# Patient Record
Sex: FEMALE | Race: WHITE | Employment: PART TIME | URBAN - METROPOLITAN AREA
[De-identification: names, ages, dates, MRNs, and addresses within clinical notes are randomized per-mention and may not be internally consistent; named-entity substitution may affect disease eponyms.]

---

## 2021-10-25 NOTE — PROGRESS NOTES
Spoke with Marybel Del Toro from Dr. Bob Valenzuela office and patient needs pre-admission testing done day of surgery

## 2021-10-26 RX ORDER — BUDESONIDE AND FORMOTEROL FUMARATE DIHYDRATE 160; 4.5 UG/1; UG/1
AEROSOL RESPIRATORY (INHALATION)
COMMUNITY
Start: 2020-12-22

## 2021-10-26 RX ORDER — ALBUTEROL SULFATE 90 UG/1
AEROSOL, METERED RESPIRATORY (INHALATION)
COMMUNITY
Start: 2021-02-01

## 2021-10-26 RX ORDER — TRAZODONE HYDROCHLORIDE 50 MG/1
50 TABLET ORAL NIGHTLY
COMMUNITY

## 2021-10-26 RX ORDER — LORATADINE 10 MG/1
10 TABLET ORAL DAILY
COMMUNITY

## 2021-10-26 NOTE — PROGRESS NOTES
C-Difficile admission screening and protocol:     * Admitted with diarrhea? n     *Prior history of C-Diff. In last 3 months?n     *Antibiotic use in the past 6-8 weeks?n     *Prior hospitalization or nursing home in the last month? n     [facility]. .call before you fall  [address] time____7       Surgery time___830__    Take the following medications with a sip of water:    Do not eat or drink anything after 12:00 midnight prior to your surgery. This includes water chewing gum, mints and ice chips. You may brush your teeth and gargle the morning of your surgery, but do not swallow the water     Please see your family doctor/pediatrician for a history and physical and/or concerning medications. Bring any test results/reports from your physicians office. If you are under the care of a heart doctor or specialist doctor, please be aware that you may be asked to them for clearance    You may be asked to stop blood thinners such as Coumadin, Plavix, Fragmin, Lovenox, etc., or any anti-inflammatories such as:  Aspirin, Ibuprofen, Advil, Naproxen prior to your surgery. We also ask that you stop any OTC medications such as fish oil, vitamin E, glucosamine, garlic, Multivitamins, COQ 10, etc.    We ask that you do not smoke 24 hours prior to surgery  We ask that you do not  drink any alcoholic beverages 24 hours prior to surgery     You must make arrangements for a responsible adult to take you home after your surgery. For your safety you will not be allowed to leave alone or drive yourself home. Your surgery will be cancelled if you do not have a ride home. Also for your safety, it is strongly suggested that someone stay with you the first 24 hours after your surgery. A parent or legal guardian must accompany a child scheduled for surgery and plan to stay at the hospital until the child is discharged.     Please do not bring other children with [phone]  Please note these are generalized instructions for all surgical cases, you may be provided with more specific instructions according to your surgery. Preoperative Screening for Elective Surgery/Invasive Procedures While COVID-19 present in the community     Have you tested positive or have been told to self-isolate for COVID-19 like symptoms within the past 28 days? n   Do you currently have any of the following symptoms? n  o Fever >100.0 F or 99.9 F in immunocompromised patients?n  o New onset cough, shortness of breath or difficulty breathing?n  o New onset sore throat, myalgia (muscle aches and pains), headache, loss of taste/smell or diarrhea?n   Have you had a potential exposure to COVID-19 within the past 14 days by:  o Close contact with a confirmed case?n  o Close contact with a healthcare worker,  or essential infrastructure worker (grocery store, [employer], gas station, public utilities or transportation)? y  o Do you reside in a congregate setting such as; skilled nursing facility, adult home, correctional facility, homeless shelter or other institutional setting?n  o Have you had recent travel to a known COVID-19 hotspot?n    Indicate if the patient has a positive screen by answering yes to one or more of the above questions. Patients who test positive or screen positive prior to surgery or on the day of surgery should be evaluated in conjunction with the surgeon/proceduralist/anesthesiologist to determine the urgency of the procedure. Pt was requested to have Rapid Covid test to be done prior to surgery/procedure.- PT WILL HAVE DOS UNDERSTANDS ALL THE BELOW NOTED  Understands that surgery/procedure maybe subjected to cancel if not completed prior to DOS and to bring copy of rapid testing in DOS. If positive, pt must contact surgeon immediately or with any other questions.

## 2021-10-27 ENCOUNTER — ANESTHESIA EVENT (OUTPATIENT)
Dept: OPERATING ROOM | Age: 31
End: 2021-10-27
Payer: MEDICAID

## 2021-10-28 ENCOUNTER — ANESTHESIA (OUTPATIENT)
Dept: OPERATING ROOM | Age: 31
End: 2021-10-28
Payer: MEDICAID

## 2021-10-28 ENCOUNTER — HOSPITAL ENCOUNTER (OUTPATIENT)
Age: 31
Setting detail: OUTPATIENT SURGERY
Discharge: HOME | End: 2021-10-28
Attending: OBSTETRICS & GYNECOLOGY | Admitting: OBSTETRICS & GYNECOLOGY
Payer: MEDICAID

## 2021-10-28 VITALS
RESPIRATION RATE: 10 BRPM | TEMPERATURE: 98.6 F | OXYGEN SATURATION: 99 % | DIASTOLIC BLOOD PRESSURE: 49 MMHG | SYSTOLIC BLOOD PRESSURE: 89 MMHG

## 2021-10-28 VITALS
HEART RATE: 66 BPM | WEIGHT: 227.74 LBS | OXYGEN SATURATION: 97 % | HEIGHT: 68 IN | RESPIRATION RATE: 15 BRPM | BODY MASS INDEX: 34.52 KG/M2 | SYSTOLIC BLOOD PRESSURE: 105 MMHG | TEMPERATURE: 97 F | DIASTOLIC BLOOD PRESSURE: 66 MMHG

## 2021-10-28 DIAGNOSIS — G89.18 POST-OP PAIN: Primary | ICD-10-CM

## 2021-10-28 PROBLEM — C53.1 MALIGNANT NEOPLASM OF EXOCERVIX (HCC): Status: ACTIVE | Noted: 2021-10-28

## 2021-10-28 LAB
ABO/RH: NORMAL
ANION GAP SERPL CALCULATED.3IONS-SCNC: 12 MMOL/L (ref 3–16)
ANTIBODY SCREEN: NORMAL
BACTERIA: ABNORMAL /HPF
BASOPHILS ABSOLUTE: 0 K/UL (ref 0–0.2)
BASOPHILS RELATIVE PERCENT: 0.6 %
BILIRUBIN URINE: NEGATIVE
BLOOD, URINE: NEGATIVE
BUN BLDV-MCNC: 9 MG/DL (ref 7–20)
CALCIUM SERPL-MCNC: 9.6 MG/DL (ref 8.3–10.6)
CHLORIDE BLD-SCNC: 102 MMOL/L (ref 99–110)
CLARITY: ABNORMAL
CO2: 24 MMOL/L (ref 21–32)
COLOR: ABNORMAL
COMMENT UA: ABNORMAL
CREAT SERPL-MCNC: 0.7 MG/DL (ref 0.6–1.1)
EOSINOPHILS ABSOLUTE: 0.3 K/UL (ref 0–0.6)
EOSINOPHILS RELATIVE PERCENT: 5.3 %
EPITHELIAL CELLS, UA: 14 /HPF (ref 0–5)
GFR AFRICAN AMERICAN: >60
GFR NON-AFRICAN AMERICAN: >60
GLUCOSE BLD-MCNC: 94 MG/DL (ref 70–99)
GLUCOSE URINE: NEGATIVE MG/DL
HCG(URINE) PREGNANCY TEST: NEGATIVE
HCT VFR BLD CALC: 40.8 % (ref 36–48)
HEMOGLOBIN: 14 G/DL (ref 12–16)
HYALINE CASTS: 6 /LPF (ref 0–8)
KETONES, URINE: ABNORMAL MG/DL
LEUKOCYTE ESTERASE, URINE: ABNORMAL
LYMPHOCYTES ABSOLUTE: 2.4 K/UL (ref 1–5.1)
LYMPHOCYTES RELATIVE PERCENT: 40.3 %
MCH RBC QN AUTO: 31.6 PG (ref 26–34)
MCHC RBC AUTO-ENTMCNC: 34.3 G/DL (ref 31–36)
MCV RBC AUTO: 92.1 FL (ref 80–100)
MICROSCOPIC EXAMINATION: YES
MONOCYTES ABSOLUTE: 0.4 K/UL (ref 0–1.3)
MONOCYTES RELATIVE PERCENT: 5.9 %
NEUTROPHILS ABSOLUTE: 2.8 K/UL (ref 1.7–7.7)
NEUTROPHILS RELATIVE PERCENT: 47.9 %
NITRITE, URINE: NEGATIVE
PDW BLD-RTO: 13.3 % (ref 12.4–15.4)
PH UA: 6 (ref 5–8)
PLATELET # BLD: 187 K/UL (ref 135–450)
PMV BLD AUTO: 10 FL (ref 5–10.5)
POTASSIUM SERPL-SCNC: 3.6 MMOL/L (ref 3.5–5.1)
PREGNANCY, URINE: NEGATIVE
PROTEIN UA: 30 MG/DL
RBC # BLD: 4.44 M/UL (ref 4–5.2)
RBC UA: 3 /HPF (ref 0–4)
SODIUM BLD-SCNC: 138 MMOL/L (ref 136–145)
SPECIFIC GRAVITY UA: 1.02 (ref 1–1.03)
URINE REFLEX TO CULTURE: YES
URINE TYPE: ABNORMAL
UROBILINOGEN, URINE: 0.2 E.U./DL
WBC # BLD: 5.9 K/UL (ref 4–11)
WBC UA: 259 /HPF (ref 0–5)

## 2021-10-28 PROCEDURE — 6370000000 HC RX 637 (ALT 250 FOR IP): Performed by: ANESTHESIOLOGY

## 2021-10-28 PROCEDURE — 7100000000 HC PACU RECOVERY - FIRST 15 MIN: Performed by: OBSTETRICS & GYNECOLOGY

## 2021-10-28 PROCEDURE — 3600000014 HC SURGERY LEVEL 4 ADDTL 15MIN: Performed by: OBSTETRICS & GYNECOLOGY

## 2021-10-28 PROCEDURE — 6360000002 HC RX W HCPCS: Performed by: OBSTETRICS & GYNECOLOGY

## 2021-10-28 PROCEDURE — 87086 URINE CULTURE/COLONY COUNT: CPT

## 2021-10-28 PROCEDURE — 86901 BLOOD TYPING SEROLOGIC RH(D): CPT

## 2021-10-28 PROCEDURE — 6360000002 HC RX W HCPCS: Performed by: NURSE ANESTHETIST, CERTIFIED REGISTERED

## 2021-10-28 PROCEDURE — 6360000002 HC RX W HCPCS: Performed by: ANESTHESIOLOGY

## 2021-10-28 PROCEDURE — 3600000004 HC SURGERY LEVEL 4 BASE: Performed by: OBSTETRICS & GYNECOLOGY

## 2021-10-28 PROCEDURE — 84703 CHORIONIC GONADOTROPIN ASSAY: CPT

## 2021-10-28 PROCEDURE — 88305 TISSUE EXAM BY PATHOLOGIST: CPT

## 2021-10-28 PROCEDURE — 87077 CULTURE AEROBIC IDENTIFY: CPT

## 2021-10-28 PROCEDURE — 6370000000 HC RX 637 (ALT 250 FOR IP): Performed by: OBSTETRICS & GYNECOLOGY

## 2021-10-28 PROCEDURE — 2580000003 HC RX 258: Performed by: OBSTETRICS & GYNECOLOGY

## 2021-10-28 PROCEDURE — 7100000011 HC PHASE II RECOVERY - ADDTL 15 MIN: Performed by: OBSTETRICS & GYNECOLOGY

## 2021-10-28 PROCEDURE — 7100000001 HC PACU RECOVERY - ADDTL 15 MIN: Performed by: OBSTETRICS & GYNECOLOGY

## 2021-10-28 PROCEDURE — 3700000001 HC ADD 15 MINUTES (ANESTHESIA): Performed by: OBSTETRICS & GYNECOLOGY

## 2021-10-28 PROCEDURE — 86850 RBC ANTIBODY SCREEN: CPT

## 2021-10-28 PROCEDURE — 3700000000 HC ANESTHESIA ATTENDED CARE: Performed by: OBSTETRICS & GYNECOLOGY

## 2021-10-28 PROCEDURE — 85025 COMPLETE CBC W/AUTO DIFF WBC: CPT

## 2021-10-28 PROCEDURE — 2709999900 HC NON-CHARGEABLE SUPPLY: Performed by: OBSTETRICS & GYNECOLOGY

## 2021-10-28 PROCEDURE — 2500000003 HC RX 250 WO HCPCS: Performed by: OBSTETRICS & GYNECOLOGY

## 2021-10-28 PROCEDURE — 86900 BLOOD TYPING SEROLOGIC ABO: CPT

## 2021-10-28 PROCEDURE — 81001 URINALYSIS AUTO W/SCOPE: CPT

## 2021-10-28 PROCEDURE — 2500000003 HC RX 250 WO HCPCS: Performed by: NURSE ANESTHETIST, CERTIFIED REGISTERED

## 2021-10-28 PROCEDURE — 7100000010 HC PHASE II RECOVERY - FIRST 15 MIN: Performed by: OBSTETRICS & GYNECOLOGY

## 2021-10-28 PROCEDURE — 88331 PATH CONSLTJ SURG 1 BLK 1SPC: CPT

## 2021-10-28 PROCEDURE — 2580000003 HC RX 258: Performed by: ANESTHESIOLOGY

## 2021-10-28 PROCEDURE — 80048 BASIC METABOLIC PNL TOTAL CA: CPT

## 2021-10-28 RX ORDER — FERRIC SUBSULFATE 0.21 G/G
LIQUID TOPICAL
Status: COMPLETED | OUTPATIENT
Start: 2021-10-28 | End: 2021-10-28

## 2021-10-28 RX ORDER — DOXYCYCLINE HYCLATE 100 MG
100 TABLET ORAL 2 TIMES DAILY
Qty: 14 TABLET | Refills: 0 | Status: SHIPPED | OUTPATIENT
Start: 2021-10-28 | End: 2021-11-04

## 2021-10-28 RX ORDER — ONDANSETRON 2 MG/ML
4 INJECTION INTRAMUSCULAR; INTRAVENOUS
Status: DISCONTINUED | OUTPATIENT
Start: 2021-10-28 | End: 2021-10-28 | Stop reason: HOSPADM

## 2021-10-28 RX ORDER — ACETAMINOPHEN 500 MG
500 TABLET ORAL EVERY 6 HOURS PRN
Qty: 30 TABLET | Refills: 0 | Status: ON HOLD | OUTPATIENT
Start: 2021-10-28 | End: 2022-03-18 | Stop reason: SDUPTHER

## 2021-10-28 RX ORDER — OXYCODONE HYDROCHLORIDE 10 MG/1
10 TABLET ORAL PRN
Status: COMPLETED | OUTPATIENT
Start: 2021-10-28 | End: 2021-10-28

## 2021-10-28 RX ORDER — SODIUM CHLORIDE 0.9 % (FLUSH) 0.9 %
10 SYRINGE (ML) INJECTION EVERY 12 HOURS SCHEDULED
Status: DISCONTINUED | OUTPATIENT
Start: 2021-10-28 | End: 2021-10-28 | Stop reason: HOSPADM

## 2021-10-28 RX ORDER — SODIUM CHLORIDE 9 MG/ML
25 INJECTION, SOLUTION INTRAVENOUS PRN
Status: DISCONTINUED | OUTPATIENT
Start: 2021-10-28 | End: 2021-10-28 | Stop reason: HOSPADM

## 2021-10-28 RX ORDER — GLYCOPYRROLATE 0.2 MG/ML
INJECTION INTRAMUSCULAR; INTRAVENOUS PRN
Status: DISCONTINUED | OUTPATIENT
Start: 2021-10-28 | End: 2021-10-28 | Stop reason: SDUPTHER

## 2021-10-28 RX ORDER — FENTANYL CITRATE 50 UG/ML
25 INJECTION, SOLUTION INTRAMUSCULAR; INTRAVENOUS EVERY 5 MIN PRN
Status: DISCONTINUED | OUTPATIENT
Start: 2021-10-28 | End: 2021-10-28 | Stop reason: HOSPADM

## 2021-10-28 RX ORDER — EPHEDRINE SULFATE/0.9% NACL/PF 50 MG/5 ML
SYRINGE (ML) INTRAVENOUS PRN
Status: DISCONTINUED | OUTPATIENT
Start: 2021-10-28 | End: 2021-10-28 | Stop reason: SDUPTHER

## 2021-10-28 RX ORDER — IBUPROFEN 600 MG/1
600 TABLET ORAL EVERY 6 HOURS
Qty: 30 TABLET | Refills: 0 | Status: SHIPPED | OUTPATIENT
Start: 2021-10-28 | End: 2022-03-08

## 2021-10-28 RX ORDER — MORPHINE SULFATE 2 MG/ML
2 INJECTION, SOLUTION INTRAMUSCULAR; INTRAVENOUS EVERY 5 MIN PRN
Status: DISCONTINUED | OUTPATIENT
Start: 2021-10-28 | End: 2021-10-28 | Stop reason: HOSPADM

## 2021-10-28 RX ORDER — SODIUM CHLORIDE 9 MG/ML
INJECTION, SOLUTION INTRAVENOUS CONTINUOUS
Status: DISCONTINUED | OUTPATIENT
Start: 2021-10-28 | End: 2021-10-28 | Stop reason: HOSPADM

## 2021-10-28 RX ORDER — OXYCODONE HYDROCHLORIDE 5 MG/1
5 TABLET ORAL PRN
Status: COMPLETED | OUTPATIENT
Start: 2021-10-28 | End: 2021-10-28

## 2021-10-28 RX ORDER — LIDOCAINE HYDROCHLORIDE 20 MG/ML
INJECTION, SOLUTION EPIDURAL; INFILTRATION; INTRACAUDAL; PERINEURAL PRN
Status: DISCONTINUED | OUTPATIENT
Start: 2021-10-28 | End: 2021-10-28 | Stop reason: SDUPTHER

## 2021-10-28 RX ORDER — BUPIVACAINE HYDROCHLORIDE 2.5 MG/ML
INJECTION, SOLUTION EPIDURAL; INFILTRATION; INTRACAUDAL
Status: COMPLETED | OUTPATIENT
Start: 2021-10-28 | End: 2021-10-28

## 2021-10-28 RX ORDER — DEXAMETHASONE SODIUM PHOSPHATE 4 MG/ML
INJECTION, SOLUTION INTRA-ARTICULAR; INTRALESIONAL; INTRAMUSCULAR; INTRAVENOUS; SOFT TISSUE PRN
Status: DISCONTINUED | OUTPATIENT
Start: 2021-10-28 | End: 2021-10-28 | Stop reason: SDUPTHER

## 2021-10-28 RX ORDER — PROPOFOL 10 MG/ML
INJECTION, EMULSION INTRAVENOUS PRN
Status: DISCONTINUED | OUTPATIENT
Start: 2021-10-28 | End: 2021-10-28 | Stop reason: SDUPTHER

## 2021-10-28 RX ORDER — MORPHINE SULFATE 2 MG/ML
1 INJECTION, SOLUTION INTRAMUSCULAR; INTRAVENOUS EVERY 5 MIN PRN
Status: DISCONTINUED | OUTPATIENT
Start: 2021-10-28 | End: 2021-10-28 | Stop reason: HOSPADM

## 2021-10-28 RX ORDER — FENTANYL CITRATE 50 UG/ML
50 INJECTION, SOLUTION INTRAMUSCULAR; INTRAVENOUS EVERY 5 MIN PRN
Status: DISCONTINUED | OUTPATIENT
Start: 2021-10-28 | End: 2021-10-28 | Stop reason: HOSPADM

## 2021-10-28 RX ORDER — SODIUM CHLORIDE 0.9 % (FLUSH) 0.9 %
10 SYRINGE (ML) INJECTION PRN
Status: DISCONTINUED | OUTPATIENT
Start: 2021-10-28 | End: 2021-10-28 | Stop reason: HOSPADM

## 2021-10-28 RX ORDER — FENTANYL CITRATE 50 UG/ML
INJECTION, SOLUTION INTRAMUSCULAR; INTRAVENOUS PRN
Status: DISCONTINUED | OUTPATIENT
Start: 2021-10-28 | End: 2021-10-28 | Stop reason: SDUPTHER

## 2021-10-28 RX ORDER — MEPERIDINE HYDROCHLORIDE 25 MG/ML
12.5 INJECTION INTRAMUSCULAR; INTRAVENOUS; SUBCUTANEOUS EVERY 5 MIN PRN
Status: DISCONTINUED | OUTPATIENT
Start: 2021-10-28 | End: 2021-10-28 | Stop reason: HOSPADM

## 2021-10-28 RX ORDER — ONDANSETRON 2 MG/ML
INJECTION INTRAMUSCULAR; INTRAVENOUS PRN
Status: DISCONTINUED | OUTPATIENT
Start: 2021-10-28 | End: 2021-10-28 | Stop reason: SDUPTHER

## 2021-10-28 RX ORDER — TRAMADOL HYDROCHLORIDE 50 MG/1
50 TABLET ORAL EVERY 6 HOURS PRN
Qty: 12 TABLET | Refills: 0 | Status: SHIPPED | OUTPATIENT
Start: 2021-10-28 | End: 2021-10-31

## 2021-10-28 RX ORDER — MIDAZOLAM HYDROCHLORIDE 1 MG/ML
INJECTION INTRAMUSCULAR; INTRAVENOUS PRN
Status: DISCONTINUED | OUTPATIENT
Start: 2021-10-28 | End: 2021-10-28 | Stop reason: SDUPTHER

## 2021-10-28 RX ADMIN — FENTANYL CITRATE 50 MCG: 50 INJECTION, SOLUTION INTRAMUSCULAR; INTRAVENOUS at 09:46

## 2021-10-28 RX ADMIN — OXYCODONE HYDROCHLORIDE 10 MG: 10 TABLET ORAL at 10:23

## 2021-10-28 RX ADMIN — CEFOXITIN SODIUM 2000 MG: 2 POWDER, FOR SOLUTION INTRAVENOUS at 08:29

## 2021-10-28 RX ADMIN — GLYCOPYRROLATE 0.2 MG: 0.2 INJECTION, SOLUTION INTRAMUSCULAR; INTRAVENOUS at 08:40

## 2021-10-28 RX ADMIN — Medication 10 MG: at 08:54

## 2021-10-28 RX ADMIN — LIDOCAINE HYDROCHLORIDE 80 MG: 20 INJECTION, SOLUTION EPIDURAL; INFILTRATION; INTRACAUDAL; PERINEURAL at 08:32

## 2021-10-28 RX ADMIN — ONDANSETRON 4 MG: 2 INJECTION INTRAMUSCULAR; INTRAVENOUS at 08:36

## 2021-10-28 RX ADMIN — FENTANYL CITRATE 50 MCG: 50 INJECTION, SOLUTION INTRAMUSCULAR; INTRAVENOUS at 09:38

## 2021-10-28 RX ADMIN — DEXAMETHASONE SODIUM PHOSPHATE 8 MG: 4 INJECTION, SOLUTION INTRAMUSCULAR; INTRAVENOUS at 08:36

## 2021-10-28 RX ADMIN — PROPOFOL 250 MG: 10 INJECTION, EMULSION INTRAVENOUS at 08:32

## 2021-10-28 RX ADMIN — FENTANYL CITRATE 25 MCG: 50 INJECTION INTRAMUSCULAR; INTRAVENOUS at 08:42

## 2021-10-28 RX ADMIN — FENTANYL CITRATE 25 MCG: 50 INJECTION INTRAMUSCULAR; INTRAVENOUS at 08:50

## 2021-10-28 RX ADMIN — SODIUM CHLORIDE: 9 INJECTION, SOLUTION INTRAVENOUS at 08:27

## 2021-10-28 RX ADMIN — MIDAZOLAM 2 MG: 1 INJECTION INTRAMUSCULAR; INTRAVENOUS at 08:29

## 2021-10-28 RX ADMIN — Medication 10 MG: at 08:51

## 2021-10-28 RX ADMIN — Medication 10 MG: at 08:43

## 2021-10-28 RX ADMIN — FENTANYL CITRATE 50 MCG: 50 INJECTION INTRAMUSCULAR; INTRAVENOUS at 08:32

## 2021-10-28 ASSESSMENT — PULMONARY FUNCTION TESTS
PIF_VALUE: 12
PIF_VALUE: 12
PIF_VALUE: 13
PIF_VALUE: 12
PIF_VALUE: 13
PIF_VALUE: 1
PIF_VALUE: 12
PIF_VALUE: 13
PIF_VALUE: 0
PIF_VALUE: 12
PIF_VALUE: 13
PIF_VALUE: 1
PIF_VALUE: 10
PIF_VALUE: 13
PIF_VALUE: 1
PIF_VALUE: 13
PIF_VALUE: 12
PIF_VALUE: 13
PIF_VALUE: 12
PIF_VALUE: 13
PIF_VALUE: 12
PIF_VALUE: 13
PIF_VALUE: 12
PIF_VALUE: 13
PIF_VALUE: 12
PIF_VALUE: 13
PIF_VALUE: 0
PIF_VALUE: 13
PIF_VALUE: 13
PIF_VALUE: 12
PIF_VALUE: 13
PIF_VALUE: 12

## 2021-10-28 ASSESSMENT — PAIN - FUNCTIONAL ASSESSMENT
PAIN_FUNCTIONAL_ASSESSMENT: ACTIVITIES ARE NOT PREVENTED
PAIN_FUNCTIONAL_ASSESSMENT: 0-10

## 2021-10-28 ASSESSMENT — ENCOUNTER SYMPTOMS: SHORTNESS OF BREATH: 0

## 2021-10-28 ASSESSMENT — PAIN DESCRIPTION - PROGRESSION
CLINICAL_PROGRESSION: NOT CHANGED
CLINICAL_PROGRESSION: RAPIDLY IMPROVING
CLINICAL_PROGRESSION: GRADUALLY WORSENING

## 2021-10-28 ASSESSMENT — PAIN DESCRIPTION - LOCATION
LOCATION: ABDOMEN;VAGINA

## 2021-10-28 ASSESSMENT — PAIN DESCRIPTION - PAIN TYPE
TYPE: SURGICAL PAIN

## 2021-10-28 ASSESSMENT — PAIN DESCRIPTION - FREQUENCY
FREQUENCY: CONTINUOUS

## 2021-10-28 ASSESSMENT — PAIN SCALES - GENERAL
PAINLEVEL_OUTOF10: 7
PAINLEVEL_OUTOF10: 0
PAINLEVEL_OUTOF10: 7
PAINLEVEL_OUTOF10: 3
PAINLEVEL_OUTOF10: 7

## 2021-10-28 ASSESSMENT — PAIN DESCRIPTION - DESCRIPTORS
DESCRIPTORS: BURNING;CRAMPING
DESCRIPTORS: PRESSURE
DESCRIPTORS: DISCOMFORT;PRESSURE
DESCRIPTORS: BURNING;CRAMPING

## 2021-10-28 ASSESSMENT — PAIN DESCRIPTION - ONSET
ONSET: ON-GOING
ONSET: SUDDEN

## 2021-10-28 ASSESSMENT — LIFESTYLE VARIABLES: SMOKING_STATUS: 0

## 2021-10-28 NOTE — PROGRESS NOTES
Discharge instructions reviewed with pt and mom. Verbalized understanding. VSS. Pt states pain is improving. Up getting dressed and ready for discharge.

## 2021-10-28 NOTE — H&P
Date of Surgery Update:  Mike Ramos was seen, history and physical examination reviewed, and patient examined by me today. There have been no significant clinical changes since the completion of the previous history and physical.    The risk, benefits, and alternatives of the proposed procedure have been explained to the patient (or appropriate guardian) and understanding verbalized. All questions answered. Patient wishes to proceed.     Electronically signed by: Mague Skinner MD,10/28/2021,8:24 AM

## 2021-10-28 NOTE — PROGRESS NOTES
Vaginal Sweep Documentation     Vaginal prep sponge count performed by NICKY Daily RN and Randall Casey. Count correct. Vaginal sweep performed by Dr. Seven Leo  at 1045. No foreign objects or vaginal tears noted.

## 2021-10-28 NOTE — ANESTHESIA POSTPROCEDURE EVALUATION
Department of Anesthesiology  Postprocedure Note    Patient: Byron Mcintyre  MRN: [id number]  [place]: 1990  Date of evaluation: 10/28/2021  Time:  11:14 AM     Procedure Summary     Date: 10/28/21 Room / Location: [address]    Anesthesia Start: 3406 Anesthesia Stop: 0912    Procedures:       EXAM UNDER ANESTHESIA (N/A )      CERVICAL BIOPSIES,  LOOP EXCISIONAL ELECTROCAUTERY PROCEDURE (N/A ) Diagnosis: (CARCINOMA IN SITU, CERVICAL DYSPLASIA)    Surgeons: Jayant Castellano MD Responsible Provider: Lizbeth Escamilla MD    Anesthesia Type: general ASA Status: 2          Anesthesia Type: general    Keerthi Phase I: Keerthi Score: 10    Keerthi Phase II: Keerthi Score: 10    Last vitals: Reviewed and per EMR flowsheets.        Anesthesia Post Evaluation    Patient location during evaluation: PACU  Patient participation: complete - patient participated  Level of consciousness: awake and alert  Pain score: 0  Airway patency: patent  Nausea & Vomiting: no nausea and no vomiting  Complications: no  Cardiovascular status: blood pressure returned to baseline  Respiratory status: acceptable  Hydration status: euvolemic

## 2021-10-28 NOTE — ANESTHESIA PRE PROCEDURE
Department of Anesthesiology  Preprocedure Note       Name:  Carmen Garcia   Age:  36 y.o.  :  1990                                          MRN:  [id number]         Date:  10/28/2021      Surgeon: Pili Camp):  Eh Armenta MD    Procedure: Procedure(s):  EXAM UNDER ANESTHESIA  CERVICAL BIOPSIES, POSSIBLE LOOP EXCISIONAL ELECTROCAUTERY PROCEDURE    Medications prior to admission:   Prior to Admission medications    Medication Sig Start Date End Date Taking? Authorizing Provider   budesonide-formoterol (SYMBICORT) 160-4.5 MCG/ACT AERO INHALE 2 PUFFS BY MOUTH INTO THE LUNGS TWO TIMES A DAY 20  Yes Historical Provider, MD   albuterol sulfate  (90 Base) MCG/ACT inhaler INHALE 2 PUFFS INTO THE LUNGS EVERY 4 TO 6 HOURS AS NEEDED 21  Yes Historical Provider, MD   loratadine (CLARITIN) 10 MG tablet Take 10 mg by mouth daily   Yes Historical Provider, MD   traZODone (DESYREL) 50 MG tablet Take 50 mg by mouth nightly   Yes Historical Provider, MD   albuterol (PROVENTIL) (2.5 MG/3ML) 0.083% nebulizer solution Take 2.5 mg by nebulization every 6 hours as needed for Wheezing   Yes Historical Provider, MD   omeprazole (PRILOSEC) 10 MG capsule Take 10 mg by mouth daily   Yes Historical Provider, MD   mometasone (NASONEX) 50 MCG/ACT nasal spray 2 sprays by Nasal route daily   Yes Historical Provider, MD       Current medications:    No current facility-administered medications for this encounter. Allergies: Allergies   Allergen Reactions    Other      History of bariatric surgery       Problem List:  There is no problem list on file for this patient.       Past Medical History:        Diagnosis Date    ADHD (attention deficit hyperactivity disorder)     Asthma     Bipolar 1 disorder ([facility] [address]     Bipolar disorder ([facility] [address]     COVID-19     Depression     Irregular periods        Past Surgical History:        Procedure Laterality Date    STOMACH SURGERY      WISDOM TOOTH EXTRACTION         Social History:    Social History     Tobacco Use    Smoking status: Never Smoker    Smokeless tobacco: Never Used   Substance Use Topics    Alcohol use: Yes     Comment: social                                Counseling given: Not Answered      Vital Signs (Current):   Vitals:    10/26/21 1147   Weight: 228 lb (103.4 kg)   Height: 5' 8\" (1.727 m)                                              BP Readings from Last 3 Encounters:   01/15/16 118/77       NPO Status:                                                                                 BMI:   Wt Readings from Last 3 Encounters:   10/26/21 228 lb (103.4 kg)   01/15/16 200 lb (90.7 kg)     Body mass index is 34.67 kg/m². CBC:   Lab Results   Component Value Date    WBC 18.8 01/15/2016    RBC 4.96 01/15/2016    HGB 15.3 01/15/2016    HCT 45.5 01/15/2016    MCV 91.8 01/15/2016    RDW 12.8 01/15/2016     01/15/2016       CMP:   Lab Results   Component Value Date     01/15/2016    K 3.9 01/15/2016     01/15/2016    CO2 22 01/15/2016    BUN 9 01/15/2016    CREATININE 0.7 01/15/2016    GFRAA >60 01/15/2016    LABGLOM >60 01/15/2016    GLUCOSE 131 01/15/2016    CALCIUM 9.8 01/15/2016       POC Tests: No results for input(s): POCGLU, POCNA, POCK, POCCL, POCBUN, POCHEMO, POCHCT in the last 72 hours.     Coags: No results found for: PROTIME, INR, APTT    HCG (If Applicable): No results found for: PREGTESTUR, PREGSERUM, HCG, HCGQUANT     ABGs: No results found for: PHART, PO2ART, XBM9CYN, ENO8TNL, BEART, D2PJLIBN     Type & Screen (If Applicable):  No results found for: LABABO, LABRH    Drug/Infectious Status (If Applicable):  Lab Results   Component Value Date    HEPCAB Non-Reactive (Negative) 12/27/2011       COVID-19 Screening (If Applicable): No results found for: COVID19        Anesthesia Evaluation  Patient summary reviewed and Nursing notes reviewed no history of anesthetic complications:   Airway: Mallampati: II  TM distance: >3 FB   Neck ROM: full  Mouth opening: > = 3 FB Dental: normal exam         Pulmonary:   (+) asthma:     (-) pneumonia, COPD, shortness of breath, recent URI, sleep apnea and not a current smoker                           Cardiovascular:        (-) pacemaker, hypertension, valvular problems/murmurs, past MI, CAD, CABG/stent, dysrhythmias,  angina,  CHF, orthopnea, PND,  DUMONT, no pulmonary hypertension and no hyperlipidemia      Rhythm: regular                      Neuro/Psych:   (+) psychiatric history:   (-) seizures, neuromuscular disease, TIA, CVA, headaches and depression/anxiety            GI/Hepatic/Renal:   (+) GERD:,      (-) hiatal hernia, PUD, hepatitis, liver disease, no renal disease, bowel prep and no morbid obesity       Endo/Other:    (+) no malignancy/cancer. (-) diabetes mellitus, hypothyroidism, hyperthyroidism, blood dyscrasia, arthritis, no electrolyte abnormalities, no malignancy/cancer               Abdominal:             Vascular:     - PVD, DVT and PE. Other Findings:             Anesthesia Plan      general     ASA 2       Induction: intravenous. MIPS: Postoperative opioids intended, Prophylactic antiemetics administered and Postoperative trial extubation. Anesthetic plan and risks discussed with patient. Plan discussed with CRNA. Allyn Vernon MD   10/28/2021        This pre-anesthesia assessment may be used as a history and physical.    DOS STAFF ADDENDUM:    Pt seen and examined, chart reviewed (including anesthesia, drug and allergy history). No interval changes to history and physical examination. Anesthetic plan, risks, benefits, alternatives, and personnel involved discussed with patient. Patient verbalized an understanding and agrees to proceed.       Allyn Vernon MD  October 28, 2021  6:58 AM

## 2021-10-28 NOTE — OP NOTE
encompassing the entire ectocervix, no parametrial involvement, no vaginal involvement. 0.25% Marcaine was used to infiltrate the nicolette of the cervix. Using a loop electrocautery device an anterior and posterior portion of the cervix was excised for further evaluation and an additional biopsy was taken for frozen section. The bed was fulgurated. Irrigation was performed. Monsel's Solution was applied with two pieces of Surgicel. Hemostasis was noted. All instruments removed. Counts correct x 2.    Pt extubated and taken to the PACU in stable condition      Electronically signed by Sandra Auguste MD on 10/28/2021 at 10:28 AM

## 2021-10-28 NOTE — PROGRESS NOTES
Pt arrived to Phase 2 from the PACU alert and oriented. Pt states pain is 7/10. Pain meds given. VSS. Dior pad with scant drainage. Mom at bedside. Will continue to monitor.

## 2021-10-31 LAB
ORGANISM: ABNORMAL
URINE CULTURE, ROUTINE: ABNORMAL
URINE CULTURE, ROUTINE: ABNORMAL

## 2021-11-26 ENCOUNTER — HOSPITAL ENCOUNTER (OUTPATIENT)
Dept: MRI IMAGING | Age: 31
Discharge: HOME | End: 2021-11-26
Payer: MEDICAID

## 2021-11-26 DIAGNOSIS — C53.1 MALIGNANT NEOPLASM OF EXOCERVIX (HCC): ICD-10-CM

## 2021-11-26 PROCEDURE — A9579 GAD-BASE MR CONTRAST NOS,1ML: HCPCS | Performed by: NURSE PRACTITIONER

## 2021-11-26 PROCEDURE — 6360000004 HC RX CONTRAST MEDICATION: Performed by: NURSE PRACTITIONER

## 2021-11-26 PROCEDURE — 72197 MRI PELVIS W/O & W/DYE: CPT

## 2021-11-26 RX ADMIN — GADOTERIDOL 20 ML: 279.3 INJECTION, SOLUTION INTRAVENOUS at 20:24

## 2022-01-20 ENCOUNTER — OFFICE VISIT (OUTPATIENT)
Dept: SURGERY | Age: 32
End: 2022-01-20
Payer: COMMERCIAL

## 2022-01-20 VITALS
SYSTOLIC BLOOD PRESSURE: 126 MMHG | HEIGHT: 67 IN | HEART RATE: 72 BPM | WEIGHT: 207.8 LBS | OXYGEN SATURATION: 98 % | DIASTOLIC BLOOD PRESSURE: 82 MMHG | BODY MASS INDEX: 32.62 KG/M2

## 2022-01-20 DIAGNOSIS — N62 MACROMASTIA: ICD-10-CM

## 2022-01-20 DIAGNOSIS — C53.1 MALIGNANT NEOPLASM OF EXOCERVIX (HCC): ICD-10-CM

## 2022-01-20 DIAGNOSIS — M79.3 PANNICULITIS: Primary | ICD-10-CM

## 2022-01-20 PROCEDURE — G8427 DOCREV CUR MEDS BY ELIG CLIN: HCPCS | Performed by: SURGERY

## 2022-01-20 PROCEDURE — 99204 OFFICE O/P NEW MOD 45 MIN: CPT | Performed by: SURGERY

## 2022-01-20 PROCEDURE — 1036F TOBACCO NON-USER: CPT | Performed by: SURGERY

## 2022-01-20 PROCEDURE — G8484 FLU IMMUNIZE NO ADMIN: HCPCS | Performed by: SURGERY

## 2022-01-20 PROCEDURE — G8417 CALC BMI ABV UP PARAM F/U: HCPCS | Performed by: SURGERY

## 2022-01-20 NOTE — PROGRESS NOTES
Not on file    Years of education: Not on file    Highest education level: Not on file   Occupational History    Not on file   Tobacco Use    Smoking status: Never Smoker    Smokeless tobacco: Never Used   Vaping Use    Vaping Use: Never used   Substance and Sexual Activity    Alcohol use: Yes     Comment: social    Drug use: Yes     Types: Marijuana (Weed)     Comment: occasionally- last smoked 10-16- told to not smoke re: procedure    Sexual activity: Yes   Other Topics Concern    Not on file   Social History Narrative    Not on file     Social Determinants of Health     Financial Resource Strain:     Difficulty of Paying Living Expenses: Not on file   Food Insecurity:     Worried About Running Out of Food in the Last Year: Not on file    Mary of Food in the Last Year: Not on file   Transportation Needs:     Lack of Transportation (Medical): Not on file    Lack of Transportation (Non-Medical):  Not on file   Physical Activity:     Days of Exercise per Week: Not on file    Minutes of Exercise per Session: Not on file   Stress:     Feeling of Stress : Not on file   Social Connections:     Frequency of Communication with Friends and Family: Not on file    Frequency of Social Gatherings with Friends and Family: Not on file    Attends Mosque Services: Not on file    Active Member of [address] [place] [address] [employer] or Organizations: Not on file    Attends Club or Organization Meetings: Not on file    Marital Status: Not on file   Intimate Partner Violence:     Fear of Current or Ex-Partner: Not on file    Emotionally Abused: Not on file    Physically Abused: Not on file    Sexually Abused: Not on file   Housing Stability:     Unable to Pay for Housing in the Last Year: Not on file    Number of [place] in the Last Year: Not on file    Unstable Housing in the Last Year: Not on file     Family History   Problem Relation Age of Onset    Cancer Mother         skin    High Blood Pressure Father Allergy:   Allergies   Allergen Reactions    Other      History of bariatric surgery       ROS History obtained from the patient  General ROS: negative  Psychological ROS: negative  Ophthalmic ROS: negative  Neurological ROS: negative  ENT ROS: negative  Allergy and Immunology ROS: negative  Hematological and Lymphatic ROS: negative  Endocrine ROS: negative  Respiratory ROS: negative  Cardiovascular ROS: negative  Gastrointestinal ROS: See HPI  Genito-Urinary ROS: negative  Musculoskeletal ROS: negative   Skin ROS: See HPI. EXAM    /82   Pulse 72   Ht 5' 7\" (1.702 m)   Wt 207 lb 12.8 oz (94.3 kg)   SpO2 98%   BMI 32.55 kg/m²     GEN: NAD, pleasant, obese  CVS: RRR  PULM: No respiratory distress  HEENT: PERRLA/EOMI; hearing appears within normal limits  NECK: Supple with trachea in midline, no masses  BREAST: Right larger than Left   R  Ptosis thgthrthathdtheth:th th4th Palpable masses: No     Nipple retraction: No     Palpable axillary lymphadenopathy: No     SN-N: 41 cm     N-IMF: 16.8 cm     Breast width: 17.3 cm       L  Ptosis thgthrthathdtheth:th th4th Palpable masses: No     Nipple retraction: No     Palpable axillary lymphadenopathy: No     SN-N: 40.5 cm     N-IMF: 17 cm     Breast width: 18.1 cm  ABD: soft/NT/obese  No palpable hernia, but exam limited secondary to habitus  EXT: No lymphedema noted  NEURO: No focal deficits, no obvious CN deficits    Estimated Reduction Volume (Schnur scale): 620 grams (each)    IMP: 36 y.o. female with cervical cancer and macromastia  PLAN: Would benefit from panniculectomy for assistance with exposure. However, ideally would wait until the patient has reached weight stability prior to intervention. Will discuss timing with Dr. Fabi Pete and then determine best option(s) for breast reduction, panniculectomy, as well as potential abdominoplasty. Will see again after discussion with gyn/onc and submit to insurance and then schedule.     The complexity of body contouring procedures and wound healing physiology were discussed with the patient. She was counseled on ideal medical optimization (nutrition, weight stability, etc.). We also discussed the pros & cons of staging for multiple procedures including controlling tension from various sites and postoperative care managment. Clinical photos were obtained. The risks, benefits, alternatives, outcomes, personnel involved were discussed and all questions were answered in a satisfactory manner according to the patient. Specifically,the risks including, but not limited to: bleeding possibly requiring transfusion orreoperation, infection, seroma, nonhealing of wounds, poor cosmetic outcome, nipple loss, scarring, VTE (DVT/PE), and death were discussed. The patient was counseled at length about the risks of dana Covid-19 during their perioperative period and any recovery window from their procedure. The patient was made aware that dana Covid-19  may worsen their prognosis for recovering from their procedure  and lend to a higher morbidity and/or mortality risk. All material risks, benefits, and reasonable alternatives including postponing the procedure were discussed. The patient does wish to proceed with the procedure at this time.     Brittani Calvert MD  [address] [place] [address] [facility]  [phone]  01/20/22

## 2022-01-20 NOTE — Clinical Note
What is the latest time you can move this hysterectomy? Also, do you think she will need radiation? Thanks!  Loida Soto

## 2022-03-08 NOTE — PROGRESS NOTES
Name_______________________________________Printed:____________________  Date and time of surgery___3/14/2022____0830_________________Arrival Time:__0630  main______________   1. The instructions given regarding when and if a patient needs to stop oral intake prior to surgery varies. Follow the specific instructions you were given                  __x_Nothing to eat or to drink after Midnight the night before.                   ____Carbo loading or ERAS instructions will be given to select patients-if you have been given those instructions -please do the following                           The evening before your surgery after dinner before midnight drink 40 ounces of gatorade. If you are diabetic use sugar free. The morning of surgery drink 40 ounces of water. This needs to be finished 3 hours prior to your surgery start time. 2. Take the following pills with a small sip of water on the morning of surgery_omeprazole, symbicort, inhalers__________________________________________________                  Do not take blood pressure medications ending in pril or sartan the vero prior to surgery or the morning of surgery_   3. Aspirin, Ibuprofen, Advil, Naproxen, Vitamin E and other Anti-inflammatory products and supplements should be stopped for 5 -7days before surgery or as directed by your physician. 4. Check with your Doctor regarding stopping Plavix, Coumadin,Eliquis, Lovenox,Effient,Pradaxa,Xarelto, Fragmin or other blood thinners and follow their instructions. 5. Do not smoke, and do not drink any alcoholic beverages 24 hours prior to surgery. This includes NA Beer. Refrain from the usage of any recreational drugs. 6. You may brush your teeth and gargle the morning of surgery. DO NOT SWALLOW WATER   7. You MUST make arrangements for a responsible adult to stay on site while you are here and take you home after your surgery. You will not be allowed to leave alone or drive yourself home.   It is strongly suggested someone stay with you the first 24 hrs. Your surgery will be cancelled if you do not have a ride home. 8. A parent/legal guardian must accompany a child scheduled for surgery and plan to stay at the hospital until the child is discharged. Please do not bring other children with you. 9. Please wear simple, loose fitting clothing to the hospital.  Shanti Hortoning not bring valuables (money, credit cards, checkbooks, etc.) Do not wear any makeup (including no eye makeup) or nail polish on your fingers or toes. 10. DO NOT wear any jewelry or piercings on day of surgery. All body piercing jewelry must be removed. 11. If you have ___dentures, they will be removed before going to the OR; we will provide you a container. If you wear ___contact lenses or ___glasses, they will be removed; please bring a case for them. 12. Please see your family doctor/pediatrician for a history & physical and/or concerning medications. Bring any test results/reports from your physician's office. PCP__________________Phone___________H&P Appt. Date________             13 If you  have a Living Will and Durable Power of  for Healthcare, please bring in a copy. 15. Notify your Surgeon if you develop any illness between now and surgery  time, cough, cold, fever, sore throat, nausea, vomiting, etc.  Please notify your surgeon if you experience dizziness, shortness of breath or blurred vision between now & the time of your surgery             15. DO NOT shave your operative site 96 hours prior to surgery. For face & neck surgery, men may use an electric razor 48 hours prior to surgery. 16. Shower the night before or morning of surgery using an antibacterial soap or as you have been instructed. 17. To provide excellent care visitors will be limited to one in the room at any given time. 18.  Please bring picture ID and insurance card.              19. Visit our web site for additional information:  [email or web address]              20.During flu season no children under the age of 15 are permitted in the hospital for the safety of all patients. 21. If you take a long acting insulin in the evening only  take half of your usual  dose the night  before your procedure              22. If you use a c-pap please bring DOS if staying overnight,             23.For your convenience [facility] has a pharmacy on site to fill your prescriptions. 24. If you use oxygen and have a portable tank please bring it  with you the DOS             25. Bring a complete list of all your medications with name and dose include any supplements. 26. Other__________________________________________   *Please call pre admission testing if you any further questions   [facility]    [address]  [phone]   [facility] [place], operated by [facility] [address] BIMAL SAENZ   [phone]           COVID TESTING    _x__ Covid test to be done 3-5 days prior to scheduled surgery -patient aware they are REQUIRED to bring a copy of the negative result DOS-if they receive a positive result to notify their surgeon         If known - indicate where patient is getting covid test done __Mff 3/10/2022  _________________________________________________________    ___ Rapid - DOS    ___ Other___________________________________      Yuliana Henley POLICY(subject to change)    There is a one visitor policy at [facility] for all surgeries and endoscopies. Whether the visitor can stay or will be asked to wait in the car will depend on the current policy and if social distancing can be maintained. The policy is subject to change at any time. Please make sure the visitor has a cell phone that is on,charged and able to accept calls, as this may be the way that the staff communicates with them. Pain management is NO VISITOR policyThe patients ride is expected to remain in the car with a cell phone for communication. If the ride is leaving the hospital grounds please make sure they are back in time for pickup. Have the patient inform the staff on arrival what their rides plans are while the patient is in the facility. At the MAIN there is one visitor allowed. Please note that the visitor policy is subject to change. All above information reviewed with patient in person or by phone. Patient verbalizes understanding. All questions and concerns addressed.                                                                                                  Patient/Rep_patient___________________                                                                                                                                    PRE OP INSTRUCTIONS

## 2022-03-10 ENCOUNTER — HOSPITAL ENCOUNTER (OUTPATIENT)
Age: 32
Discharge: HOME | End: 2022-03-10
Payer: COMMERCIAL

## 2022-03-10 ENCOUNTER — HOSPITAL ENCOUNTER (OUTPATIENT)
Dept: GENERAL RADIOLOGY | Age: 32
Discharge: HOME | End: 2022-03-10
Payer: COMMERCIAL

## 2022-03-10 DIAGNOSIS — Z01.818 PREOP TESTING: ICD-10-CM

## 2022-03-10 DIAGNOSIS — Z01.811 PRE-OP CHEST EXAM: ICD-10-CM

## 2022-03-10 LAB
A/G RATIO: 2.2 (ref 1.1–2.2)
ABO/RH: NORMAL
ALBUMIN SERPL-MCNC: 4.4 G/DL (ref 3.4–5)
ALP BLD-CCNC: 63 U/L (ref 40–129)
ALT SERPL-CCNC: 27 U/L (ref 10–40)
ANION GAP SERPL CALCULATED.3IONS-SCNC: 13 MMOL/L (ref 3–16)
ANTIBODY SCREEN: NORMAL
AST SERPL-CCNC: 30 U/L (ref 15–37)
BASOPHILS ABSOLUTE: 0.1 K/UL (ref 0–0.2)
BASOPHILS RELATIVE PERCENT: 1.2 %
BILIRUB SERPL-MCNC: 0.5 MG/DL (ref 0–1)
BILIRUBIN URINE: NEGATIVE
BLOOD, URINE: NEGATIVE
BUN BLDV-MCNC: 11 MG/DL (ref 7–20)
CALCIUM SERPL-MCNC: 9.3 MG/DL (ref 8.3–10.6)
CHLORIDE BLD-SCNC: 102 MMOL/L (ref 99–110)
CLARITY: CLEAR
CO2: 23 MMOL/L (ref 21–32)
COLOR: YELLOW
CREAT SERPL-MCNC: 0.7 MG/DL (ref 0.6–1.1)
EOSINOPHILS ABSOLUTE: 0.3 K/UL (ref 0–0.6)
EOSINOPHILS RELATIVE PERCENT: 4.5 %
GFR AFRICAN AMERICAN: >60
GFR NON-AFRICAN AMERICAN: >60
GLUCOSE BLD-MCNC: 85 MG/DL (ref 70–99)
GLUCOSE URINE: NEGATIVE MG/DL
HCT VFR BLD CALC: 39.7 % (ref 36–48)
HEMOGLOBIN: 13.5 G/DL (ref 12–16)
KETONES, URINE: NEGATIVE MG/DL
LEUKOCYTE ESTERASE, URINE: NEGATIVE
LYMPHOCYTES ABSOLUTE: 1.5 K/UL (ref 1–5.1)
LYMPHOCYTES RELATIVE PERCENT: 23.7 %
MCH RBC QN AUTO: 32.2 PG (ref 26–34)
MCHC RBC AUTO-ENTMCNC: 34.1 G/DL (ref 31–36)
MCV RBC AUTO: 94.6 FL (ref 80–100)
MICROSCOPIC EXAMINATION: NORMAL
MONOCYTES ABSOLUTE: 0.4 K/UL (ref 0–1.3)
MONOCYTES RELATIVE PERCENT: 6.3 %
NEUTROPHILS ABSOLUTE: 4 K/UL (ref 1.7–7.7)
NEUTROPHILS RELATIVE PERCENT: 64.3 %
NITRITE, URINE: NEGATIVE
PDW BLD-RTO: 12.5 % (ref 12.4–15.4)
PH UA: 7 (ref 5–8)
PLATELET # BLD: 238 K/UL (ref 135–450)
PMV BLD AUTO: 9.3 FL (ref 5–10.5)
POTASSIUM SERPL-SCNC: 4.5 MMOL/L (ref 3.5–5.1)
PROTEIN UA: NEGATIVE MG/DL
RBC # BLD: 4.2 M/UL (ref 4–5.2)
SODIUM BLD-SCNC: 138 MMOL/L (ref 136–145)
SPECIFIC GRAVITY UA: 1.02 (ref 1–1.03)
TOTAL PROTEIN: 6.4 G/DL (ref 6.4–8.2)
URINE TYPE: NORMAL
UROBILINOGEN, URINE: 0.2 E.U./DL
WBC # BLD: 6.2 K/UL (ref 4–11)

## 2022-03-10 PROCEDURE — U0003 INFECTIOUS AGENT DETECTION BY NUCLEIC ACID (DNA OR RNA); SEVERE ACUTE RESPIRATORY SYNDROME CORONAVIRUS 2 (SARS-COV-2) (CORONAVIRUS DISEASE [COVID-19]), AMPLIFIED PROBE TECHNIQUE, MAKING USE OF HIGH THROUGHPUT TECHNOLOGIES AS DESCRIBED BY CMS-2020-01-R: HCPCS

## 2022-03-10 PROCEDURE — U0005 INFEC AGEN DETEC AMPLI PROBE: HCPCS

## 2022-03-10 PROCEDURE — 80053 COMPREHEN METABOLIC PANEL: CPT

## 2022-03-10 PROCEDURE — 86850 RBC ANTIBODY SCREEN: CPT

## 2022-03-10 PROCEDURE — 36415 COLL VENOUS BLD VENIPUNCTURE: CPT

## 2022-03-10 PROCEDURE — 85025 COMPLETE CBC W/AUTO DIFF WBC: CPT

## 2022-03-10 PROCEDURE — 86901 BLOOD TYPING SEROLOGIC RH(D): CPT

## 2022-03-10 PROCEDURE — 86900 BLOOD TYPING SEROLOGIC ABO: CPT

## 2022-03-10 PROCEDURE — 87086 URINE CULTURE/COLONY COUNT: CPT

## 2022-03-10 PROCEDURE — 71046 X-RAY EXAM CHEST 2 VIEWS: CPT

## 2022-03-10 PROCEDURE — 81003 URINALYSIS AUTO W/O SCOPE: CPT

## 2022-03-11 LAB
SARS-COV-2: NOT DETECTED
URINE CULTURE, ROUTINE: NORMAL

## 2022-03-14 ENCOUNTER — ANESTHESIA (OUTPATIENT)
Dept: OPERATING ROOM | Age: 32
DRG: 512 | End: 2022-03-14
Payer: COMMERCIAL

## 2022-03-14 ENCOUNTER — HOSPITAL ENCOUNTER (INPATIENT)
Age: 32
LOS: 4 days | Discharge: HOME | DRG: 512 | End: 2022-03-18
Attending: OBSTETRICS & GYNECOLOGY | Admitting: OBSTETRICS & GYNECOLOGY
Payer: COMMERCIAL

## 2022-03-14 ENCOUNTER — ANESTHESIA EVENT (OUTPATIENT)
Dept: OPERATING ROOM | Age: 32
DRG: 512 | End: 2022-03-14
Payer: COMMERCIAL

## 2022-03-14 VITALS
OXYGEN SATURATION: 100 % | RESPIRATION RATE: 11 BRPM | DIASTOLIC BLOOD PRESSURE: 75 MMHG | SYSTOLIC BLOOD PRESSURE: 118 MMHG | TEMPERATURE: 97.5 F

## 2022-03-14 DIAGNOSIS — G89.18 POST-OP PAIN: ICD-10-CM

## 2022-03-14 DIAGNOSIS — C53.1 MALIGNANT NEOPLASM OF EXOCERVIX (HCC): Primary | ICD-10-CM

## 2022-03-14 DIAGNOSIS — F41.9 ANXIETY: ICD-10-CM

## 2022-03-14 DIAGNOSIS — Z01.818 PREOP TESTING: ICD-10-CM

## 2022-03-14 DIAGNOSIS — Z01.811 PRE-OP CHEST EXAM: ICD-10-CM

## 2022-03-14 PROBLEM — C53.9 CERVICAL CANCER (HCC): Status: ACTIVE | Noted: 2022-03-14

## 2022-03-14 LAB
ABO/RH: NORMAL
ANTIBODY SCREEN: NORMAL
HCG(URINE) PREGNANCY TEST: NEGATIVE

## 2022-03-14 PROCEDURE — 7100000001 HC PACU RECOVERY - ADDTL 15 MIN: Performed by: OBSTETRICS & GYNECOLOGY

## 2022-03-14 PROCEDURE — 84703 CHORIONIC GONADOTROPIN ASSAY: CPT

## 2022-03-14 PROCEDURE — 6370000000 HC RX 637 (ALT 250 FOR IP): Performed by: OBSTETRICS & GYNECOLOGY

## 2022-03-14 PROCEDURE — 2580000003 HC RX 258: Performed by: NURSE ANESTHETIST, CERTIFIED REGISTERED

## 2022-03-14 PROCEDURE — 2500000003 HC RX 250 WO HCPCS: Performed by: NURSE ANESTHETIST, CERTIFIED REGISTERED

## 2022-03-14 PROCEDURE — 3600000004 HC SURGERY LEVEL 4 BASE: Performed by: OBSTETRICS & GYNECOLOGY

## 2022-03-14 PROCEDURE — 6360000002 HC RX W HCPCS: Performed by: NURSE ANESTHETIST, CERTIFIED REGISTERED

## 2022-03-14 PROCEDURE — 88305 TISSUE EXAM BY PATHOLOGIST: CPT

## 2022-03-14 PROCEDURE — 2709999900 HC NON-CHARGEABLE SUPPLY: Performed by: OBSTETRICS & GYNECOLOGY

## 2022-03-14 PROCEDURE — 88342 IMHCHEM/IMCYTCHM 1ST ANTB: CPT

## 2022-03-14 PROCEDURE — 2580000003 HC RX 258: Performed by: OBSTETRICS & GYNECOLOGY

## 2022-03-14 PROCEDURE — 86850 RBC ANTIBODY SCREEN: CPT

## 2022-03-14 PROCEDURE — 3600000014 HC SURGERY LEVEL 4 ADDTL 15MIN: Performed by: OBSTETRICS & GYNECOLOGY

## 2022-03-14 PROCEDURE — 3700000001 HC ADD 15 MINUTES (ANESTHESIA): Performed by: OBSTETRICS & GYNECOLOGY

## 2022-03-14 PROCEDURE — 07BC0ZX EXCISION OF PELVIS LYMPHATIC, OPEN APPROACH, DIAGNOSTIC: ICD-10-PCS | Performed by: OBSTETRICS & GYNECOLOGY

## 2022-03-14 PROCEDURE — 6360000002 HC RX W HCPCS: Performed by: ANESTHESIOLOGY

## 2022-03-14 PROCEDURE — 6370000000 HC RX 637 (ALT 250 FOR IP): Performed by: ANESTHESIOLOGY

## 2022-03-14 PROCEDURE — 88309 TISSUE EXAM BY PATHOLOGIST: CPT

## 2022-03-14 PROCEDURE — 86901 BLOOD TYPING SEROLOGIC RH(D): CPT

## 2022-03-14 PROCEDURE — 6360000002 HC RX W HCPCS: Performed by: OBSTETRICS & GYNECOLOGY

## 2022-03-14 PROCEDURE — 0UT70ZZ RESECTION OF BILATERAL FALLOPIAN TUBES, OPEN APPROACH: ICD-10-PCS | Performed by: OBSTETRICS & GYNECOLOGY

## 2022-03-14 PROCEDURE — 6360000002 HC RX W HCPCS

## 2022-03-14 PROCEDURE — 94760 N-INVAS EAR/PLS OXIMETRY 1: CPT

## 2022-03-14 PROCEDURE — 86900 BLOOD TYPING SEROLOGIC ABO: CPT

## 2022-03-14 PROCEDURE — 1220000000 HC SEMI PRIVATE OB R&B

## 2022-03-14 PROCEDURE — 94150 VITAL CAPACITY TEST: CPT

## 2022-03-14 PROCEDURE — 7100000000 HC PACU RECOVERY - FIRST 15 MIN: Performed by: OBSTETRICS & GYNECOLOGY

## 2022-03-14 PROCEDURE — C9290 INJ, BUPIVACAINE LIPOSOME: HCPCS | Performed by: OBSTETRICS & GYNECOLOGY

## 2022-03-14 PROCEDURE — 3700000000 HC ANESTHESIA ATTENDED CARE: Performed by: OBSTETRICS & GYNECOLOGY

## 2022-03-14 PROCEDURE — 2500000003 HC RX 250 WO HCPCS: Performed by: OBSTETRICS & GYNECOLOGY

## 2022-03-14 PROCEDURE — A4217 STERILE WATER/SALINE, 500 ML: HCPCS | Performed by: OBSTETRICS & GYNECOLOGY

## 2022-03-14 PROCEDURE — 0UT90ZZ RESECTION OF UTERUS, OPEN APPROACH: ICD-10-PCS | Performed by: OBSTETRICS & GYNECOLOGY

## 2022-03-14 PROCEDURE — 94640 AIRWAY INHALATION TREATMENT: CPT

## 2022-03-14 PROCEDURE — 36415 COLL VENOUS BLD VENIPUNCTURE: CPT

## 2022-03-14 PROCEDURE — 88307 TISSUE EXAM BY PATHOLOGIST: CPT

## 2022-03-14 RX ORDER — EPHEDRINE SULFATE/0.9% NACL/PF 50 MG/5 ML
SYRINGE (ML) INTRAVENOUS PRN
Status: DISCONTINUED | OUTPATIENT
Start: 2022-03-14 | End: 2022-03-14 | Stop reason: SDUPTHER

## 2022-03-14 RX ORDER — LIDOCAINE HYDROCHLORIDE 10 MG/ML
0.5 INJECTION, SOLUTION EPIDURAL; INFILTRATION; INTRACAUDAL; PERINEURAL ONCE
Status: DISCONTINUED | OUTPATIENT
Start: 2022-03-14 | End: 2022-03-14 | Stop reason: HOSPADM

## 2022-03-14 RX ORDER — SUCCINYLCHOLINE/SOD CL,ISO/PF 200MG/10ML
SYRINGE (ML) INTRAVENOUS PRN
Status: DISCONTINUED | OUTPATIENT
Start: 2022-03-14 | End: 2022-03-14 | Stop reason: SDUPTHER

## 2022-03-14 RX ORDER — ALBUTEROL SULFATE 90 UG/1
2 AEROSOL, METERED RESPIRATORY (INHALATION) EVERY 4 HOURS PRN
Status: DISCONTINUED | OUTPATIENT
Start: 2022-03-14 | End: 2022-03-18 | Stop reason: HOSPADM

## 2022-03-14 RX ORDER — SODIUM CHLORIDE 9 MG/ML
25 INJECTION, SOLUTION INTRAVENOUS PRN
Status: DISCONTINUED | OUTPATIENT
Start: 2022-03-14 | End: 2022-03-18 | Stop reason: HOSPADM

## 2022-03-14 RX ORDER — ACETAMINOPHEN 500 MG
500 TABLET ORAL EVERY 6 HOURS
Status: DISCONTINUED | OUTPATIENT
Start: 2022-03-14 | End: 2022-03-14 | Stop reason: SDUPTHER

## 2022-03-14 RX ORDER — KETOROLAC TROMETHAMINE 30 MG/ML
INJECTION, SOLUTION INTRAMUSCULAR; INTRAVENOUS PRN
Status: DISCONTINUED | OUTPATIENT
Start: 2022-03-14 | End: 2022-03-14 | Stop reason: SDUPTHER

## 2022-03-14 RX ORDER — SODIUM CHLORIDE 0.9 % (FLUSH) 0.9 %
10 SYRINGE (ML) INJECTION EVERY 12 HOURS SCHEDULED
Status: DISCONTINUED | OUTPATIENT
Start: 2022-03-14 | End: 2022-03-18 | Stop reason: HOSPADM

## 2022-03-14 RX ORDER — SCOLOPAMINE TRANSDERMAL SYSTEM 1 MG/1
1 PATCH, EXTENDED RELEASE TRANSDERMAL ONCE
Status: COMPLETED | OUTPATIENT
Start: 2022-03-14 | End: 2022-03-17

## 2022-03-14 RX ORDER — APREPITANT 40 MG/1
40 CAPSULE ORAL ONCE
Status: COMPLETED | OUTPATIENT
Start: 2022-03-14 | End: 2022-03-14

## 2022-03-14 RX ORDER — OXYCODONE HYDROCHLORIDE 5 MG/1
5 TABLET ORAL
Status: DISCONTINUED | OUTPATIENT
Start: 2022-03-14 | End: 2022-03-14 | Stop reason: HOSPADM

## 2022-03-14 RX ORDER — DEXAMETHASONE SODIUM PHOSPHATE 4 MG/ML
INJECTION, SOLUTION INTRA-ARTICULAR; INTRALESIONAL; INTRAMUSCULAR; INTRAVENOUS; SOFT TISSUE PRN
Status: DISCONTINUED | OUTPATIENT
Start: 2022-03-14 | End: 2022-03-14 | Stop reason: SDUPTHER

## 2022-03-14 RX ORDER — ALBUTEROL SULFATE 2.5 MG/3ML
2.5 SOLUTION RESPIRATORY (INHALATION) EVERY 6 HOURS PRN
Status: DISCONTINUED | OUTPATIENT
Start: 2022-03-14 | End: 2022-03-17

## 2022-03-14 RX ORDER — TRAZODONE HYDROCHLORIDE 50 MG/1
50 TABLET ORAL NIGHTLY
Status: DISCONTINUED | OUTPATIENT
Start: 2022-03-14 | End: 2022-03-18 | Stop reason: HOSPADM

## 2022-03-14 RX ORDER — HYDROMORPHONE HYDROCHLORIDE 1 MG/ML
0.25 INJECTION, SOLUTION INTRAMUSCULAR; INTRAVENOUS; SUBCUTANEOUS
Status: DISCONTINUED | OUTPATIENT
Start: 2022-03-14 | End: 2022-03-18 | Stop reason: HOSPADM

## 2022-03-14 RX ORDER — ISOSULFAN BLUE 50 MG/5ML
INJECTION, SOLUTION SUBCUTANEOUS
Status: COMPLETED | OUTPATIENT
Start: 2022-03-14 | End: 2022-03-14

## 2022-03-14 RX ORDER — SODIUM CHLORIDE, SODIUM LACTATE, POTASSIUM CHLORIDE, CALCIUM CHLORIDE 600; 310; 30; 20 MG/100ML; MG/100ML; MG/100ML; MG/100ML
INJECTION, SOLUTION INTRAVENOUS CONTINUOUS PRN
Status: DISCONTINUED | OUTPATIENT
Start: 2022-03-14 | End: 2022-03-14 | Stop reason: SDUPTHER

## 2022-03-14 RX ORDER — LIDOCAINE HYDROCHLORIDE 20 MG/ML
INJECTION, SOLUTION EPIDURAL; INFILTRATION; INTRACAUDAL; PERINEURAL PRN
Status: DISCONTINUED | OUTPATIENT
Start: 2022-03-14 | End: 2022-03-14 | Stop reason: SDUPTHER

## 2022-03-14 RX ORDER — ONDANSETRON 2 MG/ML
4 INJECTION INTRAMUSCULAR; INTRAVENOUS
Status: COMPLETED | OUTPATIENT
Start: 2022-03-14 | End: 2022-03-14

## 2022-03-14 RX ORDER — HYDROMORPHONE HCL 110MG/55ML
PATIENT CONTROLLED ANALGESIA SYRINGE INTRAVENOUS PRN
Status: DISCONTINUED | OUTPATIENT
Start: 2022-03-14 | End: 2022-03-14 | Stop reason: SDUPTHER

## 2022-03-14 RX ORDER — SODIUM CHLORIDE 0.9 % (FLUSH) 0.9 %
10 SYRINGE (ML) INJECTION PRN
Status: DISCONTINUED | OUTPATIENT
Start: 2022-03-14 | End: 2022-03-18 | Stop reason: HOSPADM

## 2022-03-14 RX ORDER — PROPOFOL 10 MG/ML
INJECTION, EMULSION INTRAVENOUS PRN
Status: DISCONTINUED | OUTPATIENT
Start: 2022-03-14 | End: 2022-03-14 | Stop reason: SDUPTHER

## 2022-03-14 RX ORDER — GLYCOPYRROLATE 1 MG/5 ML
SYRINGE (ML) INTRAVENOUS PRN
Status: DISCONTINUED | OUTPATIENT
Start: 2022-03-14 | End: 2022-03-14 | Stop reason: SDUPTHER

## 2022-03-14 RX ORDER — BUPIVACAINE HYDROCHLORIDE 5 MG/ML
INJECTION, SOLUTION EPIDURAL; INTRACAUDAL
Status: COMPLETED | OUTPATIENT
Start: 2022-03-14 | End: 2022-03-14

## 2022-03-14 RX ORDER — LORAZEPAM 2 MG/ML
0.5 INJECTION INTRAMUSCULAR EVERY 8 HOURS PRN
Status: DISCONTINUED | OUTPATIENT
Start: 2022-03-14 | End: 2022-03-18 | Stop reason: HOSPADM

## 2022-03-14 RX ORDER — FLUTICASONE PROPIONATE 50 MCG
2 SPRAY, SUSPENSION (ML) NASAL DAILY
Status: DISCONTINUED | OUTPATIENT
Start: 2022-03-15 | End: 2022-03-18 | Stop reason: HOSPADM

## 2022-03-14 RX ORDER — POLYETHYLENE GLYCOL 3350 17 G/17G
17 POWDER, FOR SOLUTION ORAL DAILY
Status: DISCONTINUED | OUTPATIENT
Start: 2022-03-15 | End: 2022-03-18 | Stop reason: HOSPADM

## 2022-03-14 RX ORDER — CELECOXIB 200 MG/1
200 CAPSULE ORAL ONCE
Status: COMPLETED | OUTPATIENT
Start: 2022-03-14 | End: 2022-03-14

## 2022-03-14 RX ORDER — MEPERIDINE HYDROCHLORIDE 25 MG/ML
12.5 INJECTION INTRAMUSCULAR; INTRAVENOUS; SUBCUTANEOUS EVERY 5 MIN PRN
Status: DISCONTINUED | OUTPATIENT
Start: 2022-03-14 | End: 2022-03-14 | Stop reason: HOSPADM

## 2022-03-14 RX ORDER — ACETAMINOPHEN 325 MG/1
650 TABLET ORAL ONCE
Status: COMPLETED | OUTPATIENT
Start: 2022-03-14 | End: 2022-03-14

## 2022-03-14 RX ORDER — OXYCODONE HYDROCHLORIDE 5 MG/1
10 TABLET ORAL EVERY 4 HOURS PRN
Status: DISCONTINUED | OUTPATIENT
Start: 2022-03-14 | End: 2022-03-15

## 2022-03-14 RX ORDER — HYDROMORPHONE HYDROCHLORIDE 1 MG/ML
0.5 INJECTION, SOLUTION INTRAMUSCULAR; INTRAVENOUS; SUBCUTANEOUS
Status: DISCONTINUED | OUTPATIENT
Start: 2022-03-14 | End: 2022-03-18 | Stop reason: HOSPADM

## 2022-03-14 RX ORDER — ONDANSETRON 4 MG/1
4 TABLET, ORALLY DISINTEGRATING ORAL EVERY 8 HOURS PRN
Status: DISCONTINUED | OUTPATIENT
Start: 2022-03-14 | End: 2022-03-18 | Stop reason: HOSPADM

## 2022-03-14 RX ORDER — LABETALOL HYDROCHLORIDE 5 MG/ML
10 INJECTION, SOLUTION INTRAVENOUS
Status: DISCONTINUED | OUTPATIENT
Start: 2022-03-14 | End: 2022-03-14 | Stop reason: HOSPADM

## 2022-03-14 RX ORDER — ACETAMINOPHEN 500 MG
500 TABLET ORAL EVERY 6 HOURS
Status: DISCONTINUED | OUTPATIENT
Start: 2022-03-14 | End: 2022-03-18 | Stop reason: HOSPADM

## 2022-03-14 RX ORDER — VECURONIUM BROMIDE 1 MG/ML
INJECTION, POWDER, LYOPHILIZED, FOR SOLUTION INTRAVENOUS PRN
Status: DISCONTINUED | OUTPATIENT
Start: 2022-03-14 | End: 2022-03-14 | Stop reason: SDUPTHER

## 2022-03-14 RX ORDER — ONDANSETRON 2 MG/ML
INJECTION INTRAMUSCULAR; INTRAVENOUS PRN
Status: DISCONTINUED | OUTPATIENT
Start: 2022-03-14 | End: 2022-03-14 | Stop reason: SDUPTHER

## 2022-03-14 RX ORDER — PANTOPRAZOLE SODIUM 40 MG/1
40 TABLET, DELAYED RELEASE ORAL
Status: DISCONTINUED | OUTPATIENT
Start: 2022-03-15 | End: 2022-03-18 | Stop reason: HOSPADM

## 2022-03-14 RX ORDER — SODIUM CHLORIDE, SODIUM LACTATE, POTASSIUM CHLORIDE, CALCIUM CHLORIDE 600; 310; 30; 20 MG/100ML; MG/100ML; MG/100ML; MG/100ML
INJECTION, SOLUTION INTRAVENOUS CONTINUOUS
Status: DISCONTINUED | OUTPATIENT
Start: 2022-03-14 | End: 2022-03-14

## 2022-03-14 RX ORDER — BUDESONIDE AND FORMOTEROL FUMARATE DIHYDRATE 160; 4.5 UG/1; UG/1
2 AEROSOL RESPIRATORY (INHALATION) 2 TIMES DAILY
Status: DISCONTINUED | OUTPATIENT
Start: 2022-03-14 | End: 2022-03-18 | Stop reason: HOSPADM

## 2022-03-14 RX ORDER — OXYCODONE HYDROCHLORIDE 5 MG/1
5 TABLET ORAL EVERY 4 HOURS PRN
Status: DISCONTINUED | OUTPATIENT
Start: 2022-03-14 | End: 2022-03-15

## 2022-03-14 RX ORDER — MAGNESIUM SULFATE HEPTAHYDRATE 500 MG/ML
INJECTION, SOLUTION INTRAMUSCULAR; INTRAVENOUS PRN
Status: DISCONTINUED | OUTPATIENT
Start: 2022-03-14 | End: 2022-03-14 | Stop reason: SDUPTHER

## 2022-03-14 RX ORDER — CETIRIZINE HYDROCHLORIDE 10 MG/1
10 TABLET ORAL DAILY
Status: DISCONTINUED | OUTPATIENT
Start: 2022-03-14 | End: 2022-03-18 | Stop reason: HOSPADM

## 2022-03-14 RX ORDER — INDOCYANINE GREEN AND WATER 25 MG
KIT INJECTION
Status: COMPLETED | OUTPATIENT
Start: 2022-03-14 | End: 2022-03-14

## 2022-03-14 RX ORDER — SODIUM CHLORIDE 9 MG/ML
INJECTION, SOLUTION INTRAVENOUS CONTINUOUS
Status: DISCONTINUED | OUTPATIENT
Start: 2022-03-14 | End: 2022-03-15

## 2022-03-14 RX ORDER — MIDAZOLAM HYDROCHLORIDE 1 MG/ML
INJECTION INTRAMUSCULAR; INTRAVENOUS
Status: COMPLETED
Start: 2022-03-14 | End: 2022-03-14

## 2022-03-14 RX ORDER — MAGNESIUM HYDROXIDE 1200 MG/15ML
LIQUID ORAL CONTINUOUS PRN
Status: COMPLETED | OUTPATIENT
Start: 2022-03-14 | End: 2022-03-14

## 2022-03-14 RX ORDER — KETAMINE HCL IN NACL, ISO-OSM 100MG/10ML
SYRINGE (ML) INJECTION PRN
Status: DISCONTINUED | OUTPATIENT
Start: 2022-03-14 | End: 2022-03-14 | Stop reason: SDUPTHER

## 2022-03-14 RX ORDER — SENNA AND DOCUSATE SODIUM 50; 8.6 MG/1; MG/1
1 TABLET, FILM COATED ORAL 2 TIMES DAILY
Status: DISCONTINUED | OUTPATIENT
Start: 2022-03-14 | End: 2022-03-18 | Stop reason: HOSPADM

## 2022-03-14 RX ORDER — HYDRALAZINE HYDROCHLORIDE 20 MG/ML
10 INJECTION INTRAMUSCULAR; INTRAVENOUS
Status: DISCONTINUED | OUTPATIENT
Start: 2022-03-14 | End: 2022-03-14 | Stop reason: HOSPADM

## 2022-03-14 RX ORDER — MIDAZOLAM HYDROCHLORIDE 1 MG/ML
INJECTION INTRAMUSCULAR; INTRAVENOUS PRN
Status: DISCONTINUED | OUTPATIENT
Start: 2022-03-14 | End: 2022-03-14 | Stop reason: SDUPTHER

## 2022-03-14 RX ORDER — KETOROLAC TROMETHAMINE 15 MG/ML
15 INJECTION, SOLUTION INTRAMUSCULAR; INTRAVENOUS EVERY 6 HOURS
Status: DISPENSED | OUTPATIENT
Start: 2022-03-14 | End: 2022-03-16

## 2022-03-14 RX ORDER — ONDANSETRON 2 MG/ML
4 INJECTION INTRAMUSCULAR; INTRAVENOUS EVERY 6 HOURS PRN
Status: DISCONTINUED | OUTPATIENT
Start: 2022-03-14 | End: 2022-03-18 | Stop reason: HOSPADM

## 2022-03-14 RX ORDER — HYDROMORPHONE HCL 110MG/55ML
0.5 PATIENT CONTROLLED ANALGESIA SYRINGE INTRAVENOUS EVERY 5 MIN PRN
Status: COMPLETED | OUTPATIENT
Start: 2022-03-14 | End: 2022-03-14

## 2022-03-14 RX ORDER — FENTANYL CITRATE 50 UG/ML
INJECTION, SOLUTION INTRAMUSCULAR; INTRAVENOUS PRN
Status: DISCONTINUED | OUTPATIENT
Start: 2022-03-14 | End: 2022-03-14 | Stop reason: SDUPTHER

## 2022-03-14 RX ADMIN — ONDANSETRON 4 MG: 2 INJECTION INTRAMUSCULAR; INTRAVENOUS at 08:45

## 2022-03-14 RX ADMIN — APREPITANT 40 MG: 40 CAPSULE ORAL at 08:02

## 2022-03-14 RX ADMIN — KETOROLAC TROMETHAMINE 30 MG: 60 INJECTION, SOLUTION INTRAMUSCULAR at 12:01

## 2022-03-14 RX ADMIN — Medication 2 PUFF: at 19:08

## 2022-03-14 RX ADMIN — MAGNESIUM SULFATE HEPTAHYDRATE 1 G: 500 INJECTION, SOLUTION INTRAMUSCULAR; INTRAVENOUS at 08:55

## 2022-03-14 RX ADMIN — MEPERIDINE HYDROCHLORIDE 12.5 MG: 25 INJECTION INTRAMUSCULAR; INTRAVENOUS; SUBCUTANEOUS at 13:20

## 2022-03-14 RX ADMIN — Medication 0.2 MG: at 08:25

## 2022-03-14 RX ADMIN — PROPOFOL 200 MG: 10 INJECTION, EMULSION INTRAVENOUS at 08:35

## 2022-03-14 RX ADMIN — Medication 10 MG: at 08:22

## 2022-03-14 RX ADMIN — MEPERIDINE HYDROCHLORIDE 12.5 MG: 25 INJECTION INTRAMUSCULAR; INTRAVENOUS; SUBCUTANEOUS at 12:31

## 2022-03-14 RX ADMIN — OXYCODONE 10 MG: 5 TABLET ORAL at 21:14

## 2022-03-14 RX ADMIN — ONDANSETRON 4 MG: 2 INJECTION INTRAMUSCULAR; INTRAVENOUS at 12:30

## 2022-03-14 RX ADMIN — HYDROMORPHONE HYDROCHLORIDE 0.5 MG: 2 INJECTION, SOLUTION INTRAMUSCULAR; INTRAVENOUS; SUBCUTANEOUS at 11:20

## 2022-03-14 RX ADMIN — ACETAMINOPHEN 500 MG: 500 TABLET ORAL at 21:14

## 2022-03-14 RX ADMIN — SODIUM CHLORIDE, POTASSIUM CHLORIDE, SODIUM LACTATE AND CALCIUM CHLORIDE: 600; 310; 30; 20 INJECTION, SOLUTION INTRAVENOUS at 14:29

## 2022-03-14 RX ADMIN — HYDROMORPHONE HYDROCHLORIDE 0.5 MG: 2 INJECTION, SOLUTION INTRAMUSCULAR; INTRAVENOUS; SUBCUTANEOUS at 13:20

## 2022-03-14 RX ADMIN — KETOROLAC TROMETHAMINE 15 MG: 15 INJECTION, SOLUTION INTRAMUSCULAR; INTRAVENOUS at 18:00

## 2022-03-14 RX ADMIN — HYDROMORPHONE HYDROCHLORIDE 0.5 MG: 2 INJECTION, SOLUTION INTRAMUSCULAR; INTRAVENOUS; SUBCUTANEOUS at 12:30

## 2022-03-14 RX ADMIN — VECURONIUM BROMIDE 5 MG: 1 INJECTION, POWDER, LYOPHILIZED, FOR SOLUTION INTRAVENOUS at 09:50

## 2022-03-14 RX ADMIN — Medication 10 MG: at 10:09

## 2022-03-14 RX ADMIN — Medication 10 MG: at 10:31

## 2022-03-14 RX ADMIN — VECURONIUM BROMIDE 5 MG: 1 INJECTION, POWDER, LYOPHILIZED, FOR SOLUTION INTRAVENOUS at 10:49

## 2022-03-14 RX ADMIN — OXYCODONE 10 MG: 5 TABLET ORAL at 15:18

## 2022-03-14 RX ADMIN — SODIUM CHLORIDE: 9 INJECTION, SOLUTION INTRAVENOUS at 16:47

## 2022-03-14 RX ADMIN — SODIUM CHLORIDE, POTASSIUM CHLORIDE, SODIUM LACTATE AND CALCIUM CHLORIDE: 600; 310; 30; 20 INJECTION, SOLUTION INTRAVENOUS at 08:05

## 2022-03-14 RX ADMIN — CETIRIZINE HYDROCHLORIDE 10 MG: 10 TABLET, FILM COATED ORAL at 16:35

## 2022-03-14 RX ADMIN — LIDOCAINE HYDROCHLORIDE 100 MG: 20 INJECTION, SOLUTION EPIDURAL; INFILTRATION; INTRACAUDAL; PERINEURAL at 08:35

## 2022-03-14 RX ADMIN — VECURONIUM BROMIDE 3 MG: 1 INJECTION, POWDER, LYOPHILIZED, FOR SOLUTION INTRAVENOUS at 11:34

## 2022-03-14 RX ADMIN — HYDROMORPHONE HYDROCHLORIDE 0.5 MG: 2 INJECTION, SOLUTION INTRAMUSCULAR; INTRAVENOUS; SUBCUTANEOUS at 13:10

## 2022-03-14 RX ADMIN — DEXAMETHASONE SODIUM PHOSPHATE 8 MG: 4 INJECTION, SOLUTION INTRAMUSCULAR; INTRAVENOUS at 08:45

## 2022-03-14 RX ADMIN — CEFAZOLIN 2000 MG: 10 INJECTION, POWDER, FOR SOLUTION INTRAVENOUS at 08:28

## 2022-03-14 RX ADMIN — Medication 10 MG: at 10:25

## 2022-03-14 RX ADMIN — MIDAZOLAM 2 MG: 1 INJECTION INTRAMUSCULAR; INTRAVENOUS at 08:25

## 2022-03-14 RX ADMIN — VECURONIUM BROMIDE 10 MG: 1 INJECTION, POWDER, LYOPHILIZED, FOR SOLUTION INTRAVENOUS at 08:45

## 2022-03-14 RX ADMIN — HYDROMORPHONE HYDROCHLORIDE 0.5 MG: 2 INJECTION, SOLUTION INTRAMUSCULAR; INTRAVENOUS; SUBCUTANEOUS at 13:47

## 2022-03-14 RX ADMIN — TRAZODONE HYDROCHLORIDE 50 MG: 50 TABLET ORAL at 21:14

## 2022-03-14 RX ADMIN — ACETAMINOPHEN 500 MG: 500 TABLET ORAL at 15:18

## 2022-03-14 RX ADMIN — Medication 10 MG: at 11:47

## 2022-03-14 RX ADMIN — MIDAZOLAM 2 MG: 1 INJECTION INTRAMUSCULAR; INTRAVENOUS at 08:03

## 2022-03-14 RX ADMIN — Medication 170 MG: at 08:35

## 2022-03-14 RX ADMIN — SUGAMMADEX 200 MG: 100 INJECTION, SOLUTION INTRAVENOUS at 12:15

## 2022-03-14 RX ADMIN — FENTANYL CITRATE 50 MCG: 50 INJECTION, SOLUTION INTRAMUSCULAR; INTRAVENOUS at 08:35

## 2022-03-14 RX ADMIN — SENNOSIDES AND DOCUSATE SODIUM 1 TABLET: 50; 8.6 TABLET ORAL at 21:14

## 2022-03-14 RX ADMIN — SODIUM CHLORIDE, POTASSIUM CHLORIDE, SODIUM LACTATE AND CALCIUM CHLORIDE: 600; 310; 30; 20 INJECTION, SOLUTION INTRAVENOUS at 08:30

## 2022-03-14 RX ADMIN — CELECOXIB 200 MG: 200 CAPSULE ORAL at 08:03

## 2022-03-14 RX ADMIN — ACETAMINOPHEN 650 MG: 325 TABLET ORAL at 08:02

## 2022-03-14 RX ADMIN — SODIUM CHLORIDE, POTASSIUM CHLORIDE, SODIUM LACTATE AND CALCIUM CHLORIDE: 600; 310; 30; 20 INJECTION, SOLUTION INTRAVENOUS at 10:24

## 2022-03-14 ASSESSMENT — PULMONARY FUNCTION TESTS
PIF_VALUE: 23
PIF_VALUE: 25
PIF_VALUE: 24
PIF_VALUE: 20
PIF_VALUE: 18
PIF_VALUE: 23
PIF_VALUE: 24
PIF_VALUE: 19
PIF_VALUE: 2
PIF_VALUE: 22
PIF_VALUE: 27
PIF_VALUE: 26
PIF_VALUE: 23
PIF_VALUE: 23
PIF_VALUE: 24
PIF_VALUE: 23
PIF_VALUE: 23
PIF_VALUE: 24
PIF_VALUE: 23
PIF_VALUE: 24
PIF_VALUE: 22
PIF_VALUE: 25
PIF_VALUE: 23
PIF_VALUE: 0
PIF_VALUE: 24
PIF_VALUE: 18
PIF_VALUE: 24
PIF_VALUE: 26
PIF_VALUE: 23
PIF_VALUE: 23
PIF_VALUE: 24
PIF_VALUE: 23
PIF_VALUE: 25
PIF_VALUE: 1
PIF_VALUE: 26
PIF_VALUE: 20
PIF_VALUE: 24
PIF_VALUE: 18
PIF_VALUE: 25
PIF_VALUE: 23
PIF_VALUE: 24
PIF_VALUE: 23
PIF_VALUE: 20
PIF_VALUE: 22
PIF_VALUE: 25
PIF_VALUE: 27
PIF_VALUE: 25
PIF_VALUE: 17
PIF_VALUE: 26
PIF_VALUE: 9
PIF_VALUE: 19
PIF_VALUE: 24
PIF_VALUE: 25
PIF_VALUE: 24
PIF_VALUE: 18
PIF_VALUE: 23
PIF_VALUE: 23
PIF_VALUE: 21
PIF_VALUE: 27
PIF_VALUE: 23
PIF_VALUE: 22
PIF_VALUE: 8
PIF_VALUE: 22
PIF_VALUE: 24
PIF_VALUE: 0
PIF_VALUE: 23
PIF_VALUE: 23
PIF_VALUE: 27
PIF_VALUE: 19
PIF_VALUE: 25
PIF_VALUE: 23
PIF_VALUE: 27
PIF_VALUE: 26
PIF_VALUE: 23
PIF_VALUE: 25
PIF_VALUE: 21
PIF_VALUE: 24
PIF_VALUE: 25
PIF_VALUE: 23
PIF_VALUE: 24
PIF_VALUE: 25
PIF_VALUE: 26
PIF_VALUE: 24
PIF_VALUE: 23
PIF_VALUE: 1
PIF_VALUE: 26
PIF_VALUE: 24
PIF_VALUE: 19
PIF_VALUE: 20
PIF_VALUE: 25
PIF_VALUE: 19
PIF_VALUE: 20
PIF_VALUE: 23
PIF_VALUE: 22
PIF_VALUE: 24
PIF_VALUE: 24
PIF_VALUE: 26
PIF_VALUE: 20
PIF_VALUE: 23
PIF_VALUE: 24
PIF_VALUE: 25
PIF_VALUE: 25
PIF_VALUE: 19
PIF_VALUE: 24
PIF_VALUE: 25
PIF_VALUE: 19
PIF_VALUE: 20
PIF_VALUE: 27
PIF_VALUE: 20
PIF_VALUE: 25
PIF_VALUE: 19
PIF_VALUE: 26
PIF_VALUE: 25
PIF_VALUE: 23
PIF_VALUE: 24
PIF_VALUE: 23
PIF_VALUE: 24
PIF_VALUE: 23
PIF_VALUE: 21
PIF_VALUE: 22
PIF_VALUE: 20
PIF_VALUE: 24
PIF_VALUE: 23
PIF_VALUE: 24
PIF_VALUE: 24
PIF_VALUE: 23
PIF_VALUE: 20
PIF_VALUE: 23
PIF_VALUE: 26
PIF_VALUE: 25
PIF_VALUE: 23
PIF_VALUE: 18
PIF_VALUE: 19
PIF_VALUE: 21
PIF_VALUE: 24
PIF_VALUE: 26
PIF_VALUE: 23
PIF_VALUE: 21
PIF_VALUE: 19
PIF_VALUE: 25
PIF_VALUE: 18
PIF_VALUE: 23
PIF_VALUE: 22
PIF_VALUE: 2
PIF_VALUE: 26
PIF_VALUE: 25
PIF_VALUE: 24
PIF_VALUE: 19
PIF_VALUE: 26
PIF_VALUE: 23
PIF_VALUE: 27
PIF_VALUE: 23
PIF_VALUE: 2
PIF_VALUE: 23
PIF_VALUE: 24
PIF_VALUE: 24
PIF_VALUE: 25
PIF_VALUE: 22
PIF_VALUE: 25
PIF_VALUE: 19
PIF_VALUE: 23
PIF_VALUE: 24
PIF_VALUE: 24
PIF_VALUE: 23
PIF_VALUE: 25
PIF_VALUE: 25
PIF_VALUE: 20
PIF_VALUE: 20
PIF_VALUE: 23
PIF_VALUE: 19
PIF_VALUE: 25
PIF_VALUE: 1
PIF_VALUE: 25
PIF_VALUE: 24
PIF_VALUE: 23
PIF_VALUE: 24
PIF_VALUE: 26
PIF_VALUE: 26
PIF_VALUE: 25
PIF_VALUE: 19
PIF_VALUE: 7
PIF_VALUE: 24
PIF_VALUE: 22
PIF_VALUE: 26
PIF_VALUE: 23
PIF_VALUE: 24
PIF_VALUE: 26
PIF_VALUE: 21
PIF_VALUE: 24
PIF_VALUE: 21
PIF_VALUE: 25
PIF_VALUE: 22
PIF_VALUE: 22
PIF_VALUE: 20
PIF_VALUE: 22
PIF_VALUE: 24
PIF_VALUE: 23
PIF_VALUE: 25
PIF_VALUE: 20
PIF_VALUE: 18
PIF_VALUE: 26
PIF_VALUE: 19
PIF_VALUE: 24
PIF_VALUE: 23
PIF_VALUE: 19
PIF_VALUE: 19
PIF_VALUE: 26
PIF_VALUE: 22
PIF_VALUE: 19
PIF_VALUE: 21
PIF_VALUE: 26
PIF_VALUE: 26
PIF_VALUE: 20
PIF_VALUE: 24
PIF_VALUE: 23
PIF_VALUE: 24
PIF_VALUE: 22
PIF_VALUE: 24
PIF_VALUE: 24
PIF_VALUE: 19
PIF_VALUE: 19
PIF_VALUE: 27
PIF_VALUE: 24
PIF_VALUE: 28

## 2022-03-14 ASSESSMENT — PAIN SCALES - GENERAL
PAINLEVEL_OUTOF10: 7
PAINLEVEL_OUTOF10: 6
PAINLEVEL_OUTOF10: 0
PAINLEVEL_OUTOF10: 8
PAINLEVEL_OUTOF10: 8
PAINLEVEL_OUTOF10: 7
PAINLEVEL_OUTOF10: 0
PAINLEVEL_OUTOF10: 8
PAINLEVEL_OUTOF10: 0
PAINLEVEL_OUTOF10: 4

## 2022-03-14 ASSESSMENT — PAIN DESCRIPTION - DESCRIPTORS
DESCRIPTORS: CRAMPING
DESCRIPTORS: CRAMPING

## 2022-03-14 ASSESSMENT — LIFESTYLE VARIABLES: SMOKING_STATUS: 0

## 2022-03-14 ASSESSMENT — PAIN DESCRIPTION - LOCATION
LOCATION: ABDOMEN
LOCATION: ABDOMEN

## 2022-03-14 ASSESSMENT — PAIN - FUNCTIONAL ASSESSMENT: PAIN_FUNCTIONAL_ASSESSMENT: 0-10

## 2022-03-14 ASSESSMENT — PAIN DESCRIPTION - PROGRESSION
CLINICAL_PROGRESSION: GRADUALLY WORSENING
CLINICAL_PROGRESSION: GRADUALLY IMPROVING

## 2022-03-14 ASSESSMENT — PAIN DESCRIPTION - PAIN TYPE
TYPE: SURGICAL PAIN
TYPE: SURGICAL PAIN

## 2022-03-14 NOTE — ANESTHESIA PRE PROCEDURE
Department of Anesthesiology  Preprocedure Note       Name:  Aida Cevallos   Age:  29 y.o.  :  1990                                          MRN:  [id number]         Date:  3/14/2022      Surgeon: Nidhi Germain):  Elina Patton MD    Procedure: Procedure(s):  ABDOMINAL RADIAL HYSTERECTOMY, BILATERAL SALPINGECTOMY, SENTINEL LYMPH NODE BIOPSY, PELVIC LYMPH NODE DISSECTION, OVARIAN TRANSPOSITION ([phone], [id number], [id number])    Medications prior to admission:   Prior to Admission medications    Medication Sig Start Date End Date Taking?  Authorizing Provider   Calcium Citrate-Vitamin D (CITRACAL + D PO) Take by mouth   Yes Historical Provider, MD   Multiple Vitamin (MVI, BARIATRIC ADVANTAGE VITABAND, CHEW TAB) Take 1 tablet by mouth daily   Yes Historical Provider, MD   acetaminophen (TYLENOL) 500 MG tablet Take 1 tablet by mouth every 6 hours as needed for Pain 10/28/21  Yes Elina Patton MD   budesonide-formoterol (SYMBICORT) 160-4.5 MCG/ACT AERO INHALE 2 PUFFS BY MOUTH INTO THE LUNGS TWO TIMES A DAY 20  Yes Historical Provider, MD   albuterol sulfate  (90 Base) MCG/ACT inhaler INHALE 2 PUFFS INTO THE LUNGS EVERY 4 TO 6 HOURS AS NEEDED 21  Yes Historical Provider, MD   loratadine (CLARITIN) 10 MG tablet Take 10 mg by mouth daily   Yes Historical Provider, MD   traZODone (DESYREL) 50 MG tablet Take 50 mg by mouth nightly   Yes Historical Provider, MD   albuterol (PROVENTIL) (2.5 MG/3ML) 0.083% nebulizer solution Take 2.5 mg by nebulization every 6 hours as needed for Wheezing   Yes Historical Provider, MD   omeprazole (PRILOSEC) 10 MG capsule Take 10 mg by mouth in the morning and at bedtime    Yes Historical Provider, MD   mometasone (NASONEX) 50 MCG/ACT nasal spray 2 sprays by Nasal route daily   Yes Historical Provider, MD       Current medications:    Current Facility-Administered Medications   Medication Dose Route Frequency Provider Last Rate Last Admin    lactated ringers infusion   IntraVENous Continuous Aria Chacon MD        ceFAZolin (ANCEF) 2000 mg in dextrose 5 % 50 mL IVPB  2,000 mg IntraVENous On Call to [address] [place] [address] [place], MD        lidocaine PF 1 % injection 0.5 mL  0.5 mL IntraDERmal Once Aria Chacon MD        meperidine (DEMEROL) injection 12.5 mg  12.5 mg IntraVENous Q5 Min PRN Shannon Gardner MD        HYDROmorphone (DILAUDID) injection 0.5 mg  0.5 mg IntraVENous Q5 Min PRN Shannon Gardner MD        oxyCODONE (ROXICODONE) immediate release tablet 5 mg  5 mg Oral Once PRN Shannon Gardner MD        ondansetron [facility]) injection 4 mg  4 mg IntraVENous Once PRN Shannon Gardner MD        labetalol (NORMODYNE;TRANDATE) injection 10 mg  10 mg IntraVENous Q15 Min PRN Shannon Gardner MD        Or    hydrALAZINE (APRESOLINE) injection 10 mg  10 mg IntraVENous Q15 Min PRN Shannon Gardner MD        aprepitant (EMEND) capsule 40 mg  40 mg Oral Once Shannon Gardner MD        scopolamine (TRANSDERM-SCOP) transdermal patch 1 patch  1 patch TransDERmal Once Shannon Gardner MD        acetaminophen (TYLENOL) tablet 650 mg  650 mg Oral Once Shannon Gardner MD        celecoxib (CELEBREX) capsule 200 mg  200 mg Oral Once Shannon Gardner MD           Allergies:     Allergies   Allergen Reactions    Other      History of bariatric surgery       Problem List:    Patient Active Problem List   Diagnosis Code    Malignant neoplasm of exocervix ([facility] 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.1       Past Medical History:        Diagnosis Date    ADHD (attention deficit hyperactivity disorder)     Asthma     [facility]     B[facility]     COVID-19 2020    Depression     Irregular periods     PONV (postoperative nausea and vomiting)        Past Surgical History:        Procedure Laterality Date    CERVIX BIOPSY N/A 10/28/2021    CERVICAL BIOPSIES,  LOOP EXCISIONAL ELECTROCAUTERY PROCEDURE performed by Aria Chacon MD at [address] [place] [address]  11/2021    STOMACH SURGERY  06/2021    sleeve gastrectomy    WISDOM TOOTH EXTRACTION         Social History:    Social History     Tobacco Use    Smoking status: Never Smoker    Smokeless tobacco: Never Used   Substance Use Topics    Alcohol use: Yes     Alcohol/week: 3.0 standard drinks     Types: 3 Glasses of wine per week     Comment: social/ weekly                                Counseling given: Not Answered      Vital Signs (Current):   Vitals:    03/08/22 1318 03/14/22 0723   BP:  122/85   Pulse:  77   Resp:  18   Temp:  (!) 49.5 °F (9.7 °C)   TempSrc:  Temporal   SpO2:  99%   Weight: 198 lb (89.8 kg) 199 lb 14.4 oz (90.7 kg)   Height: 5' 7\" (1.702 m)                                               BP Readings from Last 3 Encounters:   03/14/22 122/85   01/20/22 126/82   10/28/21 (!) 89/49       NPO Status: Time of last liquid consumption: 0600                        Time of last solid consumption: 0000                        Date of last liquid consumption: 03/14/22                        Date of last solid food consumption: 03/13/22    BMI:   Wt Readings from Last 3 Encounters:   03/14/22 199 lb 14.4 oz (90.7 kg)   01/20/22 207 lb 12.8 oz (94.3 kg)   10/28/21 227 lb 11.8 oz (103.3 kg)     Body mass index is 31.31 kg/m².     CBC:   Lab Results   Component Value Date    WBC 6.2 03/10/2022    RBC 4.20 03/10/2022    HGB 13.5 03/10/2022    HCT 39.7 03/10/2022    MCV 94.6 03/10/2022    RDW 12.5 03/10/2022     03/10/2022       CMP:   Lab Results   Component Value Date     03/10/2022    K 4.5 03/10/2022     03/10/2022    CO2 23 03/10/2022    BUN 11 03/10/2022    CREATININE 0.7 03/10/2022    GFRAA >60 03/10/2022    AGRATIO 2.2 03/10/2022    LABGLOM >60 03/10/2022    GLUCOSE 85 03/10/2022    PROT 6.4 03/10/2022    CALCIUM 9.3 03/10/2022    BILITOT 0.5 03/10/2022    ALKPHOS 63 03/10/2022    AST 30 03/10/2022    ALT 27 03/10/2022       POC Tests: No results for input(s): POCGLU, POCNA, POCK, POCCL, POCBUN, Shereen Villalba in the last 72 hours. Coags: No results found for: PROTIME, INR, APTT    HCG (If Applicable):   Lab Results   Component Value Date    PREGTESTUR Negative 03/14/2022        ABGs: No results found for: PHART, PO2ART, NWX2VYS, NXA0DPU, BEART, Z2ORGTTD     Type & Screen (If Applicable):  No results found for: LABABO, LABRH    Drug/Infectious Status (If Applicable):  Lab Results   Component Value Date    HEPCAB Non-Reactive (Negative) 12/27/2011       COVID-19 Screening (If Applicable):   Lab Results   Component Value Date    COVID19 Not Detected 03/10/2022           Anesthesia Evaluation  Patient summary reviewed and Nursing notes reviewed   history of anesthetic complications: PONV. Airway: Mallampati: III  TM distance: >3 FB   Neck ROM: full  Mouth opening: > = 3 FB Dental: normal exam         Pulmonary:normal exam  breath sounds clear to auscultation  (+) asthma (mod persistent, reg inh use, no recent exacerbations):     (-) sleep apnea and not a current smoker                           Cardiovascular:Negative CV ROS        (-) hypertension, past MI, CAD, CABG/stent, dysrhythmias,  angina and  CHF      Rhythm: regular  Rate: normal                    Neuro/Psych:   (+) psychiatric history (ADHD, Bipolar):   (-) seizures, TIA and CVA           GI/Hepatic/Renal:   (+) GERD: well controlled,      (-) liver disease and no renal disease       Endo/Other: Negative Endo/Other ROS       (-) diabetes mellitus, hypothyroidism, hyperthyroidism               Abdominal:   (+) obese,           Vascular: Other Findings:           Anesthesia Plan      general     ASA 2       Induction: intravenous. MIPS: Postoperative opioids intended and Prophylactic antiemetics administered. Anesthetic plan and risks discussed with patient. Plan discussed with CRNA.                   Laurie Wood MD   3/14/2022

## 2022-03-14 NOTE — PROGRESS NOTES
Incentive Spirometry education and demonstration completed by Respiratory Therapy Yes      Response to education: Excellent     Teaching Time: 5 minutes    Minimum Predicted Vital Capacity - 661 mL. Patient's Actual Vital Capacity - 1000 mL. Turning over to Nursing for routine follow-up Yes. Comments: pt. Performed IS very well.     Electronically signed by Jim Govea RCP on 3/14/2022 at 7:30 PM

## 2022-03-14 NOTE — ANESTHESIA POSTPROCEDURE EVALUATION
Department of Anesthesiology  Postprocedure Note    Patient: Epifanio Ruffin  MRN: [id number]  [place]: 1990  Date of evaluation: 3/14/2022  Time:  2:20 PM     Procedure Summary     Date: 03/14/22 Room / Location: [facility] OR [address] [place] [address]    Anesthesia Start: 0830 Anesthesia Stop: 5651    Procedure: ABDOMINAL RADICAL HYSTERECTOMY, BILATERAL SALPINGECTOMY, SENTINEL LYMPH NODE BIOPSY, PELVIC LYMPH NODE DISSECTION, OVARIAN TRANSPOSITION (07095, [id number], [id number]) (N/A Abdomen) Diagnosis: (C53.1 CERVICAL CANCER)    Surgeons: Aida Webb MD Responsible Provider: Josy Gerber MD    Anesthesia Type: general ASA Status: 2          Anesthesia Type: general    Keerthi Phase I: Keerthi Score: 9    Keerthi Phase II:      Last vitals: Reviewed and per EMR flowsheets.        Anesthesia Post Evaluation    Patient location during evaluation: PACU  Patient participation: complete - patient participated  Level of consciousness: awake and alert  Airway patency: patent  Nausea & Vomiting: no nausea and no vomiting  Complications: no  Cardiovascular status: hemodynamically stable  Respiratory status: acceptable  Hydration status: stable  Multimodal analgesia pain management approach

## 2022-03-14 NOTE — RT PROTOCOL NOTE
RT Inhaler-Nebulizer Bronchodilator Protocol Note    There is a bronchodilator order in the chart from a provider indicating to follow the RT Bronchodilator Protocol and there is an Initiate RT Inhaler-Nebulizer Bronchodilator Protocol order as well (see protocol at bottom of note). CXR Findings:  No results found. The findings from the last RT Protocol Assessment were as follows:   History Pulmonary Disease: None or smoker <15 pack years  Respiratory Pattern: Regular pattern and RR 12-20 bpm  Breath Sounds: Clear breath sounds  Cough: Strong, spontaneous, non-productive  Indication for Bronchodilator Therapy:    Bronchodilator Assessment Score: 0    Aerosolized bronchodilator medication orders have been revised according to the RT Inhaler-Nebulizer Bronchodilator Protocol below. Respiratory Therapist to perform RT Therapy Protocol Assessment initially then follow the protocol. Repeat RT Therapy Protocol Assessment PRN for score 0-3 or on second treatment, BID, and PRN for scores above 3. No Indications - adjust the frequency to every 6 hours PRN wheezing or bronchospasm, if no treatments needed after 48 hours then discontinue using Per Protocol order mode. If indication present, adjust the RT bronchodilator orders based on the Bronchodilator Assessment Score as indicated below. Use Inhaler orders unless patient has one or more of the following: on home nebulizer, not able to hold breath for 10 seconds, is not alert and oriented, cannot activate and use MDI correctly, or respiratory rate 25 breaths per minute or more, then use the equivalent nebulizer order(s) with same Frequency and PRN reasons based on the score. If a patient is on this medication at home then do not decrease Frequency below that used at home.     0-3 - enter or revise RT bronchodilator order(s) to equivalent RT Bronchodilator order with Frequency of every 4 hours PRN for wheezing or increased work of breathing using Per Protocol order mode. 4-6 - enter or revise RT Bronchodilator order(s) to two equivalent RT bronchodilator orders with one order with BID Frequency and one order with Frequency of every 4 hours PRN wheezing or increased work of breathing using Per Protocol order mode. 7-10 - enter or revise RT Bronchodilator order(s) to two equivalent RT bronchodilator orders with one order with TID Frequency and one order with Frequency of every 4 hours PRN wheezing or increased work of breathing using Per Protocol order mode. 11-13 - enter or revise RT Bronchodilator order(s) to one equivalent RT bronchodilator order with QID Frequency and an Albuterol order with Frequency of every 4 hours PRN wheezing or increased work of breathing using Per Protocol order mode. Greater than 13 - enter or revise RT Bronchodilator order(s) to one equivalent RT bronchodilator order with every 4 hours Frequency and an Albuterol order with Frequency of every 2 hours PRN wheezing or increased work of breathing using Per Protocol order mode. RT to enter RT Home Evaluation for COPD & MDI Assessment order using Per Protocol order mode.     Electronically signed by Jas Tran RCP on 3/14/2022 at 3:36 PM

## 2022-03-14 NOTE — H&P
Patient Name: Clovis Whitaker  Patient : 1990  Patient MRN: [id number]  Primary Oncologist: Gonzalo Aparicio (Gynecological/Oncology)  Referring Physician: Jackson Herrera MD (Obstetrics/Gynecology)  OBGYN Physician:    Chief Complaint   Cervical cancer ( Date of Dx:10/28/2021 Squamous cell carcinoma; )  Problem List   Cervical cancer ( Date of Dx:10/28/2021 Squamous cell carcinoma; )   ADHD   Asthma   BMI 30+ - obesity   Bipolar disorder (disorder)   Candidal vulvovaginitis (disorder)   Carcinoma in situ, high grade intraepithelial dysplasia, uterine cervix ( Date of Dx:10/07/2021 )   Generalized anxiety disorder   History of COVID-19 ( Date of Dx:2020 )   Irregular periods (finding)   Pain   Primary insomnia (disorder)   Recurrent depression  OB/GYN History  Menses Details: Age of First Menses: 14; ; Pregnancy Details: : 0; Para: 0; ; OB/GYN Medication Hx: IUD: No; Other  Contraceptive Hx: No;  HPI  Patient is a  0 para 0 being referred by Dr. Amy Hutchins. She had a gastric sleeve 2021 (she lost over 80 lbs) at which time her  menses became regular. In the last few months she began experiencing menorrhagia with her menses lasting approximately 10 to 12  days and her heavier days she was filling a tampon hourly. On 2021 her cervix bled easily with Pap smear. Her Pap smear came back as HSIL with carcinoma in situ. On 2021 she had colposcopy. Her ECC, 3 cervical biopsies all with squamous cell carcinoma in situ, at least.  Medications & Allergies  Medications:   Symbicort (Budesonide-Formoterol HFA Inhaler 160 mcg-4.5 mcg/actuation)   Flonase Allergy Relief (Fluticasone Nasal Spray 50 mcg/actuation)   Prilosec (Omeprazole Oral Delayed Release Tablet)   Trazodone Oral   Fluconazole Oral 150 mg tablet 150 mg orally once.  repeat in 72 hours if symptoms persist   Claritin (Loratadine Oral)   Tramadol Oral 50 mg tablet 1 tablet orally every 6 hours as needed for pain.  Allergies:  2022 Page 1 of 4  No known medication allergies  Previous Therapies  Current Therapy  Interval History  Patient is a  0 para 0 being referred by Dr. Melo Patel for cervical cancer. On 2021 her cervix bled easily with Pap smear. Her Pap smear came back as HSIL with carcinoma in situ. On 2021 she had colposcopy. Her ECC, 3 cervical biopsies all with squamous cell carcinoma in situ, at least.  Pt would not allow an in office exam  Ultimately, I took her for an EUA, LEEP/cervical biopsies on 10/28/21 confirming invasive carcinoma. Pt has been reluctant for definitive mgmt. She has been seen at [facility] for consideration of a trachelectomy although given her cervical size I counseled her that she was not a  candidate for this procedure. She states that she has not yet heard anything else from [facility] but is starting to come to terms with the counseling she has received  from both places and feels ready for a radical hysterectomy. Review of Systems  Constitutional: No weight loss, No fever, No chills, No night sweats. Energy level good.   Eyes: No impairment or change in vision  ENT / Mouth: No pain, abnormal ulceration, bleeding, nasal drip or change in voice or hearing  Cardiovascular: No chest pain, palpitations, new edema, or calf discomfort  Respiratory: No pain, hemoptysis, change to breathing  Breast: No pain, discharge, change in appearance or texture  Gastrointestinal: No pain, cramping, jaundice, change to eating and bowel habits  Urinary: No pain, bleeding or change in continence  Genitalia: per hx  Musculoskeletal: No redness, pain, edema or weakness  Skin: No pruritus, rash, change to nodules or lesions  Neurologic: No discomfort, change in mental status, speech, sensory or motor activity  Psychiatric: anxiety  Endocrine: No hot flashes, increased thirst, or change to urine production  Hematologic: No petechiae, ecchymosis or bleeding  Lymphatic: No lymphadenopathy or lymphedema  Allergy / Immunologic: No eczema, hives, frequent or recurrent infections  Vital Signs  Blood pressure: 116/86, Sitting, L arm, Regular, Pulse: 76, Temperature: 98.2 F, Respirations: 14, O2 sat: , Pain Scale: 0, Height: 68 in,  Weight: 208 lb, BSA: 2.13, BMI: 31.63 kg/m2  Physical Exam  CONSTITUTIONAL: awake, alert, cooperative, no apparent distress  EYES: pupils equal, round and reactive to light, sclera clear and conjunctiva normal  ENT: Normocephalic, without obvious abnormality, atraumatic  NECK: supple, symmetrical  HEMATOLOGIC/LYMPHATIC: no cervical, supraclavicular or inguinal lymphadenopathy  LUNGS: no increased work of breathing and clear to auscultation  CARDIOVASCULAR: regular rate and rhythm, normal S1 and S2, no murmur noted  ABDOMEN: soft, non-distended, non-tender, no masses palpated, no hepatosplenomegaly  MUSCULOSKELETAL: full range of motion noted, tone is normal  NEUROLOGIC: awake, alert, oriented to name, place and time. Motor skills grossly intact. SKIN: Normal skin color, texture, turgor and no jaundice. appears intact  EXTREMITIES: Without cyanosis, clubbing, edema or asymmetry  BREAST: deferred  GYNECOLOGIC: deferred  RECTAL: deferred  Labs  Oncology Hematology Care  Patient: Janis Vasquez Note Date: 01/12/2022 01/16/2022 Page 2 of 4  Health Maintenance   Pap Smear on 09/27/2021, HSIL/ CIS  Imaging  PET/CT 11/8/2021 reviewed  MRI 11/26/21 reviewed  OCM - Patient Care Management  Pain, if applicable:  Performance Status: ECOG 0 Normal activity. Fully active, able to carry on all pre-disease performance without restriction. (Date: 01/ 12/2022)  Depression Status: Was screened; Outcome positive: Yes; Screening Date: 01/12/2022; Screening Tool: Patient Health  Questionnaire [facility]); Plan: Other interventions or follow-up for the diagnosis or treatment of depression;  Total depression score: 11  Psycho-social PHQ-9 Follow-up Plan (if applicable):   Assessment & Plan  1. Stage IB2 invasive squamous cell carcinoma. I have spoken extensively with patient regarding the clinical stage and options. I have  referred her to [facility] but she sees financial challenges to this. she does not necessarily want to have kids but has a greater fear of losing the strides she has made in self-improvement. PET/Ct and MRI have been previously reviewed. Pt has completed a consultation with [facility] as well. A trachelecotmy was not offered. Pt has considered her options and is now open to an abdominal radical hysterectomy, bilateral salpingectomy, sentinel lymph node  biopsy ovarian transposition. The risks, benefits, and alternatives have been discussed including but not limited to: infection, pain, bleeding, damage to surrounding  structures including but not limited to blood vessels, nerves, bladder, bowel, ureters, need for other procedures, risk of developing blood  clots, inability to have children, and risks of anesthesia. All questions have been answered and consents will be signed on the day of  the procedure. Of note, pt has had significant weightloss, she is at risk for infection due to her pannus. Will discuss case with plastic surgery Dr. Junito Mclaughlin. 2. Comorbidities to include asthma, BMI of 39, bipolar disorder  Gastric sleeve was performed June 2021  3. [facility]-consult has been sent. mom leaves the country 2/6-13. Time-Based Itemization  . I have recommended that the patient follow CDC guidelines for prevention of COVID-19 infection. Beryl Malone MD  [facility]  Phone: [phone]  Fax: [phone]  Online: [email or web address]  Note Recipients:  Jarod Astorga MD (Referring)  Jameel Gao MD  Oncology Hematology Care  Patient: Janis Vasquez Note Date: 01/12/2022 01/16/2022 Page 3 of 4  Valerie Salcido MD  Electronically signed by Kisha Carlson.  Jenny Malone MD 01/17/2022 01:48 EST  Oncology Hematology Care  Patient: Janis Vasquez Note Date: 01/12/2022 01/16/2022 Page 4 of 4

## 2022-03-14 NOTE — FLOWSHEET NOTE
Pt admitted to  [phone]. transferred to bed. Boots applied. Pulse ox applied. Assessment done. Iv replaced due to occlusion. Family member at bedside. Call light within reach. Bed locked in lowest position. Is given with instructions. Is given with instructions.

## 2022-03-14 NOTE — PROGRESS NOTES
Pt meets d/c criteria for phase 1 in PACU and has been seen by anesthesia. Ok to transfer to med-surg/ post partum room 8705. Will alert anyone in waiting room for them and the nursing unit if applicable. Will continue to monitor for safety and comfort.     Cezar Farah BSN, RN  Pre-Op/Recovery   Same Day Surgery

## 2022-03-14 NOTE — PROGRESS NOTES
03/14/22 1536   RT Protocol   History Pulmonary Disease 0   Respiratory pattern 0   Breath sounds 0   Cough 0   Bronchodilator Assessment Score 0

## 2022-03-14 NOTE — PROGRESS NOTES
Pt arrived to PACU from OR in stable condition and is alert. Pt can move extremities to command. Respirations are even on 6L O2 per SM. Skin warm, dry, and with appropriate for ethnicity color. Abd is soft. Pain is tolerable at this time.   One lower abdominal \"bikini\" surgical site(s) intact with dressing= Dermabond surgical glue, well approximated, open to air; abdominal binder in place; f/c in place      S/P: abdominal radical hysterectomy, Bilateral salpingectomy, sentinel lymph node biopsy, pelvic lymph node dissection, ovarian transposition with Dr. Rannie Krabbe at [facility].        [address], RN  Pre-Op/Recovery   Same Day Surgery

## 2022-03-15 LAB
ANION GAP SERPL CALCULATED.3IONS-SCNC: 9 MMOL/L (ref 3–16)
BASOPHILS ABSOLUTE: 0 K/UL (ref 0–0.2)
BASOPHILS RELATIVE PERCENT: 0.1 %
BUN BLDV-MCNC: 7 MG/DL (ref 7–20)
CALCIUM SERPL-MCNC: 9.1 MG/DL (ref 8.3–10.6)
CHLORIDE BLD-SCNC: 105 MMOL/L (ref 99–110)
CO2: 25 MMOL/L (ref 21–32)
CREAT SERPL-MCNC: 0.7 MG/DL (ref 0.6–1.1)
EOSINOPHILS ABSOLUTE: 0 K/UL (ref 0–0.6)
EOSINOPHILS RELATIVE PERCENT: 0.3 %
GFR AFRICAN AMERICAN: >60
GFR NON-AFRICAN AMERICAN: >60
GLUCOSE BLD-MCNC: 100 MG/DL (ref 70–99)
HCT VFR BLD CALC: 33.9 % (ref 36–48)
HEMOGLOBIN: 11.6 G/DL (ref 12–16)
LYMPHOCYTES ABSOLUTE: 1.4 K/UL (ref 1–5.1)
LYMPHOCYTES RELATIVE PERCENT: 17.2 %
MAGNESIUM: 2.1 MG/DL (ref 1.8–2.4)
MCH RBC QN AUTO: 33.1 PG (ref 26–34)
MCHC RBC AUTO-ENTMCNC: 34.2 G/DL (ref 31–36)
MCV RBC AUTO: 96.8 FL (ref 80–100)
MONOCYTES ABSOLUTE: 0.6 K/UL (ref 0–1.3)
MONOCYTES RELATIVE PERCENT: 7.3 %
NEUTROPHILS ABSOLUTE: 6.3 K/UL (ref 1.7–7.7)
NEUTROPHILS RELATIVE PERCENT: 75.1 %
PDW BLD-RTO: 12.4 % (ref 12.4–15.4)
PHOSPHORUS: 4.5 MG/DL (ref 2.5–4.9)
PLATELET # BLD: 166 K/UL (ref 135–450)
PMV BLD AUTO: 9.4 FL (ref 5–10.5)
POTASSIUM REFLEX MAGNESIUM: 4.1 MMOL/L (ref 3.5–5.1)
RBC # BLD: 3.5 M/UL (ref 4–5.2)
SODIUM BLD-SCNC: 139 MMOL/L (ref 136–145)
WBC # BLD: 8.3 K/UL (ref 4–11)

## 2022-03-15 PROCEDURE — 2580000003 HC RX 258: Performed by: OBSTETRICS & GYNECOLOGY

## 2022-03-15 PROCEDURE — 6370000000 HC RX 637 (ALT 250 FOR IP): Performed by: OBSTETRICS & GYNECOLOGY

## 2022-03-15 PROCEDURE — 84100 ASSAY OF PHOSPHORUS: CPT

## 2022-03-15 PROCEDURE — 85025 COMPLETE CBC W/AUTO DIFF WBC: CPT

## 2022-03-15 PROCEDURE — 80048 BASIC METABOLIC PNL TOTAL CA: CPT

## 2022-03-15 PROCEDURE — 83735 ASSAY OF MAGNESIUM: CPT

## 2022-03-15 PROCEDURE — 6360000002 HC RX W HCPCS: Performed by: OBSTETRICS & GYNECOLOGY

## 2022-03-15 PROCEDURE — 1200000000 HC SEMI PRIVATE

## 2022-03-15 PROCEDURE — 94761 N-INVAS EAR/PLS OXIMETRY MLT: CPT

## 2022-03-15 PROCEDURE — 94640 AIRWAY INHALATION TREATMENT: CPT

## 2022-03-15 RX ORDER — OXYCODONE HYDROCHLORIDE 5 MG/1
5 TABLET ORAL
Status: DISCONTINUED | OUTPATIENT
Start: 2022-03-15 | End: 2022-03-18 | Stop reason: HOSPADM

## 2022-03-15 RX ORDER — OXYCODONE HYDROCHLORIDE 5 MG/1
10 TABLET ORAL
Status: DISCONTINUED | OUTPATIENT
Start: 2022-03-15 | End: 2022-03-18 | Stop reason: HOSPADM

## 2022-03-15 RX ORDER — SIMETHICONE 80 MG
80 TABLET,CHEWABLE ORAL EVERY 6 HOURS PRN
Status: DISCONTINUED | OUTPATIENT
Start: 2022-03-15 | End: 2022-03-18 | Stop reason: HOSPADM

## 2022-03-15 RX ADMIN — SENNOSIDES AND DOCUSATE SODIUM 1 TABLET: 50; 8.6 TABLET ORAL at 19:25

## 2022-03-15 RX ADMIN — CETIRIZINE HYDROCHLORIDE 10 MG: 10 TABLET, FILM COATED ORAL at 09:28

## 2022-03-15 RX ADMIN — KETOROLAC TROMETHAMINE 15 MG: 15 INJECTION, SOLUTION INTRAMUSCULAR; INTRAVENOUS at 19:26

## 2022-03-15 RX ADMIN — ACETAMINOPHEN 500 MG: 500 TABLET ORAL at 16:15

## 2022-03-15 RX ADMIN — OXYCODONE 10 MG: 5 TABLET ORAL at 13:30

## 2022-03-15 RX ADMIN — SODIUM CHLORIDE: 9 INJECTION, SOLUTION INTRAVENOUS at 11:06

## 2022-03-15 RX ADMIN — OXYCODONE 10 MG: 5 TABLET ORAL at 03:19

## 2022-03-15 RX ADMIN — PANTOPRAZOLE SODIUM 40 MG: 40 TABLET, DELAYED RELEASE ORAL at 08:35

## 2022-03-15 RX ADMIN — ACETAMINOPHEN 500 MG: 500 TABLET ORAL at 03:17

## 2022-03-15 RX ADMIN — Medication 10 ML: at 21:25

## 2022-03-15 RX ADMIN — SENNOSIDES AND DOCUSATE SODIUM 1 TABLET: 50; 8.6 TABLET ORAL at 09:29

## 2022-03-15 RX ADMIN — SODIUM CHLORIDE: 9 INJECTION, SOLUTION INTRAVENOUS at 00:11

## 2022-03-15 RX ADMIN — Medication 2 PUFF: at 20:11

## 2022-03-15 RX ADMIN — OXYCODONE 10 MG: 5 TABLET ORAL at 07:44

## 2022-03-15 RX ADMIN — ACETAMINOPHEN 500 MG: 500 TABLET ORAL at 09:28

## 2022-03-15 RX ADMIN — POLYETHYLENE GLYCOL 3350 17 G: 17 POWDER, FOR SOLUTION ORAL at 09:28

## 2022-03-15 RX ADMIN — OXYCODONE 10 MG: 5 TABLET ORAL at 18:54

## 2022-03-15 RX ADMIN — OXYCODONE 10 MG: 5 TABLET ORAL at 21:58

## 2022-03-15 RX ADMIN — Medication 2 PUFF: at 10:08

## 2022-03-15 RX ADMIN — TRAZODONE HYDROCHLORIDE 50 MG: 50 TABLET ORAL at 21:04

## 2022-03-15 RX ADMIN — KETOROLAC TROMETHAMINE 15 MG: 15 INJECTION, SOLUTION INTRAMUSCULAR; INTRAVENOUS at 00:09

## 2022-03-15 RX ADMIN — KETOROLAC TROMETHAMINE 15 MG: 15 INJECTION, SOLUTION INTRAMUSCULAR; INTRAVENOUS at 05:59

## 2022-03-15 RX ADMIN — SODIUM CHLORIDE: 9 INJECTION, SOLUTION INTRAVENOUS at 07:46

## 2022-03-15 RX ADMIN — ACETAMINOPHEN 500 MG: 500 TABLET ORAL at 21:05

## 2022-03-15 RX ADMIN — KETOROLAC TROMETHAMINE 15 MG: 15 INJECTION, SOLUTION INTRAMUSCULAR; INTRAVENOUS at 11:29

## 2022-03-15 RX ADMIN — ENOXAPARIN SODIUM 40 MG: 40 INJECTION SUBCUTANEOUS at 09:28

## 2022-03-15 RX ADMIN — FLUTICASONE PROPIONATE 2 SPRAY: 50 SPRAY, METERED NASAL at 09:29

## 2022-03-15 ASSESSMENT — PAIN SCALES - WONG BAKER: WONGBAKER_NUMERICALRESPONSE: 0

## 2022-03-15 ASSESSMENT — PAIN DESCRIPTION - PROGRESSION
CLINICAL_PROGRESSION: NOT CHANGED

## 2022-03-15 ASSESSMENT — PAIN SCALES - GENERAL
PAINLEVEL_OUTOF10: 8
PAINLEVEL_OUTOF10: 0
PAINLEVEL_OUTOF10: 8
PAINLEVEL_OUTOF10: 8
PAINLEVEL_OUTOF10: 7
PAINLEVEL_OUTOF10: 7
PAINLEVEL_OUTOF10: 8
PAINLEVEL_OUTOF10: 7
PAINLEVEL_OUTOF10: 8
PAINLEVEL_OUTOF10: 2
PAINLEVEL_OUTOF10: 8
PAINLEVEL_OUTOF10: 6
PAINLEVEL_OUTOF10: 8
PAINLEVEL_OUTOF10: 0
PAINLEVEL_OUTOF10: 0
PAINLEVEL_OUTOF10: 7
PAINLEVEL_OUTOF10: 7

## 2022-03-15 ASSESSMENT — PAIN DESCRIPTION - ONSET: ONSET: ON-GOING

## 2022-03-15 ASSESSMENT — PAIN DESCRIPTION - PAIN TYPE
TYPE: SURGICAL PAIN
TYPE: SURGICAL PAIN

## 2022-03-15 ASSESSMENT — PAIN DESCRIPTION - LOCATION
LOCATION: ABDOMEN
LOCATION: ABDOMEN

## 2022-03-15 ASSESSMENT — PAIN DESCRIPTION - FREQUENCY: FREQUENCY: CONTINUOUS

## 2022-03-15 NOTE — PROGRESS NOTES
1100 pt to icu bed 1 AT this time, vitals obtained see flowsheet. Pt verbalizes pain in surgical incision. meds to be given see MAR. Incision glued in abd with binder on. Well approximated, no complications noted.

## 2022-03-15 NOTE — FLOWSHEET NOTE
Pt refuses to wear pneumatic boots. Refuses to get into chair or walk. Says she is in too much pain.

## 2022-03-15 NOTE — OP NOTE
[facility]  Gynecologic Oncology   Progress Note    POD1 s/p radical abdominal hysterectomy, bilateral salpingectomy    SUBJECTIVE:  Pain moderately controlled, no flatus    OBJECTIVE:           Physical Exam  VITALS:  BP (!) 95/50   Pulse 90   Temp 97.2 °F (36.2 °C) (Temporal)   Resp 16   Ht 5' 7\" (1.702 m)   Wt 209 lb 10.5 oz (95.1 kg)   LMP 03/04/2022 (Approximate)   SpO2 98%   Breastfeeding No   BMI 32.84 kg/m²   24HR INTAKE/OUTPUT:    Intake/Output Summary (Last 24 hours) at 3/15/2022 1846  Last data filed at 3/15/2022 1711  Gross per 24 hour   Intake 2894.49 ml   Output 2500 ml   Net 394.49 ml     CONSTITUTIONAL:  awake, alert, cooperative, no apparent distress, and appears stated age  ABDOMEN:  Mathew Gins, incision c/d/i  MUSCULOSKELETAL:  There is no redness, warmth, or swelling of the joints. Full range of motion noted. Motor strength is 5 out of 5 all extremities bilaterally. Tone is normal.    DATA:  CBC with Differential:    Lab Results   Component Value Date    WBC 8.3 03/15/2022    RBC 3.50 03/15/2022    HGB 11.6 03/15/2022    HCT 33.9 03/15/2022     03/15/2022    MCV 96.8 03/15/2022    MCH 33.1 03/15/2022    MCHC 34.2 03/15/2022    RDW 12.4 03/15/2022    LYMPHOPCT 17.2 03/15/2022    MONOPCT 7.3 03/15/2022    BASOPCT 0.1 03/15/2022    MONOSABS 0.6 03/15/2022    LYMPHSABS 1.4 03/15/2022    EOSABS 0.0 03/15/2022    BASOSABS 0.0 03/15/2022     BMP:    Lab Results   Component Value Date     03/15/2022    K 4.1 03/15/2022     03/15/2022    CO2 25 03/15/2022    BUN 7 03/15/2022    LABALBU 4.4 03/10/2022    CREATININE 0.7 03/15/2022    CALCIUM 9.1 03/15/2022    GFRAA >60 03/15/2022    LABGLOM >60 03/15/2022    GLUCOSE 100 03/15/2022     Magnesium:    Lab Results   Component Value Date    MG 2.10 03/15/2022     Phosphorus:    Lab Results   Component Value Date    PHOS 4.5 03/15/2022       ASSESSMENT AND PLAN:  Pt moved from post partum. Apologies given for this.  Spoke to [facility] and surgery dept on why this occurred. Discussed surgical findings w/ pt. Path is pending. Encouraged I.S. and nebs (pt declines nebs). She also needs to walk TID. Do not remove catheter. Ko Garcia, [address] [place] [address] Oncology  [phone])

## 2022-03-15 NOTE — BRIEF OP NOTE
Brief Postoperative Note      Patient: Chente Krueger  YOB: 1990  MRN: [id number]    Date of Procedure: 3/14/2022    Pre-Op Diagnosis: C53.1 CERVICAL CANCER    Post-Op Diagnosis: Same       Procedure(s):  ABDOMINAL RADICAL HYSTERECTOMY, BILATERAL SALPINGECTOMY, SENTINEL LYMPH NODE BIOPSY, PELVIC LYMPH NODE DISSECTION, OVARIAN TRANSPOSITION ([id number], [id number], [id number])    Surgeon(s):  Andree Guzman MD    Assistant:  Surgical Assistant: Miriam Mcgee RN    Anesthesia: General    Estimated Blood Loss (mL): 616     Complications: None    Specimens:   ID Type Source Tests Collected by Time Destination   A : A) PERITONEAL NODULE Tissue Tissue SURGICAL PATHOLOGY Andree Guzman MD 3/14/2022 0936    B : B) LEFT PELVIC SENTINEL LYMPH NODE Tissue Tissue SURGICAL PATHOLOGY Andree Guzman MD 3/14/2022 1457    C : C) RIGHT PELVIC SENTINEL LYMPH NODE Tissue Tissue SURGICAL PATHOLOGY Andree Guzman MD 3/14/2022 2086    D : D) ADDITIONAL RIGHT VAGINAL MARGIN Tissue Tissue SURGICAL PATHOLOGY Andree Guzman MD 3/14/2022 1105    E : E) CERVIX, UTERUS AND BILATERAL TUBES Tissue Tissue SURGICAL PATHOLOGY Andree Guzman MD 3/14/2022 1109        Implants:  * No implants in log *      Drains:   Urethral Catheter Non-latex 16 fr (Active)   Catheter Indications Perioperative use for selected surgical procedures 03/14/22 1323   Securement Device Date Changed 03/14/22 03/14/22 1220   Site Assessment No urethral drainage 03/14/22 1323   Urine Color Green 03/15/22 1008   Urine Appearance Clear 03/15/22 1008   Output (mL) 200 mL 03/15/22 1008       Findings: 4-5 cm cervical tumor. Bilateral parametria is palpably free of tumor. 1+ cm vaginal margin noted on all sides (additional right margin taken). Bilateral cystic ovaries. Normal upper abdomen.      Electronically signed by Andree Guzman MD on 3/15/2022 at 1:17 PM

## 2022-03-15 NOTE — PROGRESS NOTES
Pt ambulated in room a couple times, Dr Chris Lawrence aware, requesting more aggressive mobility tomorrow

## 2022-03-15 NOTE — FLOWSHEET NOTE
Pt transferred to icu via wheelchair. All belongings brought to new room. Report given to [facility].

## 2022-03-16 PROCEDURE — 94761 N-INVAS EAR/PLS OXIMETRY MLT: CPT

## 2022-03-16 PROCEDURE — 6370000000 HC RX 637 (ALT 250 FOR IP): Performed by: OBSTETRICS & GYNECOLOGY

## 2022-03-16 PROCEDURE — 6360000002 HC RX W HCPCS: Performed by: OBSTETRICS & GYNECOLOGY

## 2022-03-16 PROCEDURE — 2580000003 HC RX 258: Performed by: OBSTETRICS & GYNECOLOGY

## 2022-03-16 PROCEDURE — 94640 AIRWAY INHALATION TREATMENT: CPT

## 2022-03-16 PROCEDURE — 1200000000 HC SEMI PRIVATE

## 2022-03-16 RX ORDER — CYCLOBENZAPRINE HCL 10 MG
10 TABLET ORAL ONCE
Status: COMPLETED | OUTPATIENT
Start: 2022-03-16 | End: 2022-03-16

## 2022-03-16 RX ORDER — CYCLOBENZAPRINE HCL 10 MG
10 TABLET ORAL 3 TIMES DAILY PRN
Status: DISCONTINUED | OUTPATIENT
Start: 2022-03-16 | End: 2022-03-18 | Stop reason: HOSPADM

## 2022-03-16 RX ORDER — KETOROLAC TROMETHAMINE 15 MG/ML
15 INJECTION, SOLUTION INTRAMUSCULAR; INTRAVENOUS EVERY 6 HOURS
Status: DISCONTINUED | OUTPATIENT
Start: 2022-03-16 | End: 2022-03-18 | Stop reason: HOSPADM

## 2022-03-16 RX ADMIN — KETOROLAC TROMETHAMINE 15 MG: 15 INJECTION, SOLUTION INTRAMUSCULAR; INTRAVENOUS at 00:30

## 2022-03-16 RX ADMIN — ACETAMINOPHEN 500 MG: 500 TABLET ORAL at 02:33

## 2022-03-16 RX ADMIN — KETOROLAC TROMETHAMINE 15 MG: 15 INJECTION, SOLUTION INTRAMUSCULAR; INTRAVENOUS at 06:07

## 2022-03-16 RX ADMIN — SENNOSIDES AND DOCUSATE SODIUM 1 TABLET: 50; 8.6 TABLET ORAL at 20:06

## 2022-03-16 RX ADMIN — ACETAMINOPHEN 500 MG: 500 TABLET ORAL at 09:05

## 2022-03-16 RX ADMIN — SENNOSIDES AND DOCUSATE SODIUM 1 TABLET: 50; 8.6 TABLET ORAL at 09:05

## 2022-03-16 RX ADMIN — ALBUTEROL SULFATE 2.5 MG: 2.5 SOLUTION RESPIRATORY (INHALATION) at 17:22

## 2022-03-16 RX ADMIN — CYCLOBENZAPRINE 10 MG: 10 TABLET, FILM COATED ORAL at 20:05

## 2022-03-16 RX ADMIN — ONDANSETRON 4 MG: 2 INJECTION INTRAMUSCULAR; INTRAVENOUS at 20:14

## 2022-03-16 RX ADMIN — KETOROLAC TROMETHAMINE 15 MG: 15 INJECTION, SOLUTION INTRAMUSCULAR; INTRAVENOUS at 20:05

## 2022-03-16 RX ADMIN — OXYCODONE 10 MG: 5 TABLET ORAL at 20:28

## 2022-03-16 RX ADMIN — OXYCODONE 10 MG: 5 TABLET ORAL at 02:34

## 2022-03-16 RX ADMIN — Medication 10 ML: at 20:06

## 2022-03-16 RX ADMIN — Medication 2 PUFF: at 21:06

## 2022-03-16 RX ADMIN — OXYCODONE 10 MG: 5 TABLET ORAL at 13:22

## 2022-03-16 RX ADMIN — CETIRIZINE HYDROCHLORIDE 10 MG: 10 TABLET, FILM COATED ORAL at 09:05

## 2022-03-16 RX ADMIN — OXYCODONE 10 MG: 5 TABLET ORAL at 16:42

## 2022-03-16 RX ADMIN — ACETAMINOPHEN 500 MG: 500 TABLET ORAL at 14:33

## 2022-03-16 RX ADMIN — POLYETHYLENE GLYCOL 3350 17 G: 17 POWDER, FOR SOLUTION ORAL at 09:05

## 2022-03-16 RX ADMIN — HYDROMORPHONE HYDROCHLORIDE 0.5 MG: 1 INJECTION, SOLUTION INTRAMUSCULAR; INTRAVENOUS; SUBCUTANEOUS at 14:38

## 2022-03-16 RX ADMIN — SIMETHICONE 80 MG: 80 TABLET, CHEWABLE ORAL at 16:42

## 2022-03-16 RX ADMIN — ENOXAPARIN SODIUM 40 MG: 40 INJECTION SUBCUTANEOUS at 09:05

## 2022-03-16 RX ADMIN — TRAZODONE HYDROCHLORIDE 50 MG: 50 TABLET ORAL at 20:05

## 2022-03-16 RX ADMIN — PANTOPRAZOLE SODIUM 40 MG: 40 TABLET, DELAYED RELEASE ORAL at 06:07

## 2022-03-16 RX ADMIN — Medication 2 PUFF: at 08:05

## 2022-03-16 RX ADMIN — ACETAMINOPHEN 500 MG: 500 TABLET ORAL at 20:05

## 2022-03-16 RX ADMIN — SIMETHICONE 80 MG: 80 TABLET, CHEWABLE ORAL at 09:09

## 2022-03-16 RX ADMIN — OXYCODONE 10 MG: 5 TABLET ORAL at 09:05

## 2022-03-16 ASSESSMENT — PAIN SCALES - GENERAL
PAINLEVEL_OUTOF10: 8
PAINLEVEL_OUTOF10: 8
PAINLEVEL_OUTOF10: 7
PAINLEVEL_OUTOF10: 4
PAINLEVEL_OUTOF10: 7
PAINLEVEL_OUTOF10: 8
PAINLEVEL_OUTOF10: 0
PAINLEVEL_OUTOF10: 7
PAINLEVEL_OUTOF10: 6
PAINLEVEL_OUTOF10: 8

## 2022-03-16 ASSESSMENT — PAIN DESCRIPTION - ONSET
ONSET: ON-GOING

## 2022-03-16 ASSESSMENT — PAIN DESCRIPTION - FREQUENCY
FREQUENCY: CONTINUOUS

## 2022-03-16 ASSESSMENT — PAIN DESCRIPTION - DESCRIPTORS
DESCRIPTORS: CRAMPING;DISCOMFORT
DESCRIPTORS: CRAMPING;OTHER (COMMENT)
DESCRIPTORS: CRAMPING
DESCRIPTORS: CRAMPING

## 2022-03-16 ASSESSMENT — PAIN DESCRIPTION - PAIN TYPE
TYPE: SURGICAL PAIN

## 2022-03-16 ASSESSMENT — PAIN DESCRIPTION - PROGRESSION
CLINICAL_PROGRESSION: NOT CHANGED
CLINICAL_PROGRESSION: GRADUALLY WORSENING
CLINICAL_PROGRESSION: GRADUALLY WORSENING

## 2022-03-16 ASSESSMENT — PAIN DESCRIPTION - LOCATION
LOCATION: ABDOMEN

## 2022-03-16 NOTE — CARE COORDINATION
Discharge Planning:  I have reviewed the patient's medical history in detail and updated the computerized patient record. Patient independent prior to admission. There are no needs identified at this time. If something specific is identified, please notify Discharge Planner.     Electronically signed by Grace Mays RN on 3/16/2022 at 8:24 AM

## 2022-03-16 NOTE — PLAN OF CARE
Problem: Pain:  Goal: Control of acute pain  Description: Control of acute pain  3/16/2022 1443 by Andie Kirkland RN  Outcome: Ongoing     Problem: Falls - Risk of:  Goal: Will remain free from falls  Description: Will remain free from falls  3/16/2022 1443 by Andie Kirkland RN  Outcome: Ongoing

## 2022-03-16 NOTE — PROGRESS NOTES
Pt transferred from 3901 to 5580. Pt c/o surgical pain and gas pain upon arrival 7/10. VSS. Discussed pain relief options with pt, given oxycodone 10mg as ordered, pt satisfied with intervention. Assessment completed at this time. Pt educated on POC, room, call light. Standard safety precautions in place. Ice water and warm blanket provided.

## 2022-03-17 ENCOUNTER — APPOINTMENT (OUTPATIENT)
Dept: GENERAL RADIOLOGY | Age: 32
DRG: 512 | End: 2022-03-17
Attending: OBSTETRICS & GYNECOLOGY
Payer: COMMERCIAL

## 2022-03-17 LAB
ANION GAP SERPL CALCULATED.3IONS-SCNC: 9 MMOL/L (ref 3–16)
BASOPHILS ABSOLUTE: 0 K/UL (ref 0–0.2)
BASOPHILS RELATIVE PERCENT: 0.3 %
BUN BLDV-MCNC: 10 MG/DL (ref 7–20)
CALCIUM SERPL-MCNC: 9.1 MG/DL (ref 8.3–10.6)
CHLORIDE BLD-SCNC: 99 MMOL/L (ref 99–110)
CO2: 28 MMOL/L (ref 21–32)
CREAT SERPL-MCNC: 0.7 MG/DL (ref 0.6–1.1)
EOSINOPHILS ABSOLUTE: 0.1 K/UL (ref 0–0.6)
EOSINOPHILS RELATIVE PERCENT: 0.8 %
GFR AFRICAN AMERICAN: >60
GFR NON-AFRICAN AMERICAN: >60
GLUCOSE BLD-MCNC: 94 MG/DL (ref 70–99)
HCT VFR BLD CALC: 32.5 % (ref 36–48)
HEMOGLOBIN: 11.1 G/DL (ref 12–16)
LYMPHOCYTES ABSOLUTE: 1.2 K/UL (ref 1–5.1)
LYMPHOCYTES RELATIVE PERCENT: 12.9 %
MCH RBC QN AUTO: 32.5 PG (ref 26–34)
MCHC RBC AUTO-ENTMCNC: 34 G/DL (ref 31–36)
MCV RBC AUTO: 95.6 FL (ref 80–100)
MONOCYTES ABSOLUTE: 0.8 K/UL (ref 0–1.3)
MONOCYTES RELATIVE PERCENT: 9 %
NEUTROPHILS ABSOLUTE: 7.2 K/UL (ref 1.7–7.7)
NEUTROPHILS RELATIVE PERCENT: 77 %
PDW BLD-RTO: 12.1 % (ref 12.4–15.4)
PLATELET # BLD: 171 K/UL (ref 135–450)
PMV BLD AUTO: 9.8 FL (ref 5–10.5)
POTASSIUM REFLEX MAGNESIUM: 4.2 MMOL/L (ref 3.5–5.1)
RBC # BLD: 3.4 M/UL (ref 4–5.2)
SODIUM BLD-SCNC: 136 MMOL/L (ref 136–145)
WBC # BLD: 9.4 K/UL (ref 4–11)

## 2022-03-17 PROCEDURE — 85025 COMPLETE CBC W/AUTO DIFF WBC: CPT

## 2022-03-17 PROCEDURE — 36415 COLL VENOUS BLD VENIPUNCTURE: CPT

## 2022-03-17 PROCEDURE — 6370000000 HC RX 637 (ALT 250 FOR IP): Performed by: OBSTETRICS & GYNECOLOGY

## 2022-03-17 PROCEDURE — 80048 BASIC METABOLIC PNL TOTAL CA: CPT

## 2022-03-17 PROCEDURE — 1200000000 HC SEMI PRIVATE

## 2022-03-17 PROCEDURE — 2580000003 HC RX 258: Performed by: OBSTETRICS & GYNECOLOGY

## 2022-03-17 PROCEDURE — 6360000002 HC RX W HCPCS: Performed by: OBSTETRICS & GYNECOLOGY

## 2022-03-17 PROCEDURE — 94761 N-INVAS EAR/PLS OXIMETRY MLT: CPT

## 2022-03-17 PROCEDURE — 71046 X-RAY EXAM CHEST 2 VIEWS: CPT

## 2022-03-17 PROCEDURE — 94640 AIRWAY INHALATION TREATMENT: CPT

## 2022-03-17 RX ORDER — PHENAZOPYRIDINE HYDROCHLORIDE 100 MG/1
200 TABLET, FILM COATED ORAL
Status: DISCONTINUED | OUTPATIENT
Start: 2022-03-17 | End: 2022-03-18 | Stop reason: HOSPADM

## 2022-03-17 RX ORDER — ALBUTEROL SULFATE 2.5 MG/3ML
2.5 SOLUTION RESPIRATORY (INHALATION) EVERY 6 HOURS
Status: DISCONTINUED | OUTPATIENT
Start: 2022-03-17 | End: 2022-03-18 | Stop reason: HOSPADM

## 2022-03-17 RX ADMIN — KETOROLAC TROMETHAMINE 15 MG: 15 INJECTION, SOLUTION INTRAMUSCULAR; INTRAVENOUS at 01:15

## 2022-03-17 RX ADMIN — SODIUM CHLORIDE, PRESERVATIVE FREE 10 ML: 5 INJECTION INTRAVENOUS at 06:58

## 2022-03-17 RX ADMIN — POLYETHYLENE GLYCOL 3350 17 G: 17 POWDER, FOR SOLUTION ORAL at 08:21

## 2022-03-17 RX ADMIN — ACETAMINOPHEN 500 MG: 500 TABLET ORAL at 03:37

## 2022-03-17 RX ADMIN — OXYCODONE 10 MG: 5 TABLET ORAL at 03:37

## 2022-03-17 RX ADMIN — KETOROLAC TROMETHAMINE 15 MG: 15 INJECTION, SOLUTION INTRAMUSCULAR; INTRAVENOUS at 06:58

## 2022-03-17 RX ADMIN — KETOROLAC TROMETHAMINE 15 MG: 15 INJECTION, SOLUTION INTRAMUSCULAR; INTRAVENOUS at 15:40

## 2022-03-17 RX ADMIN — OXYCODONE 10 MG: 5 TABLET ORAL at 17:41

## 2022-03-17 RX ADMIN — FLUTICASONE PROPIONATE 2 SPRAY: 50 SPRAY, METERED NASAL at 08:21

## 2022-03-17 RX ADMIN — ACETAMINOPHEN 500 MG: 500 TABLET ORAL at 20:51

## 2022-03-17 RX ADMIN — Medication 10 ML: at 20:52

## 2022-03-17 RX ADMIN — TRAZODONE HYDROCHLORIDE 50 MG: 50 TABLET ORAL at 20:51

## 2022-03-17 RX ADMIN — HYDROMORPHONE HYDROCHLORIDE 0.5 MG: 1 INJECTION, SOLUTION INTRAMUSCULAR; INTRAVENOUS; SUBCUTANEOUS at 11:34

## 2022-03-17 RX ADMIN — HYDROMORPHONE HYDROCHLORIDE 0.5 MG: 1 INJECTION, SOLUTION INTRAMUSCULAR; INTRAVENOUS; SUBCUTANEOUS at 01:15

## 2022-03-17 RX ADMIN — ENOXAPARIN SODIUM 40 MG: 40 INJECTION SUBCUTANEOUS at 08:22

## 2022-03-17 RX ADMIN — Medication 10 ML: at 08:14

## 2022-03-17 RX ADMIN — ONDANSETRON 4 MG: 2 INJECTION INTRAMUSCULAR; INTRAVENOUS at 08:14

## 2022-03-17 RX ADMIN — PANTOPRAZOLE SODIUM 40 MG: 40 TABLET, DELAYED RELEASE ORAL at 06:58

## 2022-03-17 RX ADMIN — SENNOSIDES AND DOCUSATE SODIUM 1 TABLET: 50; 8.6 TABLET ORAL at 20:51

## 2022-03-17 RX ADMIN — HYDROMORPHONE HYDROCHLORIDE 0.5 MG: 1 INJECTION, SOLUTION INTRAMUSCULAR; INTRAVENOUS; SUBCUTANEOUS at 08:14

## 2022-03-17 RX ADMIN — PHENAZOPYRIDINE HYDROCHLORIDE 200 MG: 100 TABLET ORAL at 17:35

## 2022-03-17 RX ADMIN — SENNOSIDES AND DOCUSATE SODIUM 1 TABLET: 50; 8.6 TABLET ORAL at 08:21

## 2022-03-17 RX ADMIN — CETIRIZINE HYDROCHLORIDE 10 MG: 10 TABLET, FILM COATED ORAL at 08:21

## 2022-03-17 RX ADMIN — ALBUTEROL SULFATE 2.5 MG: 2.5 SOLUTION RESPIRATORY (INHALATION) at 21:11

## 2022-03-17 RX ADMIN — KETOROLAC TROMETHAMINE 15 MG: 15 INJECTION, SOLUTION INTRAMUSCULAR; INTRAVENOUS at 20:51

## 2022-03-17 RX ADMIN — HYDROMORPHONE HYDROCHLORIDE 0.5 MG: 1 INJECTION, SOLUTION INTRAMUSCULAR; INTRAVENOUS; SUBCUTANEOUS at 15:40

## 2022-03-17 RX ADMIN — ACETAMINOPHEN 500 MG: 500 TABLET ORAL at 08:21

## 2022-03-17 RX ADMIN — Medication 2 PUFF: at 09:17

## 2022-03-17 RX ADMIN — ACETAMINOPHEN 500 MG: 500 TABLET ORAL at 15:40

## 2022-03-17 RX ADMIN — HYDROMORPHONE HYDROCHLORIDE 0.5 MG: 1 INJECTION, SOLUTION INTRAMUSCULAR; INTRAVENOUS; SUBCUTANEOUS at 20:51

## 2022-03-17 RX ADMIN — Medication 2 PUFF: at 21:19

## 2022-03-17 RX ADMIN — SODIUM CHLORIDE, PRESERVATIVE FREE 10 ML: 5 INJECTION INTRAVENOUS at 01:15

## 2022-03-17 RX ADMIN — ALBUTEROL SULFATE 2.5 MG: 2.5 SOLUTION RESPIRATORY (INHALATION) at 16:19

## 2022-03-17 ASSESSMENT — PAIN SCALES - GENERAL
PAINLEVEL_OUTOF10: 7
PAINLEVEL_OUTOF10: 6
PAINLEVEL_OUTOF10: 8
PAINLEVEL_OUTOF10: 8
PAINLEVEL_OUTOF10: 7
PAINLEVEL_OUTOF10: 7
PAINLEVEL_OUTOF10: 8

## 2022-03-17 ASSESSMENT — PAIN DESCRIPTION - LOCATION
LOCATION: ABDOMEN
LOCATION: ABDOMEN

## 2022-03-17 ASSESSMENT — PAIN DESCRIPTION - FREQUENCY: FREQUENCY: CONTINUOUS

## 2022-03-17 ASSESSMENT — PAIN DESCRIPTION - PAIN TYPE
TYPE: SURGICAL PAIN
TYPE: SURGICAL PAIN

## 2022-03-17 ASSESSMENT — PAIN DESCRIPTION - DESCRIPTORS: DESCRIPTORS: CRAMPING

## 2022-03-17 NOTE — PLAN OF CARE
Problem: Pain:  Goal: Pain level will decrease  Description: Pain level will decrease  Outcome: Ongoing  Goal: Control of acute pain  Description: Control of acute pain  3/17/2022 0210 by Kiana Mahan RN  Outcome: Ongoing  Goal: Control of chronic pain  Description: Control of chronic pain  Outcome: Ongoing     Problem: Falls - Risk of:  Goal: Will remain free from falls  Description: Will remain free from falls  3/17/2022 0210 by Kiana Mahan RN  Outcome: Ongoing  Goal: Absence of physical injury  Description: Absence of physical injury  Outcome: Ongoing     Problem: Skin Integrity:  Goal: Will show no infection signs and symptoms  Description: Will show no infection signs and symptoms  Outcome: Ongoing  Goal: Absence of new skin breakdown  Description: Absence of new skin breakdown  Outcome: Ongoing

## 2022-03-17 NOTE — PROGRESS NOTES
Shift assessment completed. Routine vitals obtained. Scheduled medications given. Patient is awake, alert and oriented. Pt c/o pain, 8/10. Pt also c/o nausea. PRN dilaudid and zofran administered. Pt is currently lying in bed, semi-fowlers, lights dim. Call light within reach. No further needs expressed.

## 2022-03-17 NOTE — PROGRESS NOTES
Pt called out, stating they felt they pulled their crow out and that \"it hurts a lot. \" Assessed pt and found crow was still in place. Replaced stat lock. Retook pt's temp, 99.6 F. Scheduled pyridium administered. Pt c/o pain, 8/10, requesting more pain medicine. PRN oxycodone administered. Pt currently sitting up in bed. No further needs expressed.

## 2022-03-17 NOTE — PROGRESS NOTES
Patients head to toe assessment completed. Vital signs WNL, call light within reach. Scheduled medications given per MAR. Patient surgical abdominal pain. Rates pain 8/10. PRN Oxycodone given. Zofran given for nausea. Patient resting in bed. Will continue to monitor.

## 2022-03-18 VITALS
WEIGHT: 217.15 LBS | OXYGEN SATURATION: 98 % | DIASTOLIC BLOOD PRESSURE: 68 MMHG | SYSTOLIC BLOOD PRESSURE: 118 MMHG | HEIGHT: 67 IN | RESPIRATION RATE: 18 BRPM | TEMPERATURE: 98.4 F | HEART RATE: 86 BPM | BODY MASS INDEX: 34.08 KG/M2

## 2022-03-18 PROBLEM — F41.9 ANXIETY: Status: ACTIVE | Noted: 2022-03-18

## 2022-03-18 PROCEDURE — 6360000002 HC RX W HCPCS: Performed by: OBSTETRICS & GYNECOLOGY

## 2022-03-18 PROCEDURE — 94640 AIRWAY INHALATION TREATMENT: CPT

## 2022-03-18 PROCEDURE — 6370000000 HC RX 637 (ALT 250 FOR IP): Performed by: OBSTETRICS & GYNECOLOGY

## 2022-03-18 PROCEDURE — 2580000003 HC RX 258: Performed by: OBSTETRICS & GYNECOLOGY

## 2022-03-18 PROCEDURE — 94761 N-INVAS EAR/PLS OXIMETRY MLT: CPT

## 2022-03-18 PROCEDURE — 87086 URINE CULTURE/COLONY COUNT: CPT

## 2022-03-18 RX ORDER — IBUPROFEN 600 MG/1
600 TABLET ORAL EVERY 6 HOURS
Qty: 30 TABLET | Refills: 0 | Status: SHIPPED | OUTPATIENT
Start: 2022-03-18

## 2022-03-18 RX ORDER — ACETAMINOPHEN 500 MG
500 TABLET ORAL EVERY 6 HOURS PRN
Qty: 30 TABLET | Refills: 0 | Status: SHIPPED | OUTPATIENT
Start: 2022-03-18

## 2022-03-18 RX ORDER — SENNA PLUS 8.6 MG/1
2 TABLET ORAL NIGHTLY PRN
Qty: 10 TABLET | Refills: 0 | Status: SHIPPED | OUTPATIENT
Start: 2022-03-18 | End: 2023-03-18

## 2022-03-18 RX ORDER — LORAZEPAM 0.5 MG/1
0.5 TABLET ORAL EVERY 8 HOURS PRN
Qty: 10 TABLET | Refills: 0 | Status: SHIPPED | OUTPATIENT
Start: 2022-03-18 | End: 2022-04-17

## 2022-03-18 RX ORDER — OXYCODONE HYDROCHLORIDE 5 MG/1
5 TABLET ORAL
Qty: 12 TABLET | Refills: 0 | Status: SHIPPED | OUTPATIENT
Start: 2022-03-18 | End: 2022-03-21

## 2022-03-18 RX ORDER — LORAZEPAM 1 MG/1
1 TABLET ORAL ONCE
Status: COMPLETED | OUTPATIENT
Start: 2022-03-18 | End: 2022-03-18

## 2022-03-18 RX ADMIN — ACETAMINOPHEN 500 MG: 500 TABLET ORAL at 04:50

## 2022-03-18 RX ADMIN — SENNOSIDES AND DOCUSATE SODIUM 1 TABLET: 50; 8.6 TABLET ORAL at 08:11

## 2022-03-18 RX ADMIN — OXYCODONE 10 MG: 5 TABLET ORAL at 01:22

## 2022-03-18 RX ADMIN — KETOROLAC TROMETHAMINE 15 MG: 15 INJECTION, SOLUTION INTRAMUSCULAR; INTRAVENOUS at 14:59

## 2022-03-18 RX ADMIN — LORAZEPAM 1 MG: 1 TABLET ORAL at 14:59

## 2022-03-18 RX ADMIN — OXYCODONE 10 MG: 5 TABLET ORAL at 15:03

## 2022-03-18 RX ADMIN — POLYETHYLENE GLYCOL 3350 17 G: 17 POWDER, FOR SOLUTION ORAL at 08:11

## 2022-03-18 RX ADMIN — HYDROMORPHONE HYDROCHLORIDE 0.5 MG: 1 INJECTION, SOLUTION INTRAMUSCULAR; INTRAVENOUS; SUBCUTANEOUS at 04:50

## 2022-03-18 RX ADMIN — ACETAMINOPHEN 500 MG: 500 TABLET ORAL at 09:15

## 2022-03-18 RX ADMIN — ACETAMINOPHEN 500 MG: 500 TABLET ORAL at 14:59

## 2022-03-18 RX ADMIN — KETOROLAC TROMETHAMINE 15 MG: 15 INJECTION, SOLUTION INTRAMUSCULAR; INTRAVENOUS at 04:50

## 2022-03-18 RX ADMIN — FLUTICASONE PROPIONATE 2 SPRAY: 50 SPRAY, METERED NASAL at 08:12

## 2022-03-18 RX ADMIN — PHENAZOPYRIDINE HYDROCHLORIDE 200 MG: 100 TABLET ORAL at 08:11

## 2022-03-18 RX ADMIN — KETOROLAC TROMETHAMINE 15 MG: 15 INJECTION, SOLUTION INTRAMUSCULAR; INTRAVENOUS at 09:15

## 2022-03-18 RX ADMIN — OXYCODONE 10 MG: 5 TABLET ORAL at 11:37

## 2022-03-18 RX ADMIN — ALBUTEROL SULFATE 2.5 MG: 2.5 SOLUTION RESPIRATORY (INHALATION) at 14:27

## 2022-03-18 RX ADMIN — ALBUTEROL SULFATE 2.5 MG: 2.5 SOLUTION RESPIRATORY (INHALATION) at 08:52

## 2022-03-18 RX ADMIN — Medication 10 ML: at 09:15

## 2022-03-18 RX ADMIN — Medication 2 PUFF: at 08:52

## 2022-03-18 RX ADMIN — PANTOPRAZOLE SODIUM 40 MG: 40 TABLET, DELAYED RELEASE ORAL at 06:18

## 2022-03-18 RX ADMIN — CETIRIZINE HYDROCHLORIDE 10 MG: 10 TABLET, FILM COATED ORAL at 08:11

## 2022-03-18 RX ADMIN — PHENAZOPYRIDINE HYDROCHLORIDE 200 MG: 100 TABLET ORAL at 11:37

## 2022-03-18 RX ADMIN — OXYCODONE 10 MG: 5 TABLET ORAL at 08:12

## 2022-03-18 ASSESSMENT — PAIN DESCRIPTION - LOCATION
LOCATION: ABDOMEN

## 2022-03-18 ASSESSMENT — PAIN DESCRIPTION - ONSET
ONSET: ON-GOING
ONSET: ON-GOING

## 2022-03-18 ASSESSMENT — PAIN SCALES - GENERAL
PAINLEVEL_OUTOF10: 7
PAINLEVEL_OUTOF10: 5
PAINLEVEL_OUTOF10: 7
PAINLEVEL_OUTOF10: 0
PAINLEVEL_OUTOF10: 7
PAINLEVEL_OUTOF10: 5

## 2022-03-18 ASSESSMENT — PAIN DESCRIPTION - PAIN TYPE
TYPE: SURGICAL PAIN

## 2022-03-18 ASSESSMENT — PAIN DESCRIPTION - FREQUENCY
FREQUENCY: CONTINUOUS
FREQUENCY: CONTINUOUS

## 2022-03-18 ASSESSMENT — PAIN DESCRIPTION - DESCRIPTORS
DESCRIPTORS: CRAMPING
DESCRIPTORS: CRAMPING

## 2022-03-18 NOTE — PROGRESS NOTES
[facility]  Gynecologic Oncology   Progress Note    STage IIIC1 [facility]  S/p radical hysterectomy, bilateral salpingectomy, pelvic LND POD4    SUBJECTIVE:  Pain controlled, +flatus    OBJECTIVE:           Physical Exam  VITALS:  /64   Pulse 90   Temp 98.3 °F (36.8 °C) (Tympanic)   Resp 16   Ht 5' 7\" (1.702 m)   Wt 217 lb 2.5 oz (98.5 kg)   LMP 03/04/2022 (Approximate)   SpO2 97%   Breastfeeding No   BMI 34.01 kg/m²   CONSTITUTIONAL:  awake, alert, cooperative, no apparent distress, and appears stated age  ABDOMEN:  Incision c/d/i  MUSCULOSKELETAL:  There is no redness, warmth, or swelling of the joints. Full range of motion noted. Motor strength is 5 out of 5 all extremities bilaterally. Tone is normal.    DATA:  CBC with Differential:    Lab Results   Component Value Date    WBC 9.4 03/17/2022    RBC 3.40 03/17/2022    HGB 11.1 03/17/2022    HCT 32.5 03/17/2022     03/17/2022    MCV 95.6 03/17/2022    MCH 32.5 03/17/2022    MCHC 34.0 03/17/2022    RDW 12.1 03/17/2022    LYMPHOPCT 12.9 03/17/2022    MONOPCT 9.0 03/17/2022    BASOPCT 0.3 03/17/2022    MONOSABS 0.8 03/17/2022    LYMPHSABS 1.2 03/17/2022    EOSABS 0.1 03/17/2022    BASOSABS 0.0 03/17/2022     BMP:    Lab Results   Component Value Date     03/17/2022    K 4.2 03/17/2022    CL 99 03/17/2022    CO2 28 03/17/2022    BUN 10 03/17/2022    LABALBU 4.4 03/10/2022    CREATININE 0.7 03/17/2022    CALCIUM 9.1 03/17/2022    GFRAA >60 03/17/2022    LABGLOM >60 03/17/2022    GLUCOSE 94 03/17/2022     Magnesium:    Lab Results   Component Value Date    MG 2.10 03/15/2022     Phosphorus:    Lab Results   Component Value Date    PHOS 4.5 03/15/2022       FINAL DIAGNOSIS:        A. Peritoneal nodule, excision:        - Reactive fibrotic nodule with hemosiderin deposition and          multinucleated giant cells.        - Negative for malignancy/dysplasia.      B.  Left pelvic sentinel lymph node, excision:        - 1 lymph node positive for metastatic squamous cell carcinoma          (1/1).    C. Right pelvic sentinel lymph node, excision:        - 1 lymph node positive for metastatic squamous cell carcinoma          (1/1).    D. Additional right vaginal margin, excision:        - Reactive squamous mucosa, negative for malignancy/dysplasia.      E. Uterus, cervix, and bilateral fallopian tubes, hysterectomy and        bilateral salpingectomy:      -  Cervix: HPV associated squamous cell carcinoma, moderately        differentiated (pT1b2, pN1a).  See comment.      -  Endometrium: Proliferative type endometrium.      -  Myometrium: No specific pathologic features.      -  Serosa: No specific pathologic features.      -  Fallopian tubes: No specific pathologic features. COMMENT: CASE SUMMARY: (UTERINE CERVIX: Resection)   Standard(s): FIGO Cancer Report 2018 , AJCC-UICC 9   SPECIMEN   Procedure Radical hysterectomy and Bilateral salpingectomy   TUMOR   +Tumor Site: Left superior (anterior) quadrant (12 to 3 o'clock), Left   inferior (posterior) quadrant (3 to 6 o'clock) and Right superior   (anterior) quadrant (9 to 12 o'clock)   Tumor Size:   - Greatest dimension in Centimeters (cm): 4.0 cm   Histologic Type: Squamous cell carcinoma, HPV-associated (p16 is positive   within the malignant cells)   Histologic Grade: G2, moderately differentiated   Stromal Invasion:    Depth of Stromal Invasion    - Specify in Millimeters (mm): 29 mm   Other Tissue / Organ Involvement: Not identified   Lymphovascular Invasion: Present   MARGINS   Margin Status for Invasive Carcinoma:  All margins negative for invasive   carcinoma    +Closest Margin(s) to Invasive Carcinoma: Radial / circumferential    +Distance from Invasive Carcinoma to Closest Margin:    - Exact distance: 1 mm   Margin Status for HSIL or AIS: All margins negative for high-grade   squamous intraepithelial lesion (HSIL) and / or adenocarcinoma in situ   (AIS)   REGIONAL LYMPH NODES   Regional Lymph Node Status:   - Regional lymph nodes present    - Tumor present in pelvic lymph node(s)        Pelvic Lymph Nodes:           Total Number of Pelvic Nodes with Macrometastasis: 1           Total Number of Pelvic Nodes with Micrometastasis: 1           Laterality of Pelvic Node(s) with Tumor:           - Right sentinel and Left sentinel           +Size of Largest Pelvic Tanisha Metastatic Deposit: 12 mm    Lymph Nodes Examined:      Total Number of Pelvic Nodes Examined: 2      Number of Pelvic [place] Nodes Examined: 2   DISTANT METASTASIS   Distant Site(s) Involved, if applicable: Not applicable   PATHOLOGIC STAGE CLASSIFICATION (pTNM, AJCC 9th Version)   pT Category   - pT1b2: Invasive carcinoma greater than 2 cm and less than or equal to 4   cm in greatest dimension   Regional Lymph Node Modifier   - (sn)   pN Category   - pN1a: Regional lymph node metastasis (greater than 2.0 mm diameter) to   pelvic lymph nodes     BEATRIS/BEATRIS     ASSESSMENT AND PLAN:    STage IIIC1 [facility] discussed path w/ parents but pt is unwilling to listen to path report at this time. Became hysterical with hearing lymph nodes are involved. She began screaming and crying. Unfortunately, cannot fully discuss further details of dx or treatment plan. Pt desires d/c. She stated this prior to the attempt to discuss the path report. After this, she also stated that she is going home and not staying. Lengthy conversation with parents regarding pt's mood and emotional state. Pt denies she wants to hurt herself or others. In the past patient has stated she would rather die than have treatment for her disease. Someone will be home w/ patient at all times. Medication needs to be under the control of one of her parents. I have referred pt to psychiatry previously for anxiety/depression/adjustment d/o. Pt's mom states that there have been insurance issues with this. I still recommend moving forward.      Of note, pt's baseline is crying at times and emotional outbursts. Pt's diagnosis is challenging at any age. Further support from family and medical professionals and recommended. This has been discussed previously and today. Additional information regarding cervical cancer, anxiety, depression have been provided. Ko Zee, [address] [place] [address] Oncology  [phone])

## 2022-03-18 NOTE — PROGRESS NOTES
Pt refused Lovenox injection this AM. Pt up in room ambulating without difficulty. Abdominal binder and rcow catheter in place.

## 2022-03-18 NOTE — PROGRESS NOTES
Discharge instructions reviewed with pt and family. Discharge medications given to pt from OP pharmacy. Pt verbalized need to schedule follow up appt with Dr Yamilet Ledesma. Pt escorted by this nurse via wheelchair to private vehicle. Pt with all personal belongings and DC instructions.

## 2022-03-18 NOTE — PROGRESS NOTES
Patients head to toe assessment completed. Vital signs WNL, call light within reach. Scheduled medications given per MAR. Patient complain of surgical abdominal pain. Rates pain 7/10. PRN Morphine given. Patient resting in bed. Will continue to monitor.

## 2022-03-18 NOTE — PLAN OF CARE
Problem: Pain:  Goal: Pain level will decrease  Description: Pain level will decrease  3/18/2022 1146 by Elizabeth Najera RN  Outcome: Met This Shift  3/18/2022 0425 by Elina Alba RN  Outcome: Ongoing  Goal: Control of acute pain  Description: Control of acute pain  3/18/2022 1146 by Elizabeth Najera RN  Outcome: Met This Shift  3/18/2022 0425 by Elina Alba RN  Outcome: Ongoing  Goal: Control of chronic pain  Description: Control of chronic pain  3/18/2022 1146 by Elizabeth Najera RN  Outcome: Met This Shift  3/18/2022 0425 by Elina Alba RN  Outcome: Ongoing

## 2022-03-18 NOTE — PROGRESS NOTES
CLINICAL PHARMACY NOTE: MEDS TO BEDS    Total # of Prescriptions Filled: 6   The following medications were delivered to the patient:  · Oxycodone 5mg  · Ibuprofen 600mg  · Lorazepam 0.5mg  · Alba-angie 8.6mg  · Eliquis 2.5mg  · Acetaminophen  500mg    Additional Documentation:    Medications were given to and signed for by RAE Diop

## 2022-03-19 LAB — URINE CULTURE, ROUTINE: NORMAL

## 2022-04-04 NOTE — OP NOTE
Operative Note      Patient: Eduard Raphael  YOB: 1990  MRN: [id number]    Date of Procedure: 3/14/2022    Pre-Op Diagnosis: C53.1 CERVICAL CANCER    Post-Op Diagnosis: Same       Procedure(s):  ABDOMINAL RADICAL HYSTERECTOMY, BILATERAL SALPINGECTOMY, SENTINEL LYMPH NODE BIOPSY, PELVIC LYMPH NODE DISSECTION, OVARIAN TRANSPOSITION ([id number], [id number], [id number])    Surgeon(s):  Mariah Alonso MD    Assistant:   Surgical Assistant: Paige Carlin RN    Anesthesia: General    Estimated Blood Loss (mL): 806     Complications: None    Specimens:   ID Type Source Tests Collected by Time Destination   A : A) PERITONEAL NODULE Tissue Tissue SURGICAL PATHOLOGY Mariah Alonso MD 3/14/2022 0936    B : B) LEFT PELVIC SENTINEL LYMPH NODE Tissue Tissue SURGICAL PATHOLOGY Mariah Alonso MD 3/14/2022 9125    C : C) RIGHT PELVIC SENTINEL LYMPH NODE Tissue Tissue SURGICAL PATHOLOGY Mariah Alonso MD 3/14/2022 7945    D : D) ADDITIONAL RIGHT VAGINAL MARGIN Tissue Tissue SURGICAL PATHOLOGY Mariah Alonso MD 3/14/2022 1105    E : E) CERVIX, UTERUS AND BILATERAL TUBES Tissue Tissue SURGICAL PATHOLOGY Mariah Alonso MD 3/14/2022 1109        Implants:  * No implants in log *      Drains:   [REMOVED] Urethral Catheter Non-latex 16 fr (Removed)   Catheter Indications Perioperative use for selected surgical procedures 03/18/22 0808   Securement Device Date Changed 03/18/22 03/18/22 0808   Site Assessment No urethral drainage 03/18/22 0808   Urine Color Orange 03/18/22 1511   Urine Appearance Clear 03/18/22 1511   Urine Odor Malodorous 03/18/22 1511   Output (mL) 200 mL 03/18/22 1511       Findings: 4-5 cm cervical tumor. Bilateral parametria is palpably free of tumor. 1+ cm vaginal margin noted on all sides (additional right margin taken). Bilateral cystic ovaries. Normal upper abdomen    Indication for procedure:  29 y.o. 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  Seen at [facility] with same recommendations for management and due to tumor size is not a candidate for trachelectomy. After much consideration pt has agreed to surgical definitive mgmt. PET/CT negative for distant disease. Patient is initially upset and crying because the consent states sentinel lymph node biopsy. Patient states she wants minimal done and does not want \"a lot of lymph nodes\" removed. I once again explained the role of the sentinel lymph node evaluation and she once again consents to this. Patient is also upset that this surgery is a radical hysterectomy. The patient has had counseling from me and [facility] regarding the hysterectomy type (modified radical abdominal hysterectomy). Patient would prefer a simple hysterectomy. I once again explained in the presence of her mother the role of surgery to treat cervical cancer. Patient has been reluctant for surgery, chemotherapy, and radiation. She has had challenges with proceeding with surgery and continues to decline chemotherapy and radiation. I once again told patient that additional treatment may be recommended pending pathology results. I will not remove her ovaries unless abnormal in appearance. Patient has been referred to [facility] previously and has met challenges from a financial standpoint. Additional resources have been provided. The risks, benefits, and alternatives have been discussed including but not limited to: infection, pain, bleeding, damage to surrounding structures including but not limited to blood vessels, nerves, bladder, bowel, ureters, need for other procedures, risk of developing blood clots and risks of anesthesia. All questions have been answered and consents will be signed on the day of the procedure. Detailed Description of Procedure: The patient was taken to the operating room. She was placed in a dorsal supine position with sequential compression devices on prior to the administration of anesthesia.  Preoperative antibiotics were infused, and general endotracheal anesthesia was induced without difficulty. Legs were then elevated into Dexter stirrups. Arms were tucked, and we tested the patient; she was able to tolerate steep Trendelenburg. We prepped and draped the patient in the usual sterile fashion and placed a catheter. ICG/lymphozurin were injected at the 3 and 9 o'clock positions of the cervix in a superficial and deep fashion. A pfannenstiel skin incision was made and carried down to underlying layer of fascia. The fascia was excised and extended laterally. The peritoneal cavity was entered with no ascites noted. The upper abdomen was palpated and was palpably and visibly normal.   The bowel was packed away, and Bo ring retractor was placed, the Bookwalter retractor was placed. The side wall peritoneum was entered and the retroperitoneal space developed, I identified the ureter. The pararectal and paravesical spaces were opened. [place] lymph nodes were identified bilaterally and removed. Fallopian tubes were elevated and dissected away from its ovarian attachment. Uterovarian ligaments were clamped, fulgurated and suture ligated. The round ligaments were also fulgurated. Ureterolysis was performed bilaterally dissecting the ureters away from it's peritoneal attachments until the uterine arteries were identified. The uterine arteries were skeletonized at their origin clamped, cut, suture ligated and hemostatic clip placed on the non-specimen side with good hemostasis noted. The ureters were further dissected away from the medial broad ligament and mobilized to a lateral position. The parametrial tissue was elevated allowing for lateral mobilization of the ureter. The ureter was tunneled through the broad ligament and each soft tissue pedicle was fulgurated and divided. The bladder flap was further developed, dissecting the bladder peritoneum away from the uterus, cervix, and vagina.  The uterus was anteverted and the rectovaginal septum was created with endoshears. The uterosacral ligaments were divided via the ligasure device just above the splanchnic nerves. The paravaginal tissue was met fulgurated and cut. Suture ligature used at times for better hemostasis. The vagina was excised and the specimen removed. Gross visualization of the specimen revealed a 1-2 cm vaginal margin. I took an additional margin on the patient's right. The vagina was closed with two 0-vicryl suture and V-lock suture in a running fashion. Good hemostasis was noted. The ovaries were then transposed over the pelvic brim with suture and ligaclips placed marking their position. Copious irrigation was performed. Retractor and all packing were removed from the abdomen. The fascia was approximated with running 0 vicryl suture. Lavage was again carried out. Hemostasis was observed. The subdermal layer was closed with 3-0 Vicryl suture. The skin was closed with 4-0 monocryl suture and dermabond glue. Abdominal binder placed. Instrument, sponge, and needle counts were correct prior to abdominal closure and at the conclusion of the case.        Electronically signed by Lazara Krishnan MD on 4/4/2022 at 2:55 PM

## 2022-04-04 NOTE — PROGRESS NOTES
[facility]  Gynecologic Oncology   Progress Note    POD1 s/p radical abdominal hysterectomy, bilateral salpingectomy    SUBJECTIVE:  Pain moderately controlled, no flatus    OBJECTIVE:           Physical Exam  VITALS:  BP (!) 95/50   Pulse 90   Temp 97.2 °F (36.2 °C) (Temporal)   Resp 16   Ht 5' 7\" (1.702 m)   Wt 209 lb 10.5 oz (95.1 kg)   LMP 03/04/2022 (Approximate)   SpO2 98%   Breastfeeding No   BMI 32.84 kg/m²   24HR INTAKE/OUTPUT:    Intake/Output Summary (Last 24 hours) at 3/15/2022 1846  Last data filed at 3/15/2022 1711  Gross per 24 hour   Intake 2894.49 ml   Output 2500 ml   Net 394.49 ml     CONSTITUTIONAL:  awake, alert, cooperative, no apparent distress, and appears stated age  ABDOMEN:  Ray Part, incision c/d/i  MUSCULOSKELETAL:  There is no redness, warmth, or swelling of the joints. Full range of motion noted. Motor strength is 5 out of 5 all extremities bilaterally. Tone is normal.    DATA:  CBC with Differential:    Lab Results   Component Value Date    WBC 8.3 03/15/2022    RBC 3.50 03/15/2022    HGB 11.6 03/15/2022    HCT 33.9 03/15/2022     03/15/2022    MCV 96.8 03/15/2022    MCH 33.1 03/15/2022    MCHC 34.2 03/15/2022    RDW 12.4 03/15/2022    LYMPHOPCT 17.2 03/15/2022    MONOPCT 7.3 03/15/2022    BASOPCT 0.1 03/15/2022    MONOSABS 0.6 03/15/2022    LYMPHSABS 1.4 03/15/2022    EOSABS 0.0 03/15/2022    BASOSABS 0.0 03/15/2022     BMP:    Lab Results   Component Value Date     03/15/2022    K 4.1 03/15/2022     03/15/2022    CO2 25 03/15/2022    BUN 7 03/15/2022    LABALBU 4.4 03/10/2022    CREATININE 0.7 03/15/2022    CALCIUM 9.1 03/15/2022    GFRAA >60 03/15/2022    LABGLOM >60 03/15/2022    GLUCOSE 100 03/15/2022     Magnesium:    Lab Results   Component Value Date    MG 2.10 03/15/2022     Phosphorus:    Lab Results   Component Value Date    PHOS 4.5 03/15/2022       ASSESSMENT AND PLAN:  Pt moved from post partum. Apologies given for this.  Spoke to [facility] and surgery dept on why this occurred. Discussed surgical findings w/ pt. Path is pending. Encouraged I.S. and nebs (pt declines nebs). She also needs to walk TID. Do not remove catheter. Ko Upton, [address] [place] [address] Oncology  [phone])

## 2022-04-04 NOTE — DISCHARGE SUMMARY
Physician Discharge Summary     Patient ID:  Kim Fraire  [id number]  2 y.o.  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 Seen at [facility] with same recommendations for management and due to tumor size is not a candidate for trachelectomy. After much consideration pt has agreed to surgical definitive mgmt. PET/CT negative for distant disease. Post surgery tx'd to postpartum by the hospital utilization team. I felt this pt's situation was better suited for a med/surg unit where my patients would normally be sent. Pt tx'd. Pt was slow to move post operatively. Transitioned from PCA to oral/iv breakthrough medications. Chaves remained in place until POD4 to allow for adequate post op bladder decompression. Pathology returned on POD4. Spoke to mother and asked her to be present for discussion of the results. Father was also present. Attempted to discuss results but pt began yelling/crying and declined to listen to the report. She continued yelling and stating she wanted to leave immediately. Prior to this episode I did feel patient could go home. Would have liked voiding trial but precautions given as pt is demanding departure. Spoke again with pt's parents. Path was reviewed thoroughly with them. Reviewed also with pathology. All vaginal margins are adequate (the close margin that is described is not clinically relevant).   However given positive lymph nodes stage IIIC1 disease I am recommending adjuvant tx. Hopefully, can discuss in the office when the patient is ready. Will once again work on psych referrals. Pt does not appear to be a direct harm to self or others. Consults: none    Significant Diagnostic Studies: labs:   Lab Results   Component Value Date    WBC 9.4 03/17/2022    HGB 11.1 (L) 03/17/2022    HCT 32.5 (L) 03/17/2022    MCV 95.6 03/17/2022     03/17/2022     Lab Results   Component Value Date     03/17/2022    K 4.2 03/17/2022    CL 99 03/17/2022    CO2 28 03/17/2022    BUN 10 03/17/2022    CREATININE 0.7 03/17/2022    GLUCOSE 94 03/17/2022    CALCIUM 9.1 03/17/2022        Treatments: IV hydration, antibiotics: Ancef, anticoagulation: LMW heparin and surgery: ABDOMINAL RADICAL HYSTERECTOMY, BILATERAL SALPINGECTOMY, SENTINEL LYMPH NODE BIOPSY, PELVIC LYMPH NODE DISSECTION, OVARIAN TRANSPOSITION    Discharge Exam:  NAD  Incision c/d/i  No c/c/e    Disposition: home    Patient Instructions:   [unfilled]  Activity: per discharge instructions  Diet: regular diet  Wound Care: keep wound clean and dry    Follow-up with Dr. Kingsley Mcgrath in 2 weeks.     Signed:  Alize Johnson MD  4/4/2022  2:02 PM

## 2022-11-09 ENCOUNTER — HOSPITAL ENCOUNTER (OUTPATIENT)
Dept: CT IMAGING | Age: 32
Discharge: HOME | End: 2022-11-09
Payer: COMMERCIAL

## 2022-11-09 DIAGNOSIS — Z90.710 ACQUIRED ABSENCE OF BOTH CERVIX AND UTERUS: ICD-10-CM

## 2022-11-09 DIAGNOSIS — R10.10 PAIN OF UPPER ABDOMEN: ICD-10-CM

## 2022-11-09 DIAGNOSIS — C53.9 MALIGNANT NEOPLASM OF CERVIX, UNSPECIFIED SITE (HCC): ICD-10-CM

## 2022-11-09 PROCEDURE — 74177 CT ABD & PELVIS W/CONTRAST: CPT

## 2022-11-09 PROCEDURE — 6360000004 HC RX CONTRAST MEDICATION: Performed by: OBSTETRICS & GYNECOLOGY

## 2022-11-09 RX ADMIN — IOPAMIDOL 75 ML: 755 INJECTION, SOLUTION INTRAVENOUS at 16:33

## 2022-11-09 RX ADMIN — DIATRIZOATE MEGLUMINE AND DIATRIZOATE SODIUM 20 ML: 660; 100 LIQUID ORAL; RECTAL at 16:32

## 2022-11-29 ENCOUNTER — TELEPHONE (OUTPATIENT)
Dept: INTERVENTIONAL RADIOLOGY/VASCULAR | Age: 32
End: 2022-11-29

## 2022-12-07 ENCOUNTER — HOSPITAL ENCOUNTER (OUTPATIENT)
Dept: CT IMAGING | Age: 32
Discharge: HOME | End: 2022-12-07
Payer: COMMERCIAL

## 2022-12-07 VITALS
TEMPERATURE: 98 F | WEIGHT: 180 LBS | HEIGHT: 67 IN | HEART RATE: 89 BPM | RESPIRATION RATE: 18 BRPM | DIASTOLIC BLOOD PRESSURE: 62 MMHG | SYSTOLIC BLOOD PRESSURE: 117 MMHG | OXYGEN SATURATION: 100 % | BODY MASS INDEX: 28.25 KG/M2

## 2022-12-07 DIAGNOSIS — R19.00 ABDOMINAL MASS, UNSPECIFIED ABDOMINAL LOCATION: ICD-10-CM

## 2022-12-07 DIAGNOSIS — Z90.710 VAGINAL PAP SMEAR FOLLOWING HYSTERECTOMY FOR MALIGNANCY: ICD-10-CM

## 2022-12-07 DIAGNOSIS — Z08 VAGINAL PAP SMEAR FOLLOWING HYSTERECTOMY FOR MALIGNANCY: ICD-10-CM

## 2022-12-07 DIAGNOSIS — N13.30 HYDRONEPHROSIS, UNSPECIFIED HYDRONEPHROSIS TYPE: ICD-10-CM

## 2022-12-07 DIAGNOSIS — R10.9 STOMACH ACHE: ICD-10-CM

## 2022-12-07 LAB
ANION GAP SERPL CALCULATED.3IONS-SCNC: 8 MMOL/L (ref 3–16)
BUN BLDV-MCNC: 14 MG/DL (ref 7–20)
CALCIUM SERPL-MCNC: 9.5 MG/DL (ref 8.3–10.6)
CHLORIDE BLD-SCNC: 101 MMOL/L (ref 99–110)
CO2: 25 MMOL/L (ref 21–32)
CREAT SERPL-MCNC: 0.8 MG/DL (ref 0.6–1.1)
GFR SERPL CREATININE-BSD FRML MDRD: >60 ML/MIN/{1.73_M2}
GLUCOSE BLD-MCNC: 102 MG/DL (ref 70–99)
HCT VFR BLD CALC: 39.1 % (ref 36–48)
HEMOGLOBIN: 13.2 G/DL (ref 12–16)
INR BLD: 0.98 (ref 0.87–1.14)
MCH RBC QN AUTO: 31.1 PG (ref 26–34)
MCHC RBC AUTO-ENTMCNC: 33.8 G/DL (ref 31–36)
MCV RBC AUTO: 91.8 FL (ref 80–100)
PDW BLD-RTO: 12.7 % (ref 12.4–15.4)
PLATELET # BLD: 275 K/UL (ref 135–450)
PMV BLD AUTO: 8.1 FL (ref 5–10.5)
POTASSIUM SERPL-SCNC: 4 MMOL/L (ref 3.5–5.1)
PROTHROMBIN TIME: 12.9 SEC (ref 11.7–14.5)
RBC # BLD: 4.26 M/UL (ref 4–5.2)
SODIUM BLD-SCNC: 134 MMOL/L (ref 136–145)
WBC # BLD: 7.4 K/UL (ref 4–11)

## 2022-12-07 PROCEDURE — 36415 COLL VENOUS BLD VENIPUNCTURE: CPT

## 2022-12-07 PROCEDURE — 6360000002 HC RX W HCPCS: Performed by: RADIOLOGY

## 2022-12-07 PROCEDURE — 85610 PROTHROMBIN TIME: CPT

## 2022-12-07 PROCEDURE — 6370000000 HC RX 637 (ALT 250 FOR IP)

## 2022-12-07 PROCEDURE — 88305 TISSUE EXAM BY PATHOLOGIST: CPT

## 2022-12-07 PROCEDURE — 7100000011 HC PHASE II RECOVERY - ADDTL 15 MIN

## 2022-12-07 PROCEDURE — 88342 IMHCHEM/IMCYTCHM 1ST ANTB: CPT

## 2022-12-07 PROCEDURE — 99152 MOD SED SAME PHYS/QHP 5/>YRS: CPT

## 2022-12-07 PROCEDURE — 7100000010 HC PHASE II RECOVERY - FIRST 15 MIN

## 2022-12-07 PROCEDURE — 77012 CT SCAN FOR NEEDLE BIOPSY: CPT

## 2022-12-07 PROCEDURE — 85027 COMPLETE CBC AUTOMATED: CPT

## 2022-12-07 PROCEDURE — 80048 BASIC METABOLIC PNL TOTAL CA: CPT

## 2022-12-07 RX ORDER — FENTANYL CITRATE 50 UG/ML
INJECTION, SOLUTION INTRAMUSCULAR; INTRAVENOUS
Status: COMPLETED | OUTPATIENT
Start: 2022-12-07 | End: 2022-12-07

## 2022-12-07 RX ORDER — ACETAMINOPHEN 325 MG/1
TABLET ORAL
Status: COMPLETED
Start: 2022-12-07 | End: 2022-12-07

## 2022-12-07 RX ORDER — MIDAZOLAM HYDROCHLORIDE 2 MG/2ML
INJECTION, SOLUTION INTRAMUSCULAR; INTRAVENOUS
Status: COMPLETED | OUTPATIENT
Start: 2022-12-07 | End: 2022-12-07

## 2022-12-07 RX ORDER — ACETAMINOPHEN 325 MG/1
650 TABLET ORAL EVERY 4 HOURS PRN
Status: DISCONTINUED | OUTPATIENT
Start: 2022-12-07 | End: 2022-12-08 | Stop reason: HOSPADM

## 2022-12-07 RX ADMIN — FENTANYL CITRATE 50 MCG: 50 INJECTION, SOLUTION INTRAMUSCULAR; INTRAVENOUS at 11:41

## 2022-12-07 RX ADMIN — MIDAZOLAM HYDROCHLORIDE 1 MG: 1 INJECTION, SOLUTION INTRAMUSCULAR; INTRAVENOUS at 11:41

## 2022-12-07 RX ADMIN — ACETAMINOPHEN 650 MG: 325 TABLET ORAL at 12:30

## 2022-12-07 RX ADMIN — MIDAZOLAM HYDROCHLORIDE 1 MG: 1 INJECTION, SOLUTION INTRAMUSCULAR; INTRAVENOUS at 11:46

## 2022-12-07 RX ADMIN — FENTANYL CITRATE 50 MCG: 50 INJECTION, SOLUTION INTRAMUSCULAR; INTRAVENOUS at 11:38

## 2022-12-07 RX ADMIN — FENTANYL CITRATE 50 MCG: 50 INJECTION, SOLUTION INTRAMUSCULAR; INTRAVENOUS at 11:49

## 2022-12-07 RX ADMIN — MIDAZOLAM HYDROCHLORIDE 1 MG: 1 INJECTION, SOLUTION INTRAMUSCULAR; INTRAVENOUS at 11:38

## 2022-12-07 ASSESSMENT — PAIN SCALES - GENERAL
PAINLEVEL_OUTOF10: 7
PAINLEVEL_OUTOF10: 7

## 2022-12-07 ASSESSMENT — PAIN DESCRIPTION - DESCRIPTORS: DESCRIPTORS: ACHING

## 2022-12-07 ASSESSMENT — PAIN DESCRIPTION - ORIENTATION: ORIENTATION: LEFT

## 2022-12-07 ASSESSMENT — PAIN DESCRIPTION - LOCATION: LOCATION: BUTTOCKS

## 2022-12-07 NOTE — BRIEF OP NOTE
Brief Postoperative Note    Jair Mcleod  YOB: 1990  [id number]    Pre-operative Diagnosis: pelvic mass    Post-operative Diagnosis: Same    Procedure: CT-guided pelvic mass biopsy    Anesthesia: Moderate Sedation    Surgeons: Linnette Staley MD    Estimated Blood Loss: Less than 5 mL    Complications: None    Specimens: Was Obtained: 3 18g cores    Findings: Successful CT-guided pelvic mass biopsy.     Electronically signed by Linnette Staley MD on 12/7/2022 at 11:57 AM

## 2022-12-07 NOTE — DISCHARGE INSTRUCTIONS
GENERAL SURGERY DISCHARGE INSTRUCTIONS    Follow your surgeons instructions. Observe the operative area for signs of excessive bleeding. If needed, apply pressure, elevate if able, and contact your surgeon. Observe the operative site for any signs of infection- such as increased pain, redness, fever greater than 101 degrees, swelling, foul odor or drainage. Contact your surgeon if any of these symptoms are present. You may remove your dressing tin 48 hours. (Do not leave it on much longer than that to prevent infection)            SEDATION DISCHARGE INSTRUCTIONS  12/7/2022    Wear your seatbelt home. You are under the influence of drugs. Do not drink alcohol, drive, operate machinery, or make any important decisions or sign any legal documents for 24 hours  A responsible adult needs to be with you for 24 hours. You may experience lightheadedness, dizziness, nausea, heightened emotions and/or sleepiness following surgery. Rest at home today- increase activity as tolerated. Progress slowly to a regular diet and drink plenty of fluids unless your physician has instructed you otherwise. If nausea becomes a problem, call your physician. Coughing, sore throat, and muscle aches are other side effects of anesthesia and should improve with time. Do not drive or operate machinery while taking narcotics. ATTENTION FEMALE PATIENTS: if you use an oral contraceptive or birth control pill, you need to use an additional or alternative method for pregnancy prevention for the next thirty days. Medications given today may render your contraceptive ineffective for this cycle.

## 2022-12-07 NOTE — PROGRESS NOTES
Pt to bay 10 vss dsg to left buttock cdi call light in reach family at bedside  rate pain same as home a 7/10 will continue to monitor

## 2022-12-07 NOTE — PRE SEDATION
Sedation Pre-Procedure Note    Patient Name: Coretta Sanchez   YOB: 1990  Room/Bed: Room/bed info not found  Medical Record Number: [id number]  Date: 12/7/2022   Time: 11:57 AM       Indication:  Pelvic mass biopsy. Consent: I have discussed with the patient and/or the patient representative the indication, alternatives, and the possible risks and/or complications of the planned procedure and the anesthesia methods. The patient and/or patient representative appear to understand and agree to proceed. Vital Signs:   Vitals:    12/07/22 1152   BP: 122/78   Pulse: 78   Resp: (!) 9   Temp:    SpO2: 97%       Past Medical History:   has a past medical history of ADHD (attention deficit hyperactivity disorder), Asthma, Bipolar 1 disorder ([facility] [address] Bipolar disorder ([facility] [address] COVID-19, Depression, Irregular periods, and PONV (postoperative nausea and vomiting). Past Surgical History:   has a past surgical history that includes Stomach surgery (06/2021); [place] tooth extraction; Cervix biopsy (N/A, 10/28/2021); LEEP (11/2021); and Total abdominal hysterectomy w/ bilateral salpingoophorectomy (N/A, 3/14/2022). Medications:   Scheduled Meds:   Continuous Infusions:   PRN Meds:   Home Meds:   Prior to Admission medications    Medication Sig Start Date End Date Taking?  Authorizing Provider   acetaminophen (TYLENOL) 500 MG tablet Take 1 tablet by mouth every 6 hours as needed for Pain 3/18/22   Wili Alvarez MD   apixaban (ELIQUIS) 2.5 MG TABS tablet Take 1 tablet by mouth 2 times daily for 22 days 3/18/22 4/9/22  Wili Alvarez MD   ibuprofen (ADVIL;MOTRIN) 600 MG tablet Take 1 tablet by mouth every 6 hours 3/18/22   Wili Alvarez MD   senna (SENOKOT) 8.6 MG tablet Take 2 tablets by mouth nightly as needed for Constipation 3/18/22 3/18/23  Wili Alvarez MD   Calcium Citrate-Vitamin D (CITRACAL + D PO) Take by mouth    Historical Provider, MD   Multiple Vitamin (MVI, BARIATRIC ADVANTAGE VITABAND, CHEW TAB) Take 1 tablet by mouth daily    Historical Provider, MD   budesonide-formoterol (SYMBICORT) 160-4.5 MCG/ACT AERO INHALE 2 PUFFS BY MOUTH INTO THE LUNGS TWO TIMES A DAY 12/22/20   Historical Provider, MD   albuterol sulfate  (90 Base) MCG/ACT inhaler INHALE 2 PUFFS INTO THE LUNGS EVERY 4 TO 6 HOURS AS NEEDED 2/1/21   Historical Provider, MD   loratadine (CLARITIN) 10 MG tablet Take 10 mg by mouth daily    Historical Provider, MD   traZODone (DESYREL) 50 MG tablet Take 50 mg by mouth nightly    Historical Provider, MD   albuterol (PROVENTIL) (2.5 MG/3ML) 0.083% nebulizer solution Take 2.5 mg by nebulization every 6 hours as needed for Wheezing    Historical Provider, MD   omeprazole (PRILOSEC) 10 MG capsule Take 10 mg by mouth in the morning and at bedtime     Historical Provider, MD   mometasone (NASONEX) 50 MCG/ACT nasal spray 2 sprays by Nasal route daily    Historical Provider, MD     Coumadin Use Last 7 Days:  no  Antiplatelet drug therapy use last 7 days: no  Other anticoagulant use last 7 days: no  Additional Medication Information:  n/a      Pre-Sedation Documentation and Exam:   I have reviewed the patient's history and review of systems.     Mallampati Airway Assessment:  Mallampati Class II - (soft palate, fauces & uvula are visible)    Prior History of Anesthesia Complications:   none    ASA Classification:  Class 2 - A normal healthy patient with mild systemic disease    Sedation/ Anesthesia Plan:   intravenous sedation    Medications Planned:   midazolam (Versed) intravenously and fentanyl intravenously    Patient is an appropriate candidate for plan of sedation: yes    Electronically signed by Dania Leon MD on 12/7/2022 at 11:57 AM

## 2022-12-27 ENCOUNTER — TELEPHONE (OUTPATIENT)
Dept: INTERVENTIONAL RADIOLOGY/VASCULAR | Age: 32
End: 2022-12-27

## 2023-01-03 ENCOUNTER — HOSPITAL ENCOUNTER (OUTPATIENT)
Dept: INTERVENTIONAL RADIOLOGY/VASCULAR | Age: 33
Discharge: HOME | End: 2023-01-03
Payer: COMMERCIAL

## 2023-01-03 VITALS
DIASTOLIC BLOOD PRESSURE: 65 MMHG | HEIGHT: 67 IN | WEIGHT: 178 LBS | BODY MASS INDEX: 27.94 KG/M2 | TEMPERATURE: 97.4 F | HEART RATE: 80 BPM | SYSTOLIC BLOOD PRESSURE: 106 MMHG | OXYGEN SATURATION: 100 % | RESPIRATION RATE: 16 BRPM

## 2023-01-03 DIAGNOSIS — N13.30 HYDRONEPHROSIS, UNSPECIFIED HYDRONEPHROSIS TYPE: ICD-10-CM

## 2023-01-03 LAB
ANION GAP SERPL CALCULATED.3IONS-SCNC: 9 MMOL/L (ref 3–16)
BUN BLDV-MCNC: 10 MG/DL (ref 7–20)
CALCIUM SERPL-MCNC: 9.5 MG/DL (ref 8.3–10.6)
CHLORIDE BLD-SCNC: 100 MMOL/L (ref 99–110)
CO2: 27 MMOL/L (ref 21–32)
CREAT SERPL-MCNC: 1 MG/DL (ref 0.6–1.1)
GFR SERPL CREATININE-BSD FRML MDRD: >60 ML/MIN/{1.73_M2}
GLUCOSE BLD-MCNC: 93 MG/DL (ref 70–99)
HCT VFR BLD CALC: 38.7 % (ref 36–48)
HEMOGLOBIN: 13.3 G/DL (ref 12–16)
INR BLD: 1.02 (ref 0.87–1.14)
MCH RBC QN AUTO: 31.2 PG (ref 26–34)
MCHC RBC AUTO-ENTMCNC: 34.3 G/DL (ref 31–36)
MCV RBC AUTO: 91 FL (ref 80–100)
PDW BLD-RTO: 12.3 % (ref 12.4–15.4)
PLATELET # BLD: 352 K/UL (ref 135–450)
PMV BLD AUTO: 8.4 FL (ref 5–10.5)
POTASSIUM SERPL-SCNC: 3.9 MMOL/L (ref 3.5–5.1)
PROTHROMBIN TIME: 13.3 SEC (ref 11.7–14.5)
RBC # BLD: 4.26 M/UL (ref 4–5.2)
SODIUM BLD-SCNC: 136 MMOL/L (ref 136–145)
WBC # BLD: 8.7 K/UL (ref 4–11)

## 2023-01-03 PROCEDURE — 7100000011 HC PHASE II RECOVERY - ADDTL 15 MIN

## 2023-01-03 PROCEDURE — 6360000004 HC RX CONTRAST MEDICATION: Performed by: STUDENT IN AN ORGANIZED HEALTH CARE EDUCATION/TRAINING PROGRAM

## 2023-01-03 PROCEDURE — 2500000003 HC RX 250 WO HCPCS: Performed by: STUDENT IN AN ORGANIZED HEALTH CARE EDUCATION/TRAINING PROGRAM

## 2023-01-03 PROCEDURE — 0T778DZ DILATION OF LEFT URETER WITH INTRALUMINAL DEVICE, VIA NATURAL OR ARTIFICIAL OPENING ENDOSCOPIC: ICD-10-PCS | Performed by: STUDENT IN AN ORGANIZED HEALTH CARE EDUCATION/TRAINING PROGRAM

## 2023-01-03 PROCEDURE — BT1F1ZZ FLUOROSCOPY OF LEFT KIDNEY, URETER AND BLADDER USING LOW OSMOLAR CONTRAST: ICD-10-PCS | Performed by: STUDENT IN AN ORGANIZED HEALTH CARE EDUCATION/TRAINING PROGRAM

## 2023-01-03 PROCEDURE — 2580000003 HC RX 258: Performed by: STUDENT IN AN ORGANIZED HEALTH CARE EDUCATION/TRAINING PROGRAM

## 2023-01-03 PROCEDURE — 85610 PROTHROMBIN TIME: CPT

## 2023-01-03 PROCEDURE — 7100000010 HC PHASE II RECOVERY - FIRST 15 MIN

## 2023-01-03 PROCEDURE — 36415 COLL VENOUS BLD VENIPUNCTURE: CPT

## 2023-01-03 PROCEDURE — 6360000002 HC RX W HCPCS: Performed by: STUDENT IN AN ORGANIZED HEALTH CARE EDUCATION/TRAINING PROGRAM

## 2023-01-03 PROCEDURE — 85027 COMPLETE CBC AUTOMATED: CPT

## 2023-01-03 PROCEDURE — 0TP98DZ REMOVAL OF INTRALUMINAL DEVICE FROM URETER, VIA NATURAL OR ARTIFICIAL OPENING ENDOSCOPIC: ICD-10-PCS | Performed by: STUDENT IN AN ORGANIZED HEALTH CARE EDUCATION/TRAINING PROGRAM

## 2023-01-03 PROCEDURE — 80048 BASIC METABOLIC PNL TOTAL CA: CPT

## 2023-01-03 PROCEDURE — 99153 MOD SED SAME PHYS/QHP EA: CPT

## 2023-01-03 PROCEDURE — 50694 PLMT URETERAL STENT PRQ: CPT

## 2023-01-03 PROCEDURE — C1769 GUIDE WIRE: HCPCS

## 2023-01-03 PROCEDURE — 99152 MOD SED SAME PHYS/QHP 5/>YRS: CPT

## 2023-01-03 RX ORDER — TAMSULOSIN HYDROCHLORIDE 0.4 MG/1
0.4 CAPSULE ORAL DAILY
Status: ON HOLD | COMMUNITY

## 2023-01-03 RX ORDER — FENTANYL CITRATE 50 UG/ML
INJECTION, SOLUTION INTRAMUSCULAR; INTRAVENOUS
Status: COMPLETED | OUTPATIENT
Start: 2023-01-03 | End: 2023-01-03

## 2023-01-03 RX ORDER — DIPHENHYDRAMINE HYDROCHLORIDE 50 MG/ML
INJECTION INTRAMUSCULAR; INTRAVENOUS
Status: COMPLETED | OUTPATIENT
Start: 2023-01-03 | End: 2023-01-03

## 2023-01-03 RX ORDER — GABAPENTIN 100 MG/1
100 CAPSULE ORAL 3 TIMES DAILY
Status: ON HOLD | COMMUNITY

## 2023-01-03 RX ORDER — OXYCODONE AND ACETAMINOPHEN 10; 325 MG/1; MG/1
1 TABLET ORAL EVERY 4 HOURS PRN
Status: ON HOLD | COMMUNITY

## 2023-01-03 RX ORDER — MIDAZOLAM HYDROCHLORIDE 5 MG/ML
INJECTION, SOLUTION INTRAMUSCULAR; INTRAVENOUS
Status: COMPLETED | OUTPATIENT
Start: 2023-01-03 | End: 2023-01-03

## 2023-01-03 RX ORDER — BUPIVACAINE HYDROCHLORIDE 5 MG/ML
30 INJECTION, SOLUTION EPIDURAL; INTRACAUDAL ONCE
Status: COMPLETED | OUTPATIENT
Start: 2023-01-03 | End: 2023-01-03

## 2023-01-03 RX ADMIN — MIDAZOLAM HYDROCHLORIDE 1 MG: 5 INJECTION, SOLUTION INTRAMUSCULAR; INTRAVENOUS at 09:17

## 2023-01-03 RX ADMIN — DIPHENHYDRAMINE HYDROCHLORIDE 50 MG: 50 INJECTION, SOLUTION INTRAMUSCULAR; INTRAVENOUS at 08:52

## 2023-01-03 RX ADMIN — IOPAMIDOL 20 ML: 755 INJECTION, SOLUTION INTRAVENOUS at 09:47

## 2023-01-03 RX ADMIN — FENTANYL CITRATE 50 MCG: 50 INJECTION, SOLUTION INTRAMUSCULAR; INTRAVENOUS at 08:46

## 2023-01-03 RX ADMIN — MIDAZOLAM HYDROCHLORIDE 1 MG: 5 INJECTION, SOLUTION INTRAMUSCULAR; INTRAVENOUS at 08:49

## 2023-01-03 RX ADMIN — CEFAZOLIN 2000 MG: 1 INJECTION, POWDER, FOR SOLUTION INTRAVENOUS at 08:51

## 2023-01-03 RX ADMIN — FENTANYL CITRATE 50 MCG: 50 INJECTION, SOLUTION INTRAMUSCULAR; INTRAVENOUS at 08:49

## 2023-01-03 RX ADMIN — MIDAZOLAM HYDROCHLORIDE 2 MG: 5 INJECTION, SOLUTION INTRAMUSCULAR; INTRAVENOUS at 08:46

## 2023-01-03 RX ADMIN — MIDAZOLAM HYDROCHLORIDE 1 MG: 5 INJECTION, SOLUTION INTRAMUSCULAR; INTRAVENOUS at 09:14

## 2023-01-03 RX ADMIN — FENTANYL CITRATE 50 MCG: 50 INJECTION, SOLUTION INTRAMUSCULAR; INTRAVENOUS at 09:17

## 2023-01-03 RX ADMIN — FENTANYL CITRATE 50 MCG: 50 INJECTION, SOLUTION INTRAMUSCULAR; INTRAVENOUS at 09:14

## 2023-01-03 RX ADMIN — BUPIVACAINE HYDROCHLORIDE 10 ML: 5 INJECTION, SOLUTION EPIDURAL; INTRACAUDAL at 09:46

## 2023-01-03 ASSESSMENT — PAIN - FUNCTIONAL ASSESSMENT: PAIN_FUNCTIONAL_ASSESSMENT: 0-10

## 2023-01-03 ASSESSMENT — PAIN SCALES - GENERAL
PAINLEVEL_OUTOF10: 0
PAINLEVEL_OUTOF10: 0

## 2023-01-03 NOTE — PROGRESS NOTES
Pt received from IR, vitals stable, awake and responds appropriately. Pt tolerating ice chips. Will continue to monitor.

## 2023-01-03 NOTE — BRIEF OP NOTE
Brief Postoperative Note    Jodi Baires  YOB: 1990  [id number]    Pre-operative Diagnosis: left hydronephrosis    Post-operative Diagnosis: Same    Procedure: image guided ureteral stent placement    Anesthesia: Local and Moderate Sedation    Surgeons/Assistants: Tom Haynes MD    Estimated Blood Loss: less than 5    Complications: None    Specimens: Was Not Obtained    Findings: successful placement of left 8F x 26 cm ureteral stent. Mild left hydronephrosis seen with several compression of the distal ureter.     Electronically signed by Tom Haynes MD on 1/3/2023 at 9:29 AM

## 2023-01-03 NOTE — DISCHARGE INSTRUCTIONS
All home medications have been reviewed, questions answered and patient voiced understanding    Your information:  Name: Byron Mcintyre  : 1990    Your instructions:    Covaderm  Sterile, fabric tape with an absorbant pad intergrated into one piece. Covaderm is non-adherent, so it can go directly on an incision. Should be removed in 24-48 hours. Often covered with Tegaderm so it can be briefly wetted. Often used to cover biopsy sites or other small incisions made for procedures done in Interventional Radiology. 1/3/2023     What to do after you leave the hospital:    Recommended diet: {diet:18022}    Recommended activity: Don't lift anything heavier than 5 pounds or use a vacuum  until it's ok with your doctor; Don't drive until your doctor says it's okay; if you ride in a car for more than short trips, stop frequently to stretch your legs; Ask your doctor when you can return to work. This should be within 6 - 8 weeks after surgery, depending on the kind of work you do; Increase your activity gradually, take short walks on a level surface. If you experience any of the following symptoms: blood in your stool, hard stool, or no gas or stool; fever greater than 101; chills; redness, swelling, bleeding,or drainage from your incision; constipation or diarrhea; nausea or vomiting; increased pain please follow up with. The following personal items were collected during your admission and were returned to you:    Belongings  Dental Appliances: None  Vision - Corrective Lenses: None  Hearing Aid: None  Clothing: Pants, Shirt, Socks, Undergarments  Jewelry: None  Body Piercings Removed: N/A  Electronic Devices: None  Weapons (Notify Protective Services/Security): None  Home Medications: None  Valuables Given To: Family (Comment)  Patient approves for provider to speak to responsible person post operatively: Yes      We always want to make sure you have the help you need when you go home.   If you do not feel you have the help you need when you go home then please ask your nurse or discharge planner prior to leaving the hospital.    We thank you for choosing [facility]. We hope that you had a positive experience and that we provided the very best care during your stay.

## 2023-01-03 NOTE — PROGRESS NOTES
Pt discharged home per MD orders. Pt and pt mother given discharge instructions including follow up, wound care, signs of complications with numbers to call with any questions and instructions on when to return to the ED. Pt and pt mother verbalized understanding and state no questions. Pt peripheral IV removed, intact, bleeding controlled. Pt safely transported off unit with all belongings. Transported home via mother.

## 2023-01-03 NOTE — PRE SEDATION
Sedation Pre-Procedure Note    Patient Name: Gianna Ayala   YOB: 1990  Room/Bed: Room/bed info not found  Medical Record Number: [id number]  Date: 1/3/2023   Time: 8:32 AM       Indication:  pt with left hydro due to obstructing pelvic mass here for ureteral stent placement. Consent: I have discussed with the patient and/or the patient representative the indication, alternatives, and the possible risks and/or complications of the planned procedure and the anesthesia methods. The patient and/or patient representative appear to understand and agree to proceed. Vital Signs:   Vitals:    01/03/23 0810   BP: 129/75   Pulse: 90   Resp: 18   Temp: 98.2 °F (36.8 °C)   SpO2: 97%       Past Medical History:   has a past medical history of ADHD (attention deficit hyperactivity disorder), Asthma, Bipolar 1 disorder ([facility] [address] Bipolar disorder ([facility] [address] COVID-19, Depression, Irregular periods, and PONV (postoperative nausea and vomiting). Past Surgical History:   has a past surgical history that includes Stomach surgery (06/2021); [place] tooth extraction; Cervix biopsy (N/A, 10/28/2021); LEEP (11/2021); Total abdominal hysterectomy w/ bilateral salpingoophorectomy (N/A, 3/14/2022); and CT BIOPSY ABDOMEN RETROPERITONEUM (12/7/2022). Medications:   Scheduled Meds:   Continuous Infusions:   PRN Meds:   Home Meds:   Prior to Admission medications    Medication Sig Start Date End Date Taking? Authorizing Provider   tamsulosin (FLOMAX) 0.4 MG capsule Take 0.4 mg by mouth daily   Yes Historical Provider, MD   oxyCODONE-acetaminophen (PERCOCET)  MG per tablet Take 1 tablet by mouth every 4 hours as needed for Pain. Yes Historical Provider, MD   gabapentin (NEURONTIN) 100 MG capsule Take 100 mg by mouth 3 times daily.    Yes Historical Provider, MD   acetaminophen (TYLENOL) 500 MG tablet Take 1 tablet by mouth every 6 hours as needed for Pain 3/18/22   Igor Colvin MD   apixaban (ELIQUIS) 2.5 MG TABS tablet Take 1 tablet by mouth 2 times daily for 22 days 3/18/22 4/9/22  Reg Perdomo MD   ibuprofen (ADVIL;MOTRIN) 600 MG tablet Take 1 tablet by mouth every 6 hours 3/18/22   Reg Perdomo MD   senna (SENOKOT) 8.6 MG tablet Take 2 tablets by mouth nightly as needed for Constipation 3/18/22 3/18/23  Reg Perdomo MD   Calcium Citrate-Vitamin D (CITRACAL + D PO) Take by mouth    Historical Provider, MD   Multiple Vitamin (MVI, BARIATRIC ADVANTAGE VITABAND, CHEW TAB) Take 1 tablet by mouth daily    Historical Provider, MD   budesonide-formoterol (SYMBICORT) 160-4.5 MCG/ACT AERO INHALE 2 PUFFS BY MOUTH INTO THE LUNGS TWO TIMES A DAY 12/22/20   Historical Provider, MD   albuterol sulfate  (90 Base) MCG/ACT inhaler INHALE 2 PUFFS INTO THE LUNGS EVERY 4 TO 6 HOURS AS NEEDED 2/1/21   Historical Provider, MD   loratadine (CLARITIN) 10 MG tablet Take 10 mg by mouth daily    Historical Provider, MD   traZODone (DESYREL) 50 MG tablet Take 50 mg by mouth nightly    Historical Provider, MD   albuterol (PROVENTIL) (2.5 MG/3ML) 0.083% nebulizer solution Take 2.5 mg by nebulization every 6 hours as needed for Wheezing    Historical Provider, MD   omeprazole (PRILOSEC) 10 MG capsule Take 10 mg by mouth in the morning and at bedtime     Historical Provider, MD   mometasone (NASONEX) 50 MCG/ACT nasal spray 2 sprays by Nasal route daily    Historical Provider, MD     Coumadin Use Last 7 Days:  no  Antiplatelet drug therapy use last 7 days: no  Other anticoagulant use last 7 days: no  Additional Medication Information:  none     Pre-Sedation Documentation and Exam:   I have reviewed the patient's history and review of systems.     Mallampati Airway Assessment:  normal    Prior History of Anesthesia Complications:   none    ASA Classification:  Class 3 - A patient with severe systemic disease that limits activity but is not incapacitating    Sedation/ Anesthesia Plan:   intravenous sedation    Medications Planned:   midazolam (Versed) intravenously and fentanyl intravenously    Patient is an appropriate candidate for plan of sedation: yes    Electronically signed by Manny Pruitt MD on 1/3/2023 at 8:32 AM

## 2023-01-05 ENCOUNTER — APPOINTMENT (OUTPATIENT)
Dept: CT IMAGING | Age: 33
DRG: 463 | End: 2023-01-05
Payer: COMMERCIAL

## 2023-01-05 ENCOUNTER — HOSPITAL ENCOUNTER (INPATIENT)
Age: 33
LOS: 5 days | Discharge: HOME | DRG: 463 | End: 2023-01-10
Attending: EMERGENCY MEDICINE | Admitting: INTERNAL MEDICINE
Payer: COMMERCIAL

## 2023-01-05 DIAGNOSIS — C79.60 MALIGNANT NEOPLASM METASTATIC TO OVARY, UNSPECIFIED LATERALITY (HCC): ICD-10-CM

## 2023-01-05 DIAGNOSIS — N12 PYELONEPHRITIS: Primary | ICD-10-CM

## 2023-01-05 DIAGNOSIS — Z51.5 HOSPICE CARE PATIENT: ICD-10-CM

## 2023-01-05 PROBLEM — F31.60 MIXED BIPOLAR DISORDER (HCC): Status: ACTIVE | Noted: 2018-12-10

## 2023-01-05 PROBLEM — F41.1 GENERALIZED ANXIETY DISORDER: Status: ACTIVE | Noted: 2023-01-05

## 2023-01-05 PROBLEM — J45.909 ASTHMA: Status: ACTIVE | Noted: 2023-01-05

## 2023-01-05 PROBLEM — D64.9 ANEMIA: Status: ACTIVE | Noted: 2023-01-05

## 2023-01-05 PROBLEM — Z90.710 HISTORY OF TOTAL HYSTERECTOMY: Status: ACTIVE | Noted: 2022-03-14

## 2023-01-05 PROBLEM — J45.41 MODERATE PERSISTENT ASTHMA WITH EXACERBATION: Status: ACTIVE | Noted: 2023-01-05

## 2023-01-05 PROBLEM — K59.03 DRUG-INDUCED CONSTIPATION: Status: ACTIVE | Noted: 2023-01-05

## 2023-01-05 PROBLEM — N39.0 UTI (URINARY TRACT INFECTION): Status: ACTIVE | Noted: 2023-01-05

## 2023-01-05 LAB
A/G RATIO: 1.4 (ref 1.1–2.2)
ALBUMIN SERPL-MCNC: 4.2 G/DL (ref 3.4–5)
ALP BLD-CCNC: 58 U/L (ref 40–129)
ALT SERPL-CCNC: 9 U/L (ref 10–40)
ANION GAP SERPL CALCULATED.3IONS-SCNC: 7 MMOL/L (ref 3–16)
AST SERPL-CCNC: 15 U/L (ref 15–37)
BACTERIA: ABNORMAL /HPF
BASOPHILS ABSOLUTE: 0 K/UL (ref 0–0.2)
BASOPHILS RELATIVE PERCENT: 0.4 %
BILIRUB SERPL-MCNC: 0.5 MG/DL (ref 0–1)
BILIRUBIN URINE: ABNORMAL
BLOOD, URINE: ABNORMAL
BUN BLDV-MCNC: 10 MG/DL (ref 7–20)
CALCIUM SERPL-MCNC: 9.5 MG/DL (ref 8.3–10.6)
CHLORIDE BLD-SCNC: 97 MMOL/L (ref 99–110)
CLARITY: ABNORMAL
CO2: 28 MMOL/L (ref 21–32)
COLOR: ABNORMAL
CREAT SERPL-MCNC: 0.9 MG/DL (ref 0.6–1.1)
EOSINOPHILS ABSOLUTE: 1.3 K/UL (ref 0–0.6)
EOSINOPHILS RELATIVE PERCENT: 15.7 %
EPITHELIAL CELLS, UA: 10 /HPF (ref 0–5)
GFR SERPL CREATININE-BSD FRML MDRD: >60 ML/MIN/{1.73_M2}
GLUCOSE BLD-MCNC: 104 MG/DL (ref 70–99)
GLUCOSE URINE: NEGATIVE MG/DL
HCG QUALITATIVE: NEGATIVE
HCT VFR BLD CALC: 38.4 % (ref 36–48)
HEMOGLOBIN: 12.7 G/DL (ref 12–16)
HYALINE CASTS: 1 /LPF (ref 0–8)
KETONES, URINE: NEGATIVE MG/DL
LACTIC ACID, SEPSIS: 0.9 MMOL/L (ref 0.4–1.9)
LACTIC ACID, SEPSIS: 1.1 MMOL/L (ref 0.4–1.9)
LEUKOCYTE ESTERASE, URINE: ABNORMAL
LIPASE: 11 U/L (ref 13–60)
LYMPHOCYTES ABSOLUTE: 1.5 K/UL (ref 1–5.1)
LYMPHOCYTES RELATIVE PERCENT: 18.3 %
MCH RBC QN AUTO: 30.2 PG (ref 26–34)
MCHC RBC AUTO-ENTMCNC: 33.1 G/DL (ref 31–36)
MCV RBC AUTO: 91 FL (ref 80–100)
MICROSCOPIC EXAMINATION: YES
MONOCYTES ABSOLUTE: 0.5 K/UL (ref 0–1.3)
MONOCYTES RELATIVE PERCENT: 5.8 %
NEUTROPHILS ABSOLUTE: 5 K/UL (ref 1.7–7.7)
NEUTROPHILS RELATIVE PERCENT: 59.8 %
NITRITE, URINE: NEGATIVE
PDW BLD-RTO: 12.1 % (ref 12.4–15.4)
PH UA: 6.5 (ref 5–8)
PLATELET # BLD: 301 K/UL (ref 135–450)
PMV BLD AUTO: 8.6 FL (ref 5–10.5)
POTASSIUM SERPL-SCNC: 3.8 MMOL/L (ref 3.5–5.1)
PROTEIN UA: 300 MG/DL
RBC # BLD: 4.22 M/UL (ref 4–5.2)
RBC UA: 1356 /HPF (ref 0–4)
SODIUM BLD-SCNC: 132 MMOL/L (ref 136–145)
SPECIFIC GRAVITY UA: 1.01 (ref 1–1.03)
TOTAL PROTEIN: 7.3 G/DL (ref 6.4–8.2)
URINE REFLEX TO CULTURE: YES
URINE TYPE: ABNORMAL
UROBILINOGEN, URINE: 1 E.U./DL
WBC # BLD: 8.3 K/UL (ref 4–11)
WBC UA: 71 /HPF (ref 0–5)

## 2023-01-05 PROCEDURE — 81001 URINALYSIS AUTO W/SCOPE: CPT

## 2023-01-05 PROCEDURE — 6370000000 HC RX 637 (ALT 250 FOR IP): Performed by: INTERNAL MEDICINE

## 2023-01-05 PROCEDURE — 6370000000 HC RX 637 (ALT 250 FOR IP): Performed by: EMERGENCY MEDICINE

## 2023-01-05 PROCEDURE — 6360000002 HC RX W HCPCS: Performed by: EMERGENCY MEDICINE

## 2023-01-05 PROCEDURE — 80053 COMPREHEN METABOLIC PANEL: CPT

## 2023-01-05 PROCEDURE — 87040 BLOOD CULTURE FOR BACTERIA: CPT

## 2023-01-05 PROCEDURE — 99285 EMERGENCY DEPT VISIT HI MDM: CPT

## 2023-01-05 PROCEDURE — 36415 COLL VENOUS BLD VENIPUNCTURE: CPT

## 2023-01-05 PROCEDURE — 84703 CHORIONIC GONADOTROPIN ASSAY: CPT

## 2023-01-05 PROCEDURE — 96365 THER/PROPH/DIAG IV INF INIT: CPT

## 2023-01-05 PROCEDURE — 96375 TX/PRO/DX INJ NEW DRUG ADDON: CPT

## 2023-01-05 PROCEDURE — 96361 HYDRATE IV INFUSION ADD-ON: CPT

## 2023-01-05 PROCEDURE — 83690 ASSAY OF LIPASE: CPT

## 2023-01-05 PROCEDURE — 2580000003 HC RX 258: Performed by: INTERNAL MEDICINE

## 2023-01-05 PROCEDURE — 6360000002 HC RX W HCPCS: Performed by: INTERNAL MEDICINE

## 2023-01-05 PROCEDURE — 74176 CT ABD & PELVIS W/O CONTRAST: CPT

## 2023-01-05 PROCEDURE — 96376 TX/PRO/DX INJ SAME DRUG ADON: CPT

## 2023-01-05 PROCEDURE — 1200000000 HC SEMI PRIVATE

## 2023-01-05 PROCEDURE — 85025 COMPLETE CBC W/AUTO DIFF WBC: CPT

## 2023-01-05 PROCEDURE — 6360000002 HC RX W HCPCS: Performed by: PHYSICIAN ASSISTANT

## 2023-01-05 PROCEDURE — 87086 URINE CULTURE/COLONY COUNT: CPT

## 2023-01-05 PROCEDURE — 2580000003 HC RX 258: Performed by: PHYSICIAN ASSISTANT

## 2023-01-05 PROCEDURE — 83605 ASSAY OF LACTIC ACID: CPT

## 2023-01-05 RX ORDER — MORPHINE SULFATE 4 MG/ML
4 INJECTION, SOLUTION INTRAMUSCULAR; INTRAVENOUS ONCE
Status: COMPLETED | OUTPATIENT
Start: 2023-01-05 | End: 2023-01-05

## 2023-01-05 RX ORDER — CETIRIZINE HYDROCHLORIDE 10 MG/1
5 TABLET ORAL DAILY
Status: DISCONTINUED | OUTPATIENT
Start: 2023-01-06 | End: 2023-01-10 | Stop reason: HOSPADM

## 2023-01-05 RX ORDER — FLUTICASONE PROPIONATE 50 MCG
2 SPRAY, SUSPENSION (ML) NASAL DAILY
Status: DISCONTINUED | OUTPATIENT
Start: 2023-01-06 | End: 2023-01-10 | Stop reason: HOSPADM

## 2023-01-05 RX ORDER — ALBUTEROL SULFATE 90 UG/1
2 AEROSOL, METERED RESPIRATORY (INHALATION) 4 TIMES DAILY
Status: DISCONTINUED | OUTPATIENT
Start: 2023-01-05 | End: 2023-01-07

## 2023-01-05 RX ORDER — PHENAZOPYRIDINE HYDROCHLORIDE 100 MG/1
200 TABLET, FILM COATED ORAL
Status: DISCONTINUED | OUTPATIENT
Start: 2023-01-06 | End: 2023-01-10 | Stop reason: HOSPADM

## 2023-01-05 RX ORDER — MORPHINE SULFATE 4 MG/ML
4 INJECTION, SOLUTION INTRAMUSCULAR; INTRAVENOUS
Status: DISCONTINUED | OUTPATIENT
Start: 2023-01-05 | End: 2023-01-10 | Stop reason: HOSPADM

## 2023-01-05 RX ORDER — ACETAMINOPHEN 500 MG
500 TABLET ORAL EVERY 6 HOURS PRN
Status: DISCONTINUED | OUTPATIENT
Start: 2023-01-05 | End: 2023-01-10 | Stop reason: HOSPADM

## 2023-01-05 RX ORDER — 0.9 % SODIUM CHLORIDE 0.9 %
1000 INTRAVENOUS SOLUTION INTRAVENOUS ONCE
Status: COMPLETED | OUTPATIENT
Start: 2023-01-05 | End: 2023-01-05

## 2023-01-05 RX ORDER — TRAZODONE HYDROCHLORIDE 50 MG/1
50 TABLET ORAL NIGHTLY
Status: DISCONTINUED | OUTPATIENT
Start: 2023-01-05 | End: 2023-01-10 | Stop reason: HOSPADM

## 2023-01-05 RX ORDER — SENNA PLUS 8.6 MG/1
2 TABLET ORAL 2 TIMES DAILY
Status: DISCONTINUED | OUTPATIENT
Start: 2023-01-05 | End: 2023-01-10 | Stop reason: HOSPADM

## 2023-01-05 RX ORDER — LORAZEPAM 2 MG/ML
1 INJECTION INTRAMUSCULAR EVERY 4 HOURS PRN
Status: DISCONTINUED | OUTPATIENT
Start: 2023-01-05 | End: 2023-01-10 | Stop reason: HOSPADM

## 2023-01-05 RX ORDER — POLYETHYLENE GLYCOL 3350 17 G/17G
17 POWDER, FOR SOLUTION ORAL DAILY PRN
Status: DISCONTINUED | OUTPATIENT
Start: 2023-01-05 | End: 2023-01-06

## 2023-01-05 RX ORDER — PANTOPRAZOLE SODIUM 40 MG/1
40 TABLET, DELAYED RELEASE ORAL
Status: DISCONTINUED | OUTPATIENT
Start: 2023-01-06 | End: 2023-01-05

## 2023-01-05 RX ORDER — MORPHINE SULFATE 4 MG/ML
4 INJECTION, SOLUTION INTRAMUSCULAR; INTRAVENOUS
Status: DISCONTINUED | OUTPATIENT
Start: 2023-01-05 | End: 2023-01-05

## 2023-01-05 RX ORDER — HYDROMORPHONE HYDROCHLORIDE 1 MG/ML
1 INJECTION, SOLUTION INTRAMUSCULAR; INTRAVENOUS; SUBCUTANEOUS ONCE
Status: COMPLETED | OUTPATIENT
Start: 2023-01-05 | End: 2023-01-05

## 2023-01-05 RX ORDER — TAMSULOSIN HYDROCHLORIDE 0.4 MG/1
0.4 CAPSULE ORAL DAILY
Status: DISCONTINUED | OUTPATIENT
Start: 2023-01-06 | End: 2023-01-10 | Stop reason: HOSPADM

## 2023-01-05 RX ORDER — ONDANSETRON 2 MG/ML
4 INJECTION INTRAMUSCULAR; INTRAVENOUS ONCE
Status: COMPLETED | OUTPATIENT
Start: 2023-01-05 | End: 2023-01-05

## 2023-01-05 RX ORDER — ALBUTEROL SULFATE 2.5 MG/3ML
2.5 SOLUTION RESPIRATORY (INHALATION) EVERY 6 HOURS PRN
Status: DISCONTINUED | OUTPATIENT
Start: 2023-01-05 | End: 2023-01-10 | Stop reason: HOSPADM

## 2023-01-05 RX ORDER — ENOXAPARIN SODIUM 100 MG/ML
40 INJECTION SUBCUTANEOUS DAILY
Status: DISCONTINUED | OUTPATIENT
Start: 2023-01-06 | End: 2023-01-10 | Stop reason: HOSPADM

## 2023-01-05 RX ORDER — LORAZEPAM 1 MG/1
1 TABLET ORAL ONCE
Status: COMPLETED | OUTPATIENT
Start: 2023-01-05 | End: 2023-01-05

## 2023-01-05 RX ORDER — ENOXAPARIN SODIUM 100 MG/ML
40 INJECTION SUBCUTANEOUS DAILY
Status: DISCONTINUED | OUTPATIENT
Start: 2023-01-05 | End: 2023-01-05

## 2023-01-05 RX ORDER — PANTOPRAZOLE SODIUM 40 MG/1
40 TABLET, DELAYED RELEASE ORAL
Status: DISCONTINUED | OUTPATIENT
Start: 2023-01-05 | End: 2023-01-10 | Stop reason: HOSPADM

## 2023-01-05 RX ORDER — OXYCODONE AND ACETAMINOPHEN 10; 325 MG/1; MG/1
1 TABLET ORAL EVERY 4 HOURS PRN
Status: DISCONTINUED | OUTPATIENT
Start: 2023-01-05 | End: 2023-01-06

## 2023-01-05 RX ORDER — GABAPENTIN 300 MG/1
300 CAPSULE ORAL NIGHTLY
Status: DISCONTINUED | OUTPATIENT
Start: 2023-01-05 | End: 2023-01-10 | Stop reason: HOSPADM

## 2023-01-05 RX ORDER — IBUPROFEN 600 MG/1
600 TABLET ORAL EVERY 6 HOURS
Status: DISCONTINUED | OUTPATIENT
Start: 2023-01-05 | End: 2023-01-05 | Stop reason: ALTCHOICE

## 2023-01-05 RX ORDER — ONDANSETRON 2 MG/ML
4 INJECTION INTRAMUSCULAR; INTRAVENOUS EVERY 6 HOURS PRN
Status: DISCONTINUED | OUTPATIENT
Start: 2023-01-05 | End: 2023-01-10 | Stop reason: HOSPADM

## 2023-01-05 RX ADMIN — SODIUM CHLORIDE 1000 ML: 9 INJECTION, SOLUTION INTRAVENOUS at 13:15

## 2023-01-05 RX ADMIN — SENNOSIDES 17.2 MG: 8.6 TABLET, FILM COATED ORAL at 21:54

## 2023-01-05 RX ADMIN — MORPHINE SULFATE 4 MG: 4 INJECTION, SOLUTION INTRAMUSCULAR; INTRAVENOUS at 13:53

## 2023-01-05 RX ADMIN — HYDROMORPHONE HYDROCHLORIDE 1 MG: 1 INJECTION, SOLUTION INTRAMUSCULAR; INTRAVENOUS; SUBCUTANEOUS at 15:33

## 2023-01-05 RX ADMIN — LORAZEPAM 1 MG: 1 TABLET ORAL at 18:44

## 2023-01-05 RX ADMIN — OXYCODONE AND ACETAMINOPHEN 1 TABLET: 10; 325 TABLET ORAL at 23:14

## 2023-01-05 RX ADMIN — POLYETHYLENE GLYCOL 3350 17 G: 17 POWDER, FOR SOLUTION ORAL at 23:59

## 2023-01-05 RX ADMIN — PIPERACILLIN AND TAZOBACTAM 4500 MG: 4; .5 INJECTION, POWDER, FOR SOLUTION INTRAVENOUS at 15:18

## 2023-01-05 RX ADMIN — MORPHINE SULFATE 4 MG: 4 INJECTION, SOLUTION INTRAMUSCULAR; INTRAVENOUS at 11:54

## 2023-01-05 RX ADMIN — TRAZODONE HYDROCHLORIDE 50 MG: 50 TABLET ORAL at 21:54

## 2023-01-05 RX ADMIN — CEFTRIAXONE SODIUM 1000 MG: 1 INJECTION, POWDER, FOR SOLUTION INTRAMUSCULAR; INTRAVENOUS at 22:02

## 2023-01-05 RX ADMIN — ONDANSETRON 4 MG: 2 INJECTION INTRAMUSCULAR; INTRAVENOUS at 11:55

## 2023-01-05 RX ADMIN — ENOXAPARIN SODIUM 40 MG: 100 INJECTION SUBCUTANEOUS at 16:55

## 2023-01-05 RX ADMIN — PHENAZOPYRIDINE 200 MG: 100 TABLET ORAL at 23:59

## 2023-01-05 RX ADMIN — MORPHINE SULFATE 4 MG: 4 INJECTION, SOLUTION INTRAMUSCULAR; INTRAVENOUS at 21:53

## 2023-01-05 RX ADMIN — PANTOPRAZOLE SODIUM 40 MG: 40 TABLET, DELAYED RELEASE ORAL at 23:14

## 2023-01-05 RX ADMIN — GABAPENTIN 300 MG: 300 CAPSULE ORAL at 21:54

## 2023-01-05 RX ADMIN — MORPHINE SULFATE 4 MG: 4 INJECTION, SOLUTION INTRAMUSCULAR; INTRAVENOUS at 23:59

## 2023-01-05 RX ADMIN — MORPHINE SULFATE 4 MG: 4 INJECTION, SOLUTION INTRAMUSCULAR; INTRAVENOUS at 18:50

## 2023-01-05 ASSESSMENT — PAIN DESCRIPTION - LOCATION
LOCATION: PELVIS
LOCATION: ABDOMEN
LOCATION: FLANK;BACK
LOCATION: PELVIS
LOCATION: FLANK;GENERALIZED
LOCATION: ABDOMEN
LOCATION: PELVIS
LOCATION: FLANK

## 2023-01-05 ASSESSMENT — PAIN DESCRIPTION - ORIENTATION
ORIENTATION: LEFT
ORIENTATION: LOWER
ORIENTATION: LOWER
ORIENTATION: LEFT
ORIENTATION: LEFT

## 2023-01-05 ASSESSMENT — PAIN SCALES - GENERAL
PAINLEVEL_OUTOF10: 10
PAINLEVEL_OUTOF10: 8
PAINLEVEL_OUTOF10: 5
PAINLEVEL_OUTOF10: 10
PAINLEVEL_OUTOF10: 8
PAINLEVEL_OUTOF10: 6
PAINLEVEL_OUTOF10: 5
PAINLEVEL_OUTOF10: 7

## 2023-01-05 ASSESSMENT — ENCOUNTER SYMPTOMS
DIARRHEA: 0
ABDOMINAL PAIN: 1
COUGH: 0
SHORTNESS OF BREATH: 0
RHINORRHEA: 0
NAUSEA: 1
VOMITING: 1
WHEEZING: 0

## 2023-01-05 ASSESSMENT — PAIN - FUNCTIONAL ASSESSMENT
PAIN_FUNCTIONAL_ASSESSMENT: ACTIVITIES ARE NOT PREVENTED
PAIN_FUNCTIONAL_ASSESSMENT: 0-10
PAIN_FUNCTIONAL_ASSESSMENT: ACTIVITIES ARE NOT PREVENTED

## 2023-01-05 ASSESSMENT — PAIN DESCRIPTION - DESCRIPTORS
DESCRIPTORS: ACHING;DULL;STABBING
DESCRIPTORS: ACHING;THROBBING;STABBING
DESCRIPTORS: TENDER;SORE
DESCRIPTORS: ACHING;SHARP;SHOOTING

## 2023-01-05 ASSESSMENT — PAIN DESCRIPTION - PAIN TYPE: TYPE: ACUTE PAIN

## 2023-01-05 NOTE — ED PROVIDER NOTES
In addition to the advanced practice provider, I personally saw Lan Juarez and performed a substantive portion of the visit including all aspects of the medical decision making. Briefly, this is a 29 y.o. female here for low back and abdominal pain. Patient with history of cervical cancer, she reports chronic pain in her abdomen however significantly worsened over the past couple days and now moving into her left flank and low back. Pain not relieved with her home pain medication regimen. Associated with nausea. Patient underwent IR placement of left ureteral stent on 1/3/2023 due to compressive effect on ureter from her known malignancy. On exam, patient afebrile and nontoxic. She appears uncomfortable however in no melissa painful or respiratory distress. Heart RRR. Lungs CTAB. Abdomen soft, nondistended, tender to palpation in suprapubic region and left lower abdomen. Exquisite left CVA and left lumbar paraspinal tenderness. No right CVA tenderness. No midline lumbar or thoracic tenderness. No rebound, no rigidity, no guarding. Screenings          Is this patient to be included in the SEP-1 Core Measure due to severe sepsis or septic shock? No   Exclusion criteria - the patient is NOT to be included for SEP-1 Core Measure due to:  May have criteria for sepsis, but does not meet criteria for severe sepsis or septic shock      MDM    Patient afebrile and nontoxic. Uncomfortable, but no melissa painful distress. AVSS. No peritoneal signs on my abdominal exam, no focal findings to suggest acute appendicitis or cholecystitis and my clinical suspicion is very low. Pregnancy negative. Remainder of laboratory work-up is reassuring without evidence of endorgan dysfunction or clinically significant electrolyte derangement. CT imaging reveals no evidence of bowel perforation, obstruction, intra-abdominal abscess or ureteral stone.   There appears radiographically to be appropriate placement of left ureteral stent, however there is air in the renal pelvis and bladder. Unclear if related to infection, fistulization or suspected sequela of recent stent placement. Case was discussed with urology, they are felt unlikely due to fistula or surgical procedure and concerning for infection. Urinalysis does demonstrate hematuria with elevated WBC count, will treat empirically for pyelonephritis. Patient is not septic. Also received IV fluids and morphine and later Dilaudid for pain control. Case discussed with internal medicine team and will admit for further evaluation and care. Patient alert, hemodynamically stable and in no distress at time of admission. I Dr. Hanny Pina am the primary clinician of record. Patient Referrals:  No follow-up provider specified. Discharge Medications:  New Prescriptions    No medications on file       FINAL IMPRESSION  1. Pyelonephritis        Blood pressure 109/80, pulse 79, temperature 97.5 °F (36.4 °C), temperature source Oral, resp. rate 16, height 5' 7\" (1.702 m), weight 184 lb 14.4 oz (83.9 kg), SpO2 94 %, not currently breastfeeding. For further details of [facility] emergency department encounter, please see documentation by advanced practice provider, Kayla Carcamo.     Toby Zapata DO (electronically signed)  Attending Emergency Physician       Toby Zapata DO  01/05/23 5631

## 2023-01-05 NOTE — ED PROVIDER NOTES
[facility]        Pt Name: Maria Del Rosario Tan  MRN: [id number]  [place] 1990  Date of evaluation: 1/5/2023  Provider: Edson Mosher PA-C  PCP: Kellie Wayne MD  Note Started: 12:08 PM EST 1/5/23       I have seen and evaluated this patient with my supervising physician Mary Kate Munoz, [address]       Chief Complaint   Patient presents with    Other     Pt had stent placed in left ureter on 1/3/2023. Pt started having pain 0300 on Wednesday, 10/10 of back pain in left back all over. N/V unable to keep anything down since 1/4/23. Has cancerous mass pressing on kidney. From home. HISTORY OF PRESENT ILLNESS: 1 or more Elements     History From: patient  Limitations to history : None    Maria Del Rosario Tan is a 29 y.o. female who presents for evaluation of severe left-sided back pain and lower abdominal pain with intractable nausea and vomiting. States that she has not been able to keep anything down since yesterday. Patient does have a history of metastatic cervical cancer and had a ureteral stent placed 2 days ago by IR. She sees Dr. Kathleen Mya through urology. She denies any urinary complaints. No diarrhea or bloody stools. No known sick contacts with similar symptoms. No chest pain or shortness of breath. No other complaints or concerns at this time. Nursing Notes were all reviewed and agreed with or any disagreements were addressed in the HPI. REVIEW OF SYSTEMS :      Review of Systems   Constitutional:  Negative for appetite change, chills and fever. HENT:  Negative for congestion and rhinorrhea. Respiratory:  Negative for cough, shortness of breath and wheezing. Cardiovascular:  Negative for chest pain. Gastrointestinal:  Positive for abdominal pain, nausea and vomiting. Negative for diarrhea. Genitourinary:  Negative for difficulty urinating, dysuria and hematuria.    Musculoskeletal:  Negative for neck pain and neck stiffness. Skin:  Negative for rash. Neurological:  Negative for headaches. Positives and Pertinent negatives as per HPI. SURGICAL HISTORY     Past Surgical History:   Procedure Laterality Date    CERVIX BIOPSY N/A 10/28/2021    CERVICAL BIOPSIES,  LOOP EXCISIONAL ELECTROCAUTERY PROCEDURE performed by Neo Enrique MD at [address] [place] [address] Road  12/7/2022    CT BIOPSY ABDOMEN RETROPERITONEUM 12/7/2022 [facility] CT SCAN    IR URETERAL STENT PLACEMENT THRU EXIST TRACT  1/3/2023    IR URETERAL STENT PLACEMENT THRU EXIST TRACT 1/3/2023 [facility] SPECIAL PROCEDURES    LEEP  11/2021    STOMACH SURGERY  06/2021    sleeve gastrectomy    HERVE AND BSO (CERVIX REMOVED) N/A 3/14/2022    ABDOMINAL RADICAL HYSTERECTOMY, BILATERAL SALPINGECTOMY, SENTINEL LYMPH NODE BIOPSY, PELVIC LYMPH NODE DISSECTION, OVARIAN TRANSPOSITION (39167, [id number], [id number]) performed by Neo Enrique MD at [place] [address]       Previous Medications    ACETAMINOPHEN (TYLENOL) 500 MG TABLET    Take 1 tablet by mouth every 6 hours as needed for Pain    ALBUTEROL (PROVENTIL) (2.5 MG/3ML) 0.083% NEBULIZER SOLUTION    Take 2.5 mg by nebulization every 6 hours as needed for Wheezing    ALBUTEROL SULFATE  (90 BASE) MCG/ACT INHALER    INHALE 2 PUFFS INTO THE LUNGS EVERY 4 TO 6 HOURS AS NEEDED    APIXABAN (ELIQUIS) 2.5 MG TABS TABLET    Take 1 tablet by mouth 2 times daily for 22 days    BUDESONIDE-FORMOTEROL (SYMBICORT) 160-4.5 MCG/ACT AERO    INHALE 2 PUFFS BY MOUTH INTO THE LUNGS TWO TIMES A DAY    CALCIUM CITRATE-VITAMIN D (CITRACAL + D PO)    Take by mouth    GABAPENTIN (NEURONTIN) 100 MG CAPSULE    Take 100 mg by mouth 3 times daily.     IBUPROFEN (ADVIL;MOTRIN) 600 MG TABLET    Take 1 tablet by mouth every 6 hours    LORATADINE (CLARITIN) 10 MG TABLET    Take 10 mg by mouth daily    MOMETASONE (NASONEX) 50 MCG/ACT NASAL SPRAY    2 sprays by Nasal route daily MULTIPLE VITAMIN (MVI, BARIATRIC ADVANTAGE VITABAND, CHEW TAB)    Take 1 tablet by mouth daily    OMEPRAZOLE (PRILOSEC) 10 MG CAPSULE    Take 10 mg by mouth in the morning and at bedtime     OXYCODONE-ACETAMINOPHEN (PERCOCET)  MG PER TABLET    Take 1 tablet by mouth every 4 hours as needed for Pain. SENNA (SENOKOT) 8.6 MG TABLET    Take 2 tablets by mouth nightly as needed for Constipation    TAMSULOSIN (FLOMAX) 0.4 MG CAPSULE    Take 0.4 mg by mouth daily    TRAZODONE (DESYREL) 50 MG TABLET    Take 50 mg by mouth nightly       ALLERGIES     Other    FAMILYHISTORY       Family History   Problem Relation Age of Onset    Cancer Mother         skin    High Blood Pressure Father         SOCIAL HISTORY       Social History     Tobacco Use    Smoking status: Never    Smokeless tobacco: Never   Vaping Use    Vaping Use: Never used   Substance Use Topics    Alcohol use: Yes     Alcohol/week: 3.0 standard drinks     Types: 3 Glasses of wine per week     Comment: social/ weekly    Drug use: Yes     Types: Marijuana Edison Bowman     Comment: medical card, edibles daily       SCREENINGS                         CIWA Assessment  BP: (!) 110/55  Heart Rate: 80           PHYSICAL EXAM  1 or more Elements     ED Triage Vitals [01/05/23 1130]   BP Temp Temp Source Heart Rate Resp SpO2 Height Weight   111/67 97.5 °F (36.4 °C) Oral (!) 110 20 97 % 5' 7\" (1.702 m) 184 lb 14.4 oz (83.9 kg)       Physical Exam  Vitals and nursing note reviewed. Constitutional:       General: She is not in acute distress. Appearance: She is well-developed. She is ill-appearing. She is not toxic-appearing or diaphoretic. HENT:      Head: Normocephalic and atraumatic. Right Ear: External ear normal.      Left Ear: External ear normal.      Nose: Nose normal.   Eyes:      General:         Right eye: No discharge. Left eye: No discharge. Cardiovascular:      Rate and Rhythm: Regular rhythm. Tachycardia present.       Heart sounds: Normal heart sounds. Pulmonary:      Effort: Pulmonary effort is normal. No respiratory distress. Breath sounds: Normal breath sounds. Chest:      Chest wall: No tenderness. Abdominal:      General: Bowel sounds are normal. There is no distension. Palpations: Abdomen is soft. Tenderness: There is generalized abdominal tenderness and tenderness in the periumbilical area and suprapubic area. There is no guarding or rebound. Musculoskeletal:         General: Normal range of motion. Cervical back: Normal range of motion and neck supple. Skin:     General: Skin is warm and dry. Neurological:      Mental Status: She is alert and oriented to person, place, and time. Psychiatric:         Behavior: Behavior normal.       Generalized mid to lower abdominal tenderness. No peritoneal signs.     DIAGNOSTIC RESULTS   LABS:    Labs Reviewed   CBC WITH AUTO DIFFERENTIAL - Abnormal; Notable for the following components:       Result Value    RDW 12.1 (*)     Eosinophils Absolute 1.3 (*)     All other components within normal limits   COMPREHENSIVE METABOLIC PANEL - Abnormal; Notable for the following components:    Sodium 132 (*)     Chloride 97 (*)     Glucose 104 (*)     ALT 9 (*)     All other components within normal limits   LIPASE - Abnormal; Notable for the following components:    Lipase 11.0 (*)     All other components within normal limits   URINALYSIS WITH REFLEX TO CULTURE - Abnormal; Notable for the following components:    Color, UA DARK YELLOW (*)     Clarity, UA TURBID (*)     Bilirubin Urine SMALL (*)     Blood, Urine LARGE (*)     Leukocyte Esterase, Urine LARGE (*)     All other components within normal limits   MICROSCOPIC URINALYSIS - Abnormal; Notable for the following components:    Bacteria, UA 1+ (*)     WBC, UA 71 (*)     RBC, UA 1356 (*)     Epithelial Cells, UA 10 (*)     All other components within normal limits   CULTURE, BLOOD 1   CULTURE, BLOOD 2   CULTURE, URINE HCG, SERUM, QUALITATIVE   LACTATE, SEPSIS   LACTATE, SEPSIS       When ordered only abnormal lab results are displayed. All other labs were within normal range or not returned as of this dictation. EKG: When ordered, EKG's are interpreted by the Emergency Department Physician in the absence of a cardiologist.  Please see their note for interpretation of EKG. RADIOLOGY:   Non-plain film images such as CT, Ultrasound and MRI are read by the radiologist. Plain radiographic images are visualized and preliminarily interpreted by the ED Provider with the below findings:    Air within the left renal pelvis and urinary bladder. Interpretation per the Radiologist below, if available at the time of this note:    CT ABDOMEN PELVIS WO CONTRAST Additional Contrast? None   Final Result   1. Interval enlargement of the previously identified left pelvic soft tissue   mass. 2.  There is a left ureteral stent in place. There is no evidence of   hydronephrosis on the left. However, there is a small amount of air within   the left renal pelvis. There is also a small amount of air within the   urinary bladder. This could be secondary to recent instrumentation. However, if there is no recent history of instrumentation then this could   represent a bladder/vaginal fistula possibly related to the left pelvic mass   which abuts the vagina or possibly a fistula between the urinary bladder and   adjacent sigmoid colon.            IR GUIDED URETERAL STENT PLACE THRU EXIST TRACT    Result Date: 1/3/2023  PROCEDURE: INTRO URETERAL CATH/STENT MODERATE CONSCIOUS SEDATION 1/3/2023 HISTORY: ORDERING SYSTEM PROVIDED HISTORY: Hydronephrosis, unspecified hydronephrosis type TECHNOLOGIST PROVIDED HISTORY: Is the patient pregnant?->No TECHNIQUE: Ultrasound and fluoroscopic guidance CONTRAST: 20 cc of contrast SEDATION: Moderate sedation was provided by the interventional radiology nurse under the supervision of the Interventional radiologist for 45 minutes. 50 mg Benadryl, 5 mg Versed, and 200 mcg fentanyl was administered. FLUOROSCOPY DOSE AND TYPE OR TIME AND EXPOSURES: Time: 7.5 minutes Dose: 54.75 mGy Estimated blood loss: Less than 5 cc DESCRIPTION OF PROCEDURE: Informed consent was obtained after a detailed explanation of the procedure including risks, benefits, and alternatives. Universal protocol was observed. Sterile gowns, masks, hats and gloves utilized for maximal sterile barrier. Patient was placed in the prone position on the fluoroscopy table. The left flank area was then cleansed and draped in the usual sterile fashion. 1% Lidocaine was used for local anesthesia. Under ultrasound guidance, a 21 gauge needle was advanced into a left inferior renal pole calyx. An 018 wire was advanced into the collecting system. The needle was then exchanged for a 5 Cymraes micropuncture sheath. Then with catheter and guide wire manipulation, a 0.035 guide wire was placed past the obstruction and into the urinary bladder. Next, an 8 Cymraes x 26 cm ureteral stent was advanced over the wire and a distal pigtail was formed within the urinary bladder and a proximal pigtail was formed within the left renal collecting system. A clean dry sterile dressing was placed. Patient tolerated the procedure well was discharged from the IR suite in stable condition. FINDINGS: Antegrade nephrostogram demonstrates mild hydronephrosis secondary to severe compression/obstruction of the distal ureter. Successful placement of left 8 Cymraes by 24 cm ureteral stent with locking pigtails in good position. No results found. PROCEDURES   Unless otherwise noted below, none     Procedures    CRITICAL CARE TIME (.cctime)   There was a high probability of life-threatening clinical deterioration in the patient's condition requiring my urgent intervention.   I personally saw the patient and independently provided 31 minutes of non-concurrent critical care out of the total shared critical care time provided, excluding separately reportable procedures. PAST MEDICAL HISTORY      has a past medical history of ADHD (attention deficit hyperactivity disorder), Asthma, Bipolar 1 disorder ([address] Bipolar disorder ([address] FLJJI-32 (2020), Depression, Irregular periods, and PONV (postoperative nausea and vomiting). EMERGENCY DEPARTMENT COURSE and DIFFERENTIAL DIAGNOSIS/MDM:   Vitals:    Vitals:    01/05/23 1318 01/05/23 1350 01/05/23 1355 01/05/23 1430   BP: 114/76 112/69 118/71 (!) 110/55   Pulse: 76 76 80 80   Resp: 14 17 14 21   Temp:       TempSrc:       SpO2: 96% 96% 97% 98%   Weight:       Height:           Patient was given the following medications:  Medications   piperacillin-tazobactam (ZOSYN) 4,500 mg in dextrose 5 % 100 mL IVPB (mini-bag) (4,500 mg IntraVENous New Bag 1/5/23 1518)   morphine injection 4 mg (4 mg IntraVENous Given 1/5/23 1154)   ondansetron (ZOFRAN) injection 4 mg (4 mg IntraVENous Given 1/5/23 1155)   0.9 % sodium chloride bolus (0 mLs IntraVENous Stopped 1/5/23 1430)   morphine injection 4 mg (4 mg IntraVENous Given 1/5/23 1353)             Is this patient to be included in the SEP-1 Core Measure due to severe sepsis or septic shock? No   Exclusion criteria - the patient is NOT to be included for SEP-1 Core Measure due to:  2+ SIRS criteria are not met    Chronic Conditions affecting care:    has a past medical history of ADHD (attention deficit hyperactivity disorder), Asthma, Bipolar 1 disorder ([facility] [address] Bipolar disorder ([facility] [address] ECCAC-72 (2020), Depression, Irregular periods, and PONV (postoperative nausea and vomiting). CONSULTS: (Who and What was discussed)  IP CONSULT TO UROLOGY  IV antibiotics and admission. Likely gas-forming organism. Low suspicion for fistula.     Social Determinants : None    Records Reviewed (Source): pmh    CC/HPI Summary, DDx, ED Course, and Reassessment: Patient presents for evaluation of severe abdominal pain with associated nausea and vomiting status post stent placement 2 days ago. On exam, she appears uncomfortable and looks like she does not feel well but is in no acute distress and nontoxic. She is tachycardic but vitals are otherwise stable and she is afebrile. Lungs are clear to auscultation bilaterally. She has diffuse lower abdominal tenderness, no peritoneal signs. She was given IV fluids, morphine and Zofran for symptomatic relief and will be reevaluated. Disposition Considerations (tests considered but not done, Admit vs D/C, Shared Decision Making, Pt Expectation of Test or Tx.): CBC and CMP are unremarkable. No leukocytosis. Lactic acid 1.1. Blood cultures are pending. Urinalysis is positive for large blood and large leukocytes with 1+ bacteria and 71 white blood cells. Lipase 11. Beta-hCG is negative. CT shows interval enlargement of the previous identified left pelvic soft tissue mass. Left ureteral stent is in place no evidence of hydronephrosis, ever, there is air within the left renal pelvis and urinary bladder. This could be due to recent instrumentation. Can also not rule out gas-forming infection or bladder/vaginal fistula though this is less likely. Patient started on Zosyn and will be admitted. Urology consulted. Admitting physician, Dr. Bette Cabrera, will resume care of the patient at this time. Patient was informed and agreeable. Stable for admission. I am the Primary Clinician of Record. FINAL IMPRESSION      1. Pyelonephritis          DISPOSITION/PLAN     DISPOSITION Decision To Admit 01/05/2023 03:19:06 PM      PATIENT REFERRED TO:  No follow-up provider specified.     DISCHARGE MEDICATIONS:  New Prescriptions    No medications on file       DISCONTINUED MEDICATIONS:  Discontinued Medications    No medications on file              (Please note that portions of this note were completed with a voice recognition program.  Efforts were made to edit the dictations but occasionally words are mis-transcribed.)    Edson Mosher PA-C (electronically signed)           [facility] [place], [place]  01/05/23 1524

## 2023-01-06 LAB
ANION GAP SERPL CALCULATED.3IONS-SCNC: 8 MMOL/L (ref 3–16)
BUN BLDV-MCNC: 10 MG/DL (ref 7–20)
CALCIUM SERPL-MCNC: 9.2 MG/DL (ref 8.3–10.6)
CHLORIDE BLD-SCNC: 99 MMOL/L (ref 99–110)
CO2: 27 MMOL/L (ref 21–32)
CREAT SERPL-MCNC: 0.8 MG/DL (ref 0.6–1.1)
GFR SERPL CREATININE-BSD FRML MDRD: >60 ML/MIN/{1.73_M2}
GLUCOSE BLD-MCNC: 99 MG/DL (ref 70–99)
HCT VFR BLD CALC: 32.9 % (ref 36–48)
HEMOGLOBIN: 11.3 G/DL (ref 12–16)
MCH RBC QN AUTO: 31 PG (ref 26–34)
MCHC RBC AUTO-ENTMCNC: 34.4 G/DL (ref 31–36)
MCV RBC AUTO: 90 FL (ref 80–100)
PDW BLD-RTO: 12.1 % (ref 12.4–15.4)
PLATELET # BLD: 263 K/UL (ref 135–450)
PMV BLD AUTO: 8.7 FL (ref 5–10.5)
POTASSIUM SERPL-SCNC: 3.9 MMOL/L (ref 3.5–5.1)
RBC # BLD: 3.66 M/UL (ref 4–5.2)
SODIUM BLD-SCNC: 134 MMOL/L (ref 136–145)
URINE CULTURE, ROUTINE: NORMAL
WBC # BLD: 6.8 K/UL (ref 4–11)

## 2023-01-06 PROCEDURE — 94640 AIRWAY INHALATION TREATMENT: CPT

## 2023-01-06 PROCEDURE — 2580000003 HC RX 258: Performed by: INTERNAL MEDICINE

## 2023-01-06 PROCEDURE — 36415 COLL VENOUS BLD VENIPUNCTURE: CPT

## 2023-01-06 PROCEDURE — 85027 COMPLETE CBC AUTOMATED: CPT

## 2023-01-06 PROCEDURE — 6360000002 HC RX W HCPCS: Performed by: INTERNAL MEDICINE

## 2023-01-06 PROCEDURE — 94760 N-INVAS EAR/PLS OXIMETRY 1: CPT

## 2023-01-06 PROCEDURE — 80048 BASIC METABOLIC PNL TOTAL CA: CPT

## 2023-01-06 PROCEDURE — 6370000000 HC RX 637 (ALT 250 FOR IP): Performed by: NURSE PRACTITIONER

## 2023-01-06 PROCEDURE — 1200000000 HC SEMI PRIVATE

## 2023-01-06 PROCEDURE — 6370000000 HC RX 637 (ALT 250 FOR IP): Performed by: CLINICAL NURSE SPECIALIST

## 2023-01-06 PROCEDURE — 6370000000 HC RX 637 (ALT 250 FOR IP): Performed by: INTERNAL MEDICINE

## 2023-01-06 RX ORDER — OXYCODONE HYDROCHLORIDE 5 MG/1
10 TABLET ORAL
Status: DISCONTINUED | OUTPATIENT
Start: 2023-01-06 | End: 2023-01-10 | Stop reason: HOSPADM

## 2023-01-06 RX ORDER — POLYETHYLENE GLYCOL 3350 17 G/17G
17 POWDER, FOR SOLUTION ORAL 3 TIMES DAILY
Status: DISCONTINUED | OUTPATIENT
Start: 2023-01-06 | End: 2023-01-10 | Stop reason: HOSPADM

## 2023-01-06 RX ORDER — OXYCODONE HCL 20 MG/1
20 TABLET, FILM COATED, EXTENDED RELEASE ORAL EVERY 12 HOURS SCHEDULED
Status: DISCONTINUED | OUTPATIENT
Start: 2023-01-06 | End: 2023-01-09

## 2023-01-06 RX ORDER — SENNA AND DOCUSATE SODIUM 50; 8.6 MG/1; MG/1
2 TABLET, FILM COATED ORAL 2 TIMES DAILY PRN
Status: DISCONTINUED | OUTPATIENT
Start: 2023-01-06 | End: 2023-01-10 | Stop reason: HOSPADM

## 2023-01-06 RX ORDER — OXYCODONE HYDROCHLORIDE 5 MG/1
5 TABLET ORAL
Status: DISCONTINUED | OUTPATIENT
Start: 2023-01-06 | End: 2023-01-10 | Stop reason: HOSPADM

## 2023-01-06 RX ORDER — OXYBUTYNIN CHLORIDE 5 MG/1
5 TABLET ORAL 3 TIMES DAILY
Status: DISCONTINUED | OUTPATIENT
Start: 2023-01-06 | End: 2023-01-10 | Stop reason: HOSPADM

## 2023-01-06 RX ADMIN — Medication 2 PUFF: at 00:28

## 2023-01-06 RX ADMIN — OXYCODONE HYDROCHLORIDE 20 MG: 20 TABLET, FILM COATED, EXTENDED RELEASE ORAL at 20:54

## 2023-01-06 RX ADMIN — OXYBUTYNIN CHLORIDE 5 MG: 5 TABLET ORAL at 15:21

## 2023-01-06 RX ADMIN — PANTOPRAZOLE SODIUM 40 MG: 40 TABLET, DELAYED RELEASE ORAL at 15:21

## 2023-01-06 RX ADMIN — OXYCODONE HYDROCHLORIDE 20 MG: 20 TABLET, FILM COATED, EXTENDED RELEASE ORAL at 11:27

## 2023-01-06 RX ADMIN — SENNOSIDES 17.2 MG: 8.6 TABLET, FILM COATED ORAL at 07:57

## 2023-01-06 RX ADMIN — OXYCODONE 10 MG: 5 TABLET ORAL at 20:08

## 2023-01-06 RX ADMIN — MORPHINE SULFATE 4 MG: 4 INJECTION, SOLUTION INTRAMUSCULAR; INTRAVENOUS at 18:18

## 2023-01-06 RX ADMIN — OXYCODONE 10 MG: 5 TABLET ORAL at 11:28

## 2023-01-06 RX ADMIN — POLYETHYLENE GLYCOL 3350 17 G: 17 POWDER, FOR SOLUTION ORAL at 15:21

## 2023-01-06 RX ADMIN — STANDARDIZED SENNA CONCENTRATE AND DOCUSATE SODIUM 2 TABLET: 8.6; 5 TABLET ORAL at 15:27

## 2023-01-06 RX ADMIN — PHENAZOPYRIDINE 200 MG: 100 TABLET ORAL at 16:54

## 2023-01-06 RX ADMIN — MORPHINE SULFATE 4 MG: 4 INJECTION, SOLUTION INTRAMUSCULAR; INTRAVENOUS at 03:58

## 2023-01-06 RX ADMIN — CEFTRIAXONE SODIUM 1000 MG: 1 INJECTION, POWDER, FOR SOLUTION INTRAMUSCULAR; INTRAVENOUS at 20:59

## 2023-01-06 RX ADMIN — BISACODYL 5 MG: 5 TABLET, COATED ORAL at 05:01

## 2023-01-06 RX ADMIN — Medication 2 PUFF: at 19:31

## 2023-01-06 RX ADMIN — CETIRIZINE HYDROCHLORIDE 5 MG: 10 TABLET, FILM COATED ORAL at 07:57

## 2023-01-06 RX ADMIN — OXYCODONE 10 MG: 5 TABLET ORAL at 16:54

## 2023-01-06 RX ADMIN — PANTOPRAZOLE SODIUM 40 MG: 40 TABLET, DELAYED RELEASE ORAL at 05:01

## 2023-01-06 RX ADMIN — POLYETHYLENE GLYCOL 3350 17 G: 17 POWDER, FOR SOLUTION ORAL at 20:50

## 2023-01-06 RX ADMIN — TAMSULOSIN HYDROCHLORIDE 0.4 MG: 0.4 CAPSULE ORAL at 07:57

## 2023-01-06 RX ADMIN — MORPHINE SULFATE 4 MG: 4 INJECTION, SOLUTION INTRAMUSCULAR; INTRAVENOUS at 20:54

## 2023-01-06 RX ADMIN — NALOXEGOL OXALATE 25 MG: 25 TABLET, FILM COATED ORAL at 11:28

## 2023-01-06 RX ADMIN — FLUTICASONE PROPIONATE 2 SPRAY: 50 SPRAY, METERED NASAL at 07:57

## 2023-01-06 RX ADMIN — MORPHINE SULFATE 4 MG: 4 INJECTION, SOLUTION INTRAMUSCULAR; INTRAVENOUS at 15:22

## 2023-01-06 RX ADMIN — MORPHINE SULFATE 4 MG: 4 INJECTION, SOLUTION INTRAMUSCULAR; INTRAVENOUS at 07:54

## 2023-01-06 RX ADMIN — Medication 2 PUFF: at 00:34

## 2023-01-06 RX ADMIN — PHENAZOPYRIDINE 200 MG: 100 TABLET ORAL at 07:57

## 2023-01-06 RX ADMIN — OXYBUTYNIN CHLORIDE 5 MG: 5 TABLET ORAL at 10:29

## 2023-01-06 RX ADMIN — TRAZODONE HYDROCHLORIDE 50 MG: 50 TABLET ORAL at 20:50

## 2023-01-06 RX ADMIN — PHENAZOPYRIDINE 200 MG: 100 TABLET ORAL at 12:47

## 2023-01-06 RX ADMIN — SENNOSIDES 17.2 MG: 8.6 TABLET, FILM COATED ORAL at 20:49

## 2023-01-06 RX ADMIN — OXYCODONE AND ACETAMINOPHEN 1 TABLET: 10; 325 TABLET ORAL at 05:03

## 2023-01-06 RX ADMIN — GABAPENTIN 300 MG: 300 CAPSULE ORAL at 20:49

## 2023-01-06 RX ADMIN — MORPHINE SULFATE 4 MG: 4 INJECTION, SOLUTION INTRAMUSCULAR; INTRAVENOUS at 10:29

## 2023-01-06 RX ADMIN — MORPHINE SULFATE 4 MG: 4 INJECTION, SOLUTION INTRAMUSCULAR; INTRAVENOUS at 12:47

## 2023-01-06 RX ADMIN — OXYBUTYNIN CHLORIDE 5 MG: 5 TABLET ORAL at 20:50

## 2023-01-06 ASSESSMENT — ENCOUNTER SYMPTOMS
BACK PAIN: 1
ABDOMINAL PAIN: 1
SHORTNESS OF BREATH: 1

## 2023-01-06 ASSESSMENT — PAIN DESCRIPTION - LOCATION
LOCATION: ABDOMEN;BACK
LOCATION: ABDOMEN;BACK
LOCATION: ABDOMEN
LOCATION: ABDOMEN;BACK

## 2023-01-06 ASSESSMENT — PAIN - FUNCTIONAL ASSESSMENT
PAIN_FUNCTIONAL_ASSESSMENT: ACTIVITIES ARE NOT PREVENTED
PAIN_FUNCTIONAL_ASSESSMENT: ACTIVITIES ARE NOT PREVENTED
PAIN_FUNCTIONAL_ASSESSMENT: PREVENTS OR INTERFERES SOME ACTIVE ACTIVITIES AND ADLS

## 2023-01-06 ASSESSMENT — PAIN SCALES - GENERAL
PAINLEVEL_OUTOF10: 10
PAINLEVEL_OUTOF10: 8
PAINLEVEL_OUTOF10: 8
PAINLEVEL_OUTOF10: 7
PAINLEVEL_OUTOF10: 9
PAINLEVEL_OUTOF10: 8
PAINLEVEL_OUTOF10: 10

## 2023-01-06 ASSESSMENT — PAIN DESCRIPTION - ORIENTATION
ORIENTATION: LOWER

## 2023-01-06 ASSESSMENT — PAIN DESCRIPTION - PAIN TYPE: TYPE: CHRONIC PAIN

## 2023-01-06 ASSESSMENT — PAIN DESCRIPTION - DESCRIPTORS
DESCRIPTORS: CRAMPING;CRUSHING
DESCRIPTORS: ACHING;STABBING;CRAMPING
DESCRIPTORS: ACHING;CRAMPING;SHARP

## 2023-01-06 NOTE — RT PROTOCOL NOTE
RT Nebulizer Bronchodilator Protocol Note    There is a bronchodilator order in the chart from a provider indicating to follow the RT Bronchodilator Protocol and there is an Initiate RT Bronchodilator Protocol order as well (see protocol at bottom of note). CXR Findings:  No results found. The findings from the last RT Protocol Assessment were as follows:  Smoking: None or smoker <15 pack years  Respiratory Pattern: Regular pattern and RR 12-20 bpm  Breath Sounds: Intermittent or unilateral wheezes  Cough: Strong, spontaneous, non-productive  Indication for Bronchodilator Therapy:    Bronchodilator Assessment Score: 4    Aerosolized bronchodilator medication orders have been revised according to the RT Nebulizer Bronchodilator Protocol below. Respiratory Therapist to perform RT Therapy Protocol Assessment initially then follow the protocol. Repeat RT Therapy Protocol Assessment PRN for score 0-3 or on second treatment, BID, and PRN for scores above 3. No Indications - adjust the frequency to every 6 hours PRN wheezing or bronchospasm, if no treatments needed after 48 hours then discontinue using Per Protocol order mode. If indication present, adjust the RT bronchodilator orders based on the Bronchodilator Assessment Score as indicated below. If a patient is on this medication at home then do not decrease Frequency below that used at home. 0-3 - enter or revise RT bronchodilator order(s) to equivalent RT Bronchodilator order with Frequency of every 4 hours PRN for wheezing or increased work of breathing using Per Protocol order mode. 4-6 - enter or revise RT Bronchodilator order(s) to two equivalent RT bronchodilator orders with one order with BID Frequency and one order with Frequency of every 4 hours PRN wheezing or increased work of breathing using Per Protocol order mode.          7-10 - enter or revise RT Bronchodilator order(s) to two equivalent RT bronchodilator orders with one order with TID Frequency and one order with Frequency of every 4 hours PRN wheezing or increased work of breathing using Per Protocol order mode. 11-13 - enter or revise RT Bronchodilator order(s) to one equivalent RT bronchodilator order with QID Frequency and an Albuterol order with Frequency of every 4 hours PRN wheezing or increased work of breathing using Per Protocol order mode. Greater than 13 - enter or revise RT Bronchodilator order(s) to one equivalent RT bronchodilator order with every 4 hours Frequency and an Albuterol order with Frequency of every 2 hours PRN wheezing or increased work of breathing using Per Protocol order mode. RT to enter RT Home Evaluation for COPD & MDI Assessment order using Per Protocol order mode.     Electronically signed by Haily Wang RCP on 1/6/2023 at 1:26 AM

## 2023-01-06 NOTE — PROGRESS NOTES
[facility]    Met with patient and her mother to discuss hospice services, hospice philosophy, levels of care and locations of care. Reviewed hospice at [facility] inpatient facility versus hospice at home. Currently there are not any beds available at the [facility] inpatient unit. Patient agreeable to follow up tomorrow. Thank you for allowing us to be a part of Connie's care.     Monet Boss RN  [facility] #: [phone]  Cell: [phone]

## 2023-01-06 NOTE — PROGRESS NOTES
Pt c/o burning with urination. Also requesting additional laxative. Dr. Adiel Bajwa notified.  New orders on chart

## 2023-01-06 NOTE — CONSULTS
Urology Consult Note  [facility]     Patient: Pina Garcia MRN: [id number]  Room/Bed: [address] [place] [id number]   YOB: 1990  Age/Sex: 29 y. o.female  Admission Date: 1/5/2023     Date of Service:  1/6/2023    Consulting Provider: TAYE Ozuna CNP  Admitting/Requesting Physician: Kendal Peng MD  Primary Care Physician: Filipe Neumann MD    Reason for Consult: Flank Pain    ASSESSMENT/PLAN     30 yo female with hx of of advanced cervical cancer, metastasizing to the left ovary, with left hydronephrosis. Dr. Jaxon Borjas had attempted cysto retrograde stent placement but unable to get stent up. Recently had a LEFT antegrade stent insertion per IR 01/03/2022. She is admitted with severe pelvic pain and left flank pain associated with nausea and vomiting. UA is possibly infected, although possibly contaminated as well. Air in the kidney may be 2/2 to recent instrumentation vs pyelonephritis. She does not want further care, she just wants palliative/hospice care. Recommendations:  Discussed that stent removal would likely results in greater pain due to obstruction, hydronephrosis and infection. We will try to manage her stent pain with pyridium, oxybutynin, flomax and narcotics- ordered. Could consider exchanging current 8F x 26cm stent for a smaller stent F and length. This would need to be done antegrade with IR. Will follow. All the patients questions were answered. She understands the plan as listed above. HISTORY     Chief Complaint:   Chief Complaint   Patient presents with    Other     Pt had stent placed in left ureter on 1/3/2023. Pt started having pain 0300 on Wednesday, 10/10 of back pain in left back all over. N/V unable to keep anything down since 1/4/23. Has cancerous mass pressing on kidney. From home. History of Present Illness: Pina Garcia is a 29 y.o. female with flank pain. Onset of symptoms was tuesday with improving course since that time. are aggravated by stent insertion. Symptoms improved with pyridium and narcotics. Associated symptoms include pelvic pain. Patient also reports back pain. She has tried the following treatments: pyrdium, oxybutynin, flomax. Past Medical History:  She has a past medical history of ADHD (attention deficit hyperactivity disorder), Asthma, Bipolar 1 disorder ([facility] [address] Bipolar disorder ([facility] [address] GAXDX-02 (2020), Depression, Irregular periods, and PONV (postoperative nausea and vomiting). Past Surgical History:  She has a past surgical history that includes Stomach surgery (06/2021); [place] tooth extraction; Cervix biopsy (N/A, 10/28/2021); LEEP (11/2021); Total abdominal hysterectomy w/ bilateral salpingoophorectomy (N/A, 3/14/2022); CT BIOPSY ABDOMEN RETROPERITONEUM (12/7/2022); and IR GUIDED URETERAL STENT PLACE THRU EXIST TRACT (1/3/2023). Allergies: Allergies   Allergen Reactions    Other      History of bariatric surgery        Social History:  She reports that she has never smoked. She has never used smokeless tobacco. She reports current alcohol use of about 3.0 standard drinks per week. She reports current drug use. Drug: Marijuana Delaney Fogo). Family History:  family history includes Cancer in her mother; High Blood Pressure in her father.     Medications:  Scheduled Meds:   cefTRIAXone (ROCEPHIN) IV  1,000 mg IntraVENous Q24H    albuterol sulfate HFA  2 puff Inhalation 4x daily    mometasone-formoterol  2 puff Inhalation BID    gabapentin  300 mg Oral Nightly    cetirizine  5 mg Oral Daily    fluticasone  2 spray Each Nostril Daily    senna  2 tablet Oral BID    tamsulosin  0.4 mg Oral Daily    traZODone  50 mg Oral Nightly    enoxaparin  40 mg SubCUTAneous Daily    pantoprazole  40 mg Oral BID AC    phenazopyridine  200 mg Oral TID WC     Continuous Infusions:  PRN Meds:bisacodyl, acetaminophen, albuterol, oxyCODONE-acetaminophen, ondansetron, morphine, LORazepam, polyethylene glycol    Review of Systems:  Pertinent positives/negatives reviewed in HPI. All other systems reviewed and negative, unless noted below. Constitutional: Negative  Genitourinary: left flank pain and suprapubic pressure otherwise Negative  HEENT: Negative   Cardiovascular: Negative   Respiratory: Negative   Gastrointestinal: Negative   Musculoskeletal: Negative   Neurological: Negative   Psychiatric: Negative   Integumentary: Negative     PHYSICAL EXAM     Vitals:    01/06/23 0751   BP: 110/75   Pulse: 78   Resp: 16   Temp: 98.2 °F (36.8 °C)   SpO2: 97%     Constitutional: NAD, well-developed, well-nourished. HEENT: MMM. Hearing intact. Neck: no thyroid masses appreciated. Trachea is midline. Neck appears unremarkable. Lymph: no palpable adenopathy in supraclavicular, or axillary lymph nodes. Cardiovascular: Regular rate. No peripheral edema. ABDOMEN: Abdomen soft, non-tender, non-distended. No enlarged liver or spleen. No hernias noted. Stool occult blood not indicated. Respiratory: Respirations are even and non-labored. No audible breath sounds. Genitourinary: no CVAT, pelvic deferred. Psychiatric: A + O x 3, normal affect. Insight appears intact. Muskuloskeletal: CRUZ x 4  Skin: Pink, warm and dry. No rashes on face and arms.       Intake/Output Summary (Last 24 hours) at 1/6/2023 0845  Last data filed at 1/6/2023 0503  Gross per 24 hour   Intake 390 ml   Output 350 ml   Net 40 ml       LABS     CBC:   Lab Results   Component Value Date/Time    WBC 6.8 01/06/2023 06:25 AM    RBC 3.66 01/06/2023 06:25 AM    HGB 11.3 01/06/2023 06:25 AM    HCT 32.9 01/06/2023 06:25 AM    MCV 90.0 01/06/2023 06:25 AM    MCH 31.0 01/06/2023 06:25 AM    MCHC 34.4 01/06/2023 06:25 AM    RDW 12.1 01/06/2023 06:25 AM     01/06/2023 06:25 AM    MPV 8.7 01/06/2023 06:25 AM     BMP:   Lab Results   Component Value Date/Time     01/06/2023 06:25 AM    K 3.9 01/06/2023 06:25 AM    K 4.2 03/17/2022 09:09 AM    CL 99 01/06/2023 06:25 AM CO2 27 01/06/2023 06:25 AM    BUN 10 01/06/2023 06:25 AM    CREATININE 0.8 01/06/2023 06:25 AM    GLUCOSE 99 01/06/2023 06:25 AM    CALCIUM 9.2 01/06/2023 06:25 AM     Urinalysis:   Lab Results   Component Value Date/Time    COLORU DARK YELLOW 01/05/2023 01:20 PM    GLUCOSEU Negative 01/05/2023 01:20 PM    BLOODU LARGE 01/05/2023 01:20 PM    NITRU Negative 01/05/2023 01:20 PM    LEUKOCYTESUR LARGE 01/05/2023 01:20 PM     Urine culture: No results for input(s): LABURIN in the last 72 hours. IMAGING     CT ABDOMEN PELVIS WO CONTRAST Additional Contrast? None    Result Date: 1/5/2023  EXAMINATION: CT OF THE ABDOMEN AND PELVIS WITHOUT CONTRAST 1/5/2023 1:32 pm TECHNIQUE: CT of the abdomen and pelvis was performed without the administration of intravenous contrast. Multiplanar reformatted images are provided for review. Automated exposure control, iterative reconstruction, and/or weight based adjustment of the mA/kV was utilized to reduce the radiation dose to as low as reasonably achievable. COMPARISON: CT abdomen/pelvis dated 9 November 2022 HISTORY: ORDERING SYSTEM PROVIDED HISTORY: ureteral stent TECHNOLOGIST PROVIDED HISTORY: Reason for exam:->ureteral stent Additional Contrast?->None Decision Support Exception - unselect if not a suspected or confirmed emergency medical condition->Emergency Medical Condition (MA) Is the patient pregnant?->No Reason for Exam: ureteral stent FINDINGS: Lower Chest: Lung bases are clear Organs: Limited evaluation of the abdominal organs due to lack of intravenous contrast.  The liver and gallbladder are normal.  The spleen and pancreas are normal.  Adrenal glands are normal.  The right kidney is normal.  There is a ureteral stent on the left. The proximal end is coiled within the renal pelvis. The distal end is coiled within the urinary bladder. No evidence of renal stones. There is no hydronephrosis on the left.   However, there is a small amount of air within the left renal pelvis. No perinephric fat stranding. GI/Bowel: Small hiatal hernia hernia. Stomach and small bowel loops appear normal without evidence of obstruction. Colonic loops appear normal without evidence of wall thickening or surrounding inflammatory change. Pelvis: There is a small amount of air within the urinary bladder. Status post hysterectomy. Again noted within the left deep pelvis in close association to the vaginal cuff there is a soft tissue mass which measures 5.5 x 4.4 cm (3.7 x 3.1 cm previously). The mass abuts the left ureter and is seen in close approximation to the adjacent urinary bladder. Trace free fluid. Peritoneum/Retroperitoneum: The aorta is normal caliber. No lymphadenopathy. Bones/Soft Tissues: No suspicious osseous lesion     1. Interval enlargement of the previously identified left pelvic soft tissue mass. 2.  There is a left ureteral stent in place. There is no evidence of hydronephrosis on the left. However, there is a small amount of air within the left renal pelvis. There is also a small amount of air within the urinary bladder. This could be secondary to recent instrumentation. However, if there is no recent history of instrumentation then this could represent a bladder/vaginal fistula possibly related to the left pelvic mass which abuts the vagina or possibly a fistula between the urinary bladder and adjacent sigmoid colon. CT GUIDED NEEDLE PLACEMENT    Result Date: 12/7/2022  PROCEDURE: CT GUIDED CORE BIOPSY OF PELVIC MASS MODERATE CONSCIOUS SEDATION 12/7/2022 HISTORY: ORDERING SYSTEM PROVIDED HISTORY: Vaginal Pap smear following hysterectomy for malignancy TECHNOLOGIST PROVIDED HISTORY: Reason for exam:->Pelvic Mass Biopsy Is the patient pregnant?->No Reason for Exam: Pelvic Mass Biopsy PHYSICIANS: Shar Meital SEDATION: 3 mgversed and 150 mcg fentanyl were titrated intravenously for moderate sedation monitored under my direction.   Total intraservice time of sedation was 17 minutes. The patient's vital signs were monitored throughout the procedure and recorded in the patient's medical record by the nurse. TECHNIQUE: Informed consent was obtained after a detailed discussion about the procedure including the risk, benefits, and alternatives. Universal protocol was followed. Axial images were obtained through the pelvis and a suitable skin site was prepped and draped in sterile fashion. Local anesthesia was achieved with lidocaine. Under intermittent CT guidance, a 17 gauge coaxial needle was inserted into the pelvic mass. Through this, 3 18 gauge core biopsy specimens were obtained using coaxial technique and submitted to pathology. Gel-Foam slurry was administered as the needle was removed. A sterile bandage was then placed. The patient tolerated the procedure well. Estimated blood loss: Less than 5 cc Dose modulation, iterative reconstruction, and/or weight based adjustment of the mA/kV was utilized to reduce the radiation dose to as low as reasonably achievable. DLP: 253.21 mGy-cm     Successful CT guided pelvic mass biopsy. CT BIOPSY ABDOMEN RETROPERITONEUM    Result Date: 12/7/2022  PROCEDURE: CT GUIDED CORE BIOPSY OF PELVIC MASS MODERATE CONSCIOUS SEDATION 12/7/2022 HISTORY: ORDERING SYSTEM PROVIDED HISTORY: Vaginal Pap smear following hysterectomy for malignancy TECHNOLOGIST PROVIDED HISTORY: Reason for exam:->Pelvic Mass Biopsy Is the patient pregnant?->No Reason for Exam: Pelvic Mass Biopsy PHYSICIANS: Shar Lewis SEDATION: 3 mgversed and 150 mcg fentanyl were titrated intravenously for moderate sedation monitored under my direction. Total intraservice time of sedation was 17 minutes. The patient's vital signs were monitored throughout the procedure and recorded in the patient's medical record by the nurse. TECHNIQUE: Informed consent was obtained after a detailed discussion about the procedure including the risk, benefits, and alternatives.   Universal protocol was followed. Axial images were obtained through the pelvis and a suitable skin site was prepped and draped in sterile fashion. Local anesthesia was achieved with lidocaine. Under intermittent CT guidance, a 17 gauge coaxial needle was inserted into the pelvic mass. Through this, 3 18 gauge core biopsy specimens were obtained using coaxial technique and submitted to pathology. Gel-Foam slurry was administered as the needle was removed. A sterile bandage was then placed. The patient tolerated the procedure well. Estimated blood loss: Less than 5 cc Dose modulation, iterative reconstruction, and/or weight based adjustment of the mA/kV was utilized to reduce the radiation dose to as low as reasonably achievable. DLP: 253.21 mGy-cm     Successful CT guided pelvic mass biopsy. IR GUIDED URETERAL STENT PLACE THRU EXIST TRACT    Result Date: 1/3/2023  PROCEDURE: INTRO URETERAL CATH/STENT MODERATE CONSCIOUS SEDATION 1/3/2023 HISTORY: ORDERING SYSTEM PROVIDED HISTORY: Hydronephrosis, unspecified hydronephrosis type TECHNOLOGIST PROVIDED HISTORY: Is the patient pregnant?->No TECHNIQUE: Ultrasound and fluoroscopic guidance CONTRAST: 20 cc of contrast SEDATION: Moderate sedation was provided by the interventional radiology nurse under the supervision of the Interventional radiologist for 45 minutes. 50 mg Benadryl, 5 mg Versed, and 200 mcg fentanyl was administered. FLUOROSCOPY DOSE AND TYPE OR TIME AND EXPOSURES: Time: 7.5 minutes Dose: 54.75 mGy Estimated blood loss: Less than 5 cc DESCRIPTION OF PROCEDURE: Informed consent was obtained after a detailed explanation of the procedure including risks, benefits, and alternatives. Universal protocol was observed. Sterile gowns, masks, hats and gloves utilized for maximal sterile barrier. Patient was placed in the prone position on the fluoroscopy table. The left flank area was then cleansed and draped in the usual sterile fashion.  1% Lidocaine was used for local anesthesia. Under ultrasound guidance, a 21 gauge needle was advanced into a left inferior renal pole calyx. An 018 wire was advanced into the collecting system. The needle was then exchanged for a 5 [place] micropuncture sheath. Then with catheter and guide wire manipulation, a 0.035 guide wire was placed past the obstruction and into the urinary bladder. Next, an 8 [place] x 26 cm ureteral stent was advanced over the wire and a distal pigtail was formed within the urinary bladder and a proximal pigtail was formed within the left renal collecting system. A clean dry sterile dressing was placed. Patient tolerated the procedure well was discharged from the IR suite in stable condition. FINDINGS: Antegrade nephrostogram demonstrates mild hydronephrosis secondary to severe compression/obstruction of the distal ureter. Successful placement of left 8 [place] by 24 cm ureteral stent with locking pigtails in good position.             Electronically signed by: TAYE Holland CNP, 1/6/2023   [facility]  Office contact: [phone]

## 2023-01-06 NOTE — PROGRESS NOTES
Hospitalist Progress Note      PCP: Umair Martin MD    Date of Admission: 1/5/2023    Chief Complaint: Diffuse pain      Subjective:   Continues to have persistent L flank and abdominal pain. Intermittent nausea and vomiting. S/p hospice eval    Medications:  Reviewed    Infusion Medications   Scheduled Medications    oxybutynin  5 mg Oral TID    oxyCODONE  20 mg Oral 2 times per day    [START ON 1/7/2023] naloxegol  25 mg Oral QAM AC    polyethylene glycol  17 g Oral TID    cefTRIAXone (ROCEPHIN) IV  1,000 mg IntraVENous Q24H    albuterol sulfate HFA  2 puff Inhalation 4x daily    mometasone-formoterol  2 puff Inhalation BID    gabapentin  300 mg Oral Nightly    cetirizine  5 mg Oral Daily    fluticasone  2 spray Each Nostril Daily    senna  2 tablet Oral BID    tamsulosin  0.4 mg Oral Daily    traZODone  50 mg Oral Nightly    enoxaparin  40 mg SubCUTAneous Daily    pantoprazole  40 mg Oral BID AC    phenazopyridine  200 mg Oral TID WC     PRN Meds: bisacodyl, oxyCODONE, oxyCODONE, sennosides-docusate sodium, acetaminophen, albuterol, ondansetron, morphine, LORazepam      Intake/Output Summary (Last 24 hours) at 1/6/2023 1415  Last data filed at 1/6/2023 0503  Gross per 24 hour   Intake 390 ml   Output 350 ml   Net 40 ml       Exam:    /82   Pulse 75   Temp 97.7 °F (36.5 °C) (Oral)   Resp 18   Ht 5' 7\" (1.702 m)   Wt 189 lb 8 oz (86 kg)   SpO2 95%   BMI 29.68 kg/m²     Gen/overall appearance: Not in acute distress. Alert. Head: Normocephalic, atraumatic  Eyes: EOMI, no scleral icterus  CVS: regular rhythm, Normal S1S2  Pulm: Clear to auscultation bilaterally. No crackles/wheezes  Gastrointestinal: Soft, diffuse TTP, no guarding or rebound  Extremities: No edema.  No erythema or warmth  Neuro: No gross focal deficits noted  Skin: Warm, dry    Labs:   Recent Labs     01/05/23  1158 01/06/23  0625   WBC 8.3 6.8   HGB 12.7 11.3*   HCT 38.4 32.9*    263     Recent Labs 01/05/23  1158 01/06/23  0625   * 134*   K 3.8 3.9   CL 97* 99   CO2 28 27   BUN 10 10   CREATININE 0.9 0.8   CALCIUM 9.5 9.2     Recent Labs     01/05/23  1158   AST 15   ALT 9*   BILITOT 0.5   ALKPHOS 58     No results for input(s): INR in the last 72 hours. No results for input(s): Maura Lawrence in the last 72 hours. Assessment/Plan:    Active Hospital Problems    Diagnosis Date Noted    UTI (urinary tract infection) [N39.0] 01/05/2023     Priority: Medium       Metastatic cervical cancer  L hydronephrosis s/p stent placement 1/3/2023  - pain management  - urology consulted for eval  - pt and family with decision to transition to palliative / hospice care    UTI with suspected pyelonephritis  - on IV rocephin  - follow up cultures  - IVF hydration    PMH of morbid obesity, anxiety    DVT Prophylaxis: lovenox  Diet: ADULT DIET;  Regular  Code Status: DNR-CC      Jennifer Sellers MD

## 2023-01-06 NOTE — CARE COORDINATION
Discharge Planning Note:      Spoke with Taniya from [facility] the patient is on the waiting list for [facility]. They will keep us informed.      Electronically signed by India De León RN on 1/6/2023 at 4:43 PM

## 2023-01-06 NOTE — CONSULTS
Oncology Hematology Care    Consult Note      Requesting Provider:  Frieda Wolf MD    CHIEF COMPLAINT:  Back pain      HISTORY OF PRESENT ILLNESS:      Ms. Chela Palomino  is a 29 y.o. female who came to the ER for back pain. She is a patient of Dr. Eleonora Hay with metastatic cervical cancer. 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 Her colposcopy on 10/7/2021 showed cervical mass which was quite friable. Pathology was consistent with squamous cell carcinoma in situ. She underwent EUA, cervical biopsies and a LEEP on 10/20/2021. The mass was about 4.5cm encompassing the entire ectocervix without parametrial or vaginal involvement. Pathology was consistent with moderately differentiated squamous cell carcinoma. PET scan showed hypermetabolic lesion in the cervix without evidence of metastatic disease. She underwent radical abdominal hysterectomy, bilateral salpingectomy, sentinel lymph node biopsy and ovarian transposition by Dr. Feliz Martínez on 3/14/2022. Pathology demonstrated residual squamous cell carcinoma measuring 4cm which was moderately differentiated with 2.9cm depth of invasion, lymphovascular invasion, margins were clear with closest margin at 1mm radially, 2 of 2 sentinel lymph nodes were positive from the right and the left pelvis with a deposit measuring up to 12mm. She was seen at [facility] for 2nd opinion on 4/4/2022. Adjuvant chemoradiation was recommended but the patient persistently denied chemotherapy or radiation treatment. In fall 2022 she developed left flank and pelvic pain. Imaging including PET scan showed recurrent disease involving the pelvis with left hydroureteronephrosis, suspected involvement of the left ovary with hypermetabolic cystic lesion in the upper right pelvis with adjacent surgical clips.   There is also a cystic metastatic lesion of the right ovary. CT-guided biopsy of pelvic mass completed on 12/7/2022 confirmed metastatic squamous cell carcinoma consistent with involvement by cervical carcinoma. She was seen by urology and underwent left ureteral stent with IR on 1/3/23. She has been having significant left-sided pelvic, abdominal, and back pain since the stent was placed. Urinalysis indicates UTI.     Past Medical History:    Past Medical History:   Diagnosis Date    ADHD (attention deficit hyperactivity disorder)     Asthma     Bipolar 1 disorder ([facility]     Bipolar disorder ([facility]     COVID-19 2020    Depression     Irregular periods     PONV (postoperative nausea and vomiting)        Past Surgical History:    Past Surgical History:   Procedure Laterality Date    CERVIX BIOPSY N/A 10/28/2021    CERVICAL BIOPSIES,  LOOP EXCISIONAL ELECTROCAUTERY PROCEDURE performed by Stephanie Joyner MD at [address]  12/7/2022    CT BIOPSY ABDOMEN RETROPERITONEUM 12/7/2022 [facility] CT SCAN    IR URETERAL STENT PLACEMENT THRU EXIST TRACT  1/3/2023    IR URETERAL STENT PLACEMENT THRU EXIST TRACT 1/3/2023 [facility] SPECIAL PROCEDURES    LEEP  11/2021    STOMACH SURGERY  06/2021    sleeve gastrectomy    HERVE AND BSO (CERVIX REMOVED) N/A 3/14/2022    ABDOMINAL RADICAL HYSTERECTOMY, BILATERAL SALPINGECTOMY, SENTINEL LYMPH NODE BIOPSY, PELVIC LYMPH NODE DISSECTION, OVARIAN TRANSPOSITION (53579, 208710, [id number]) performed by Stephanie Joyner MD at [address] [facility],[address]         Current Medications:    Current Facility-Administered Medications   Medication Dose Route Frequency Provider Last Rate Last Admin    bisacodyl (DULCOLAX) EC tablet 5 mg  5 mg Oral Daily PRN Gabriel Gagnon MD   5 mg at 01/06/23 0501    cefTRIAXone (ROCEPHIN) 1,000 mg in dextrose 5 % 50 mL IVPB mini-bag  1,000 mg IntraVENous Q24H Gabriel Gagnon MD   Stopped at 01/05/23 2251    acetaminophen (TYLENOL) tablet 500 mg  500 mg Oral Q6H PRN Michoacano Noel MD        albuterol (PROVENTIL) nebulizer solution 2.5 mg  2.5 mg Nebulization Q6H PRN Michoacano Noel MD        albuterol sulfate HFA (PROVENTIL;VENTOLIN;PROAIR) 108 (90 Base) MCG/ACT inhaler 2 puff  2 puff Inhalation 4x daily Michoacano Noel MD   2 puff at 01/06/23 0028    mometasone-formoterol (DULERA) 200-5 MCG/ACT inhaler 2 puff  2 puff Inhalation BID Michoacano Noel MD   2 puff at 01/06/23 0034    gabapentin (NEURONTIN) capsule 300 mg  300 mg Oral Nightly Michoacano Noel MD   300 mg at 01/05/23 2154    cetirizine (ZYRTEC) tablet 5 mg  5 mg Oral Daily Michoacano Noel MD   5 mg at 01/06/23 0757    fluticasone (FLONASE) 50 MCG/ACT nasal spray 2 spray  2 spray Each Nostril Daily Michoacano Noel MD   2 spray at 01/06/23 0757    oxyCODONE-acetaminophen (PERCOCET)  MG per tablet 1 tablet  1 tablet Oral Q4H PRN Michoacano Noel MD   1 tablet at 01/06/23 0503    senna (SENOKOT) tablet 17.2 mg  2 tablet Oral BID Michoacano Noel MD   17.2 mg at 01/06/23 0757    tamsulosin (FLOMAX) capsule 0.4 mg  0.4 mg Oral Daily Michoacano Noel MD   0.4 mg at 01/06/23 0757    traZODone (DESYREL) tablet 50 mg  50 mg Oral Nightly Michoacano Noel MD   50 mg at 01/05/23 2154    ondansetron (ZOFRAN) injection 4 mg  4 mg IntraVENous Q6H PRN Michoacano Noel MD        morphine injection 4 mg  4 mg IntraVENous Q2H PRN Michoacano Noel MD   4 mg at 01/06/23 0754    LORazepam (ATIVAN) injection 1 mg  1 mg IntraVENous Q4H PRN Michoacano Noel MD        enoxaparin (LOVENOX) injection 40 mg  40 mg SubCUTAneous Daily Michoacano Noel MD        pantoprazole (PROTONIX) tablet 40 mg  40 mg Oral BID AC Michoacano Noel MD   40 mg at 01/06/23 0501    polyethylene glycol (GLYCOLAX) packet 17 g  17 g Oral Daily PRN Michoacano Noel MD   17 g at 01/05/23 4344    phenazopyridine (PYRIDIUM) tablet 200 mg  200 mg Oral TID WC Michoacano Noel MD   200 mg at 01/06/23 8420     Allergies:     Allergies   Allergen Reactions    Other History of bariatric surgery       Social History:   Social History     Socioeconomic History    Marital status: Single     Spouse name: Not on file    Number of children: Not on file    Years of education: Not on file    Highest education level: Not on file   Occupational History    Not on file   Tobacco Use    Smoking status: Never    Smokeless tobacco: Never   Vaping Use    Vaping Use: Never used   Substance and Sexual Activity    Alcohol use: Yes     Alcohol/week: 3.0 standard drinks     Types: 3 Glasses of wine per week     Comment: social/ weekly    Drug use: Yes     Types: Marijuana Dariela Pleas)     Comment: medical card, edibles daily    Sexual activity: Yes   Other Topics Concern    Not on file   Social History Narrative    Not on file     Social Determinants of Health     Financial Resource Strain: Not on file   Food Insecurity: Not on file   Transportation Needs: Not on file   Physical Activity: Not on file   Stress: Not on file   Social Connections: Not on file   Intimate Partner Violence: Not on file   Housing Stability: Not on file          Family History:     Family History   Problem Relation Age of Onset    Cancer Mother         skin    High Blood Pressure Father        REVIEW OF SYSTEMS:      Constitutional:  No weight loss, No fever, No chills, No night sweats.     Eyes:  No impairment or change in vision  ENT / Mouth:  No pain, abnormal ulceration, bleeding, nasal drip or change in voice or hearing  Cardiovascular:  No chest pain, palpitations, new edema, or calf discomfort  Respiratory:  No pain, hemoptysis, change to breathing  Gastrointestinal:  No pain, cramping, jaundice, change to eating and bowel habits  Urinary:  No pain, bleeding or change in continence  Musculoskeletal:  No redness, pain, edema or weakness  Skin:  No pruritus, rash, change to nodules or lesions  Neurologic:  No discomfort, change in mental status, speech, sensory or motor activity  Psychiatric:  No change in concentration or change to affect or mood  Endocrine:  No hot flashes, increased thirst, or change to urine production  Hematologic: No petechiae, ecchymosis or bleeding  Lymphatic:  No lymphadenopathy or lymphedema  Allergy / Immunologic:  No eczema, hives, frequent or recurrent infections    PHYSICAL EXAM:      Vitals:  /75   Pulse 78   Temp 98.2 °F (36.8 °C) (Oral)   Resp 16   Ht 5' 7\" (1.702 m)   Wt 189 lb 8 oz (86 kg)   SpO2 97%   BMI 29.68 kg/m²     General appearance:  Appears comfortable  Eyes: Sclera clear. Pupils equal.  ENT: Moist oral mucosa. Trachea midline, no adenopathy. Cardiovascular: Regular rhythm, normal S1, S2. No murmur. No edema in lower extremities  Respiratory: Not using accessory muscles. Good inspiratory effort. Clear to auscultation bilaterally, no wheeze or crackles. GI: Abdomen soft, no tenderness, not distended  Musculoskeletal: No cyanosis in digits, neck supple  Neurology: CN 2-12 grossly intact. No speech or motor deficits  Psych: Normal affect. Alert and oriented in time, place and person  Skin: Warm, dry, normal turgor      DATA:    PT/INR:    Recent Labs     01/05/23  1158   PROT 7.3     PTT:  No results for input(s): APTT in the last 72 hours.   CMP:    Lab Results   Component Value Date/Time     01/06/2023 06:25 AM    K 3.9 01/06/2023 06:25 AM    K 4.2 03/17/2022 09:09 AM    CL 99 01/06/2023 06:25 AM    CO2 27 01/06/2023 06:25 AM    BUN 10 01/06/2023 06:25 AM    PROT 7.3 01/05/2023 11:58 AM     :    Lab Results   Component Value Date/Time    MG 2.10 03/15/2022 06:09 AM     Phosphorus:  No components found for: PO4  Calcium:  No results found for: CA  CBC:    Lab Results   Component Value Date/Time    WBC 6.8 01/06/2023 06:25 AM    RBC 3.66 01/06/2023 06:25 AM    HGB 11.3 01/06/2023 06:25 AM    HCT 32.9 01/06/2023 06:25 AM    MCV 90.0 01/06/2023 06:25 AM    RDW 12.1 01/06/2023 06:25 AM     01/06/2023 06:25 AM     DIFF:  Lab Results   Component Value Date/Time MCV 90.0 01/06/2023 06:25 AM    RDW 12.1 01/06/2023 06:25 AM          IMAGING:    Narrative   EXAMINATION:   CT OF THE ABDOMEN AND PELVIS WITHOUT CONTRAST 1/5/2023 1:32 pm       TECHNIQUE:   CT of the abdomen and pelvis was performed without the administration of   intravenous contrast. Multiplanar reformatted images are provided for review. Automated exposure control, iterative reconstruction, and/or weight based   adjustment of the mA/kV was utilized to reduce the radiation dose to as low   as reasonably achievable. COMPARISON:   CT abdomen/pelvis dated 9 November 2022       HISTORY:   ORDERING SYSTEM PROVIDED HISTORY: ureteral stent   TECHNOLOGIST PROVIDED HISTORY:   Reason for exam:->ureteral stent   Additional Contrast?->None   Decision Support Exception - unselect if not a suspected or confirmed   emergency medical condition->Emergency Medical Condition (MA)   Is the patient pregnant?->No   Reason for Exam: ureteral stent       FINDINGS:   Lower Chest: Lung bases are clear       Organs: Limited evaluation of the abdominal organs due to lack of intravenous   contrast.  The liver and gallbladder are normal.  The spleen and pancreas are   normal.  Adrenal glands are normal.  The right kidney is normal.  There is a   ureteral stent on the left. The proximal end is coiled within the renal   pelvis. The distal end is coiled within the urinary bladder. No evidence of   renal stones. There is no hydronephrosis on the left. However, there is a   small amount of air within the left renal pelvis. No perinephric fat   stranding. GI/Bowel: Small hiatal hernia hernia. Stomach and small bowel loops appear   normal without evidence of obstruction. Colonic loops appear normal without   evidence of wall thickening or surrounding inflammatory change. Pelvis: There is a small amount of air within the urinary bladder. Status   post hysterectomy.   Again noted within the left deep pelvis in close association to the vaginal cuff there is a soft tissue mass which measures   5.5 x 4.4 cm (3.7 x 3.1 cm previously). The mass abuts the left ureter and   is seen in close approximation to the adjacent urinary bladder. Trace free   fluid. Peritoneum/Retroperitoneum: The aorta is normal caliber. No lymphadenopathy. Bones/Soft Tissues: No suspicious osseous lesion           Impression   1. Interval enlargement of the previously identified left pelvic soft tissue   mass. 2.  There is a left ureteral stent in place. There is no evidence of   hydronephrosis on the left. However, there is a small amount of air within   the left renal pelvis. There is also a small amount of air within the   urinary bladder. This could be secondary to recent instrumentation. However, if there is no recent history of instrumentation then this could   represent a bladder/vaginal fistula possibly related to the left pelvic mass   which abuts the vagina or possibly a fistula between the urinary bladder and   adjacent sigmoid colon. 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 She has:    1. Metastatic cervical cancer:  -Treatment as described in HPI. -Patient does not want any treatment. -Multiple oncology physicians have had many conversations with the patient about treatment.  She is aware that without treatment of her cancer it will continue to progress and eventually cause her death.   -She has agreed to hospice.   -Palliative care is following. 2. UTI with suspected pyelonephritis:  -L hydronephrosis s/p stent placement 1/3/2023   -Urology consulted. -Receiving rocephin. Dispo: Patient will discharge with hospice. This plan was discussed with the patient and he/she verbalized understanding. Thank you for allowing us to participate in the care of this patient. Sarath Snow, [facility]  Oncology Hematology [address] [place] [address].  [phone]      Patient was seen with [facility] in the morning. Neoplastic pain. Recent stent placement. Progressive metastatic cervical carcinoma. I had multiple communications with Dr. Zak Card and Dr. Jack Adames. It was recommended for her to receive systemic therapy. However she has not been interested. Again explained to the patient. She continues to refuse any treatment. She is agreeable to comfort care with hospice. Philippe Gould.  Krista Rose MD, [place] [address]  Hematology and Oncology  [facility]  [phone]

## 2023-01-06 NOTE — RT PROTOCOL NOTE
RT Nebulizer Bronchodilator Protocol Note    There is a bronchodilator order in the chart from a provider indicating to follow the RT Bronchodilator Protocol and there is an Initiate RT Bronchodilator Protocol order as well (see protocol at bottom of note). CXR Findings:  No results found. The findings from the last RT Protocol Assessment were as follows:  Smoking: None or smoker <15 pack years  Respiratory Pattern: Regular pattern and RR 12-20 bpm  Breath Sounds: Intermittent or unilateral wheezes  Cough: Strong, spontaneous, non-productive  Indication for Bronchodilator Therapy:    Bronchodilator Assessment Score: 4    Aerosolized bronchodilator medication orders have been revised according to the RT Nebulizer Bronchodilator Protocol below. Respiratory Therapist to perform RT Therapy Protocol Assessment initially then follow the protocol. Repeat RT Therapy Protocol Assessment PRN for score 0-3 or on second treatment, BID, and PRN for scores above 3. No Indications - adjust the frequency to every 6 hours PRN wheezing or bronchospasm, if no treatments needed after 48 hours then discontinue using Per Protocol order mode. If indication present, adjust the RT bronchodilator orders based on the Bronchodilator Assessment Score as indicated below. If a patient is on this medication at home then do not decrease Frequency below that used at home. 0-3 - enter or revise RT bronchodilator order(s) to equivalent RT Bronchodilator order with Frequency of every 4 hours PRN for wheezing or increased work of breathing using Per Protocol order mode. 4-6 - enter or revise RT Bronchodilator order(s) to two equivalent RT bronchodilator orders with one order with BID Frequency and one order with Frequency of every 4 hours PRN wheezing or increased work of breathing using Per Protocol order mode.          7-10 - enter or revise RT Bronchodilator order(s) to two equivalent RT bronchodilator orders with one order with TID Frequency and one order with Frequency of every 4 hours PRN wheezing or increased work of breathing using Per Protocol order mode. 11-13 - enter or revise RT Bronchodilator order(s) to one equivalent RT bronchodilator order with QID Frequency and an Albuterol order with Frequency of every 4 hours PRN wheezing or increased work of breathing using Per Protocol order mode. Greater than 13 - enter or revise RT Bronchodilator order(s) to one equivalent RT bronchodilator order with every 4 hours Frequency and an Albuterol order with Frequency of every 2 hours PRN wheezing or increased work of breathing using Per Protocol order mode. RT to enter RT Home Evaluation for COPD & MDI Assessment order using Per Protocol order mode.     Electronically signed by Radha Stovall RCP on 1/6/2023 at 1:25 AM

## 2023-01-06 NOTE — PROGRESS NOTES
4 Eyes Admission Assessment     I agree as the admission nurse that 2 RN's have performed a thorough Head to Toe Skin Assessment on the patient. ALL assessment sites listed below have been assessed on admission. Areas assessed by both nurses:   []   Head, Face, and Ears   []   Shoulders, Back, and Chest  []   Arms, Elbows, and Hands   []   Coccyx, Sacrum, and Ischium  []   Legs, Feet, and Heels      No skin issues noted  Does the Patient have Skin Breakdown?   No         Fredrick Prevention initiated:  No   Wound Care Orders initiated:  No      [facility] nurse consulted for Pressure Injury (Stage 3,4, Unstageable, DTI, NWPT, and Complex wounds) or Fredrick score 18 or lower:  No      Nurse 1 eSignature: Electronically signed by Pietro Keita RN on 1/6/23 at 2:35 AM EST    **SHARE this note so that the co-signing nurse is able to place an eSignature**    Nurse 2 eSignature: Electronically signed by Vicki Pathak RN on 1/6/23 at 2:36 AM EST

## 2023-01-06 NOTE — CONSULTS
PALLIATIVE MEDICINE CONSULTATION     Patient name:Connie Collado   NBS:[id number]    :1990  Room/Bed:[facility]-5581/5581-01   LOS: 1 day         Date of consult:2023    Consult Information  Palliative Medicine Consult performed by: TAYE Bustillos CNP, CNP    Inpatient consult to Palliative Care  Consult performed by: TAYE Bustillos CNP  Consult ordered by: Ivonne Hernandez MD  Reason for consult: GOC and code status             ASSESSMENT/RECOMMENDATIONS     29 y.o. female with back pain and debility d/t metastatic cervical cancer      Symptom Management:  Back pain- related to cancer and stent placement, pt c/o 8/10 pain 1 hour after morphine IV and with Percocet. Her pain is constant and uncontrolled will add Oxycontin 20mg BID and oxycodone 5-10mg Q 3 hours.    Debility- pt has been needing assistance with ADLs due to pain   Goals of Care- talked to pt and father at bedside regarding what her goal is pt is clear that she wants symptom management only no additional Tx, tests or procedures we had a melissa discussion that without Tx that her cancer will advance and she will die she is aware of this and states that when its her time she is ready updated to [address] at her request and made referral to Hospice     Patient/Family Goals of Care :    talked to pt and father at bedside regarding what her goal is pt is clear that she wants symptom management only no additional Tx, tests or procedures we had a melissa discussion that without Tx that her cancer will advance and she will die she is aware of this and states that when its her time she is ready updated to [address] at her request and made referral to Hospice     Disposition/Discharge Plan:   pending    Advance Directives:    Advance Care Planning   The patient has the following advanced directives on file:  Advance Directives       Power of  Living Will ACP-Advance Directive ACP-Power of     Not on File Filed on 16 Filed Not on File            The patient has appointed the following active healthcare agents:  Lynsey Tillman- parent    The Patient has the following current code status:    Code Status: DNR-CC            Case discussed with: patient, floor RN, Dr Ocampo Brought  Thank you for allowing us to participate in the care of this patient. HISTORY     CC: back pain  HPI: The patient is a 29 y.o. female with advanced cervical cancer, metastasizing to the left ovary, with left hydronephrosis. Dr. Emily Villar had attempted cysto retrograde stent placement but unable to get stent up. Recently had a LEFT antegrade stent insertion per IR 01/03/2022. She is admitted with severe pelvic pain and left flank pain associated with nausea and vomiting. UA is possibly infected, although possibly contaminated as well. Air in the kidney may be 2/2 to recent instrumentation vs pyelonephritis. She does not want further care, she just wants palliative/hospice care. Palliative Medicine SymptomScreening/ROS:    Review of Systems   Constitutional:  Positive for activity change, appetite change and fatigue. Respiratory:  Positive for shortness of breath. Gastrointestinal:  Positive for abdominal pain. Genitourinary:  Positive for flank pain. Musculoskeletal:  Positive for back pain. Skin:  Positive for pallor. Neurological:  Positive for weakness. All other systems reviewed and are negative. Palliative Performance Scale:     70%       Home med list and hospital medications reviewed in chart as of 1/6/2023     EXAM     Vitals:    01/06/23 1700   BP: 99/65   Pulse: 90   Resp: 16   Temp: 98.2 °F (36.8 °C)   SpO2: 96%       Physical Exam  Constitutional:       Appearance: She is ill-appearing. HENT:      Head: Normocephalic and atraumatic. Nose: No congestion. Mouth/Throat:      Mouth: Mucous membranes are dry. Eyes:      Pupils: Pupils are equal, round, and reactive to light. Cardiovascular:      Rate and Rhythm: Normal rate. Heart sounds: No murmur heard. No friction rub. No gallop. Pulmonary:      Effort: No respiratory distress. Abdominal:      Palpations: Abdomen is soft. Musculoskeletal:      Cervical back: Normal range of motion. Right lower leg: Edema present. Left lower leg: Edema present. Skin:     General: Skin is warm and dry. Coloration: Skin is pale. Neurological:      General: No focal deficit present.               Current labs in the epic chart reviewed as of 1/6/2023   Review of previous notes, admits, labs, radiology and testing relevant to this consult done in this chart today 1/6/2023      Total time: 70 minutes  >50% of time spent counseling patient at bedside or POA/family member if applicable , reviewing information and discussing care, coordinating with care team  Signed By: Electronically signed by TAYE Sims CNP on 1/6/2023 at 5:07 PM  Palliative Medicine   [phone]    January 6, 2023

## 2023-01-06 NOTE — PROGRESS NOTES
I was told  patient prefers to switch over to hospitalist service , Dr Kvng Cordero has been notified and given a check out in person he will assume the care , I will sign off

## 2023-01-06 NOTE — PROGRESS NOTES
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

## 2023-01-06 NOTE — PROGRESS NOTES
Pt requesting for the hospitalist to take over her care.  Dr. Caballero Friends notified via perfect serve

## 2023-01-06 NOTE — PROGRESS NOTES
Pt C/o severe constipation, had senokot and miralax this evening. Pt requesting dulcolax. Dr. Park Basecandelario notified.  New order on chart

## 2023-01-06 NOTE — PROGRESS NOTES
Pt admitted to 5581. AO, independent in room, RA, VSS. Denies pain currently. Denies needs at this time.  Call light in reach

## 2023-01-06 NOTE — H&P
[facility]                     [facility], [address]                              HISTORY AND PHYSICAL    PATIENT NAME: Lily Temple                      :        1990  MED REC NO:   [id number]                          ROOM:       56  ACCOUNT NO:   [de-identified]                           ADMIT DATE: 2023  PROVIDER:     Barbara Gurrola MD    HISTORY OF PRESENT ILLNESS:  The patient is a 28-year-old white American  lady, who came to the emergency room with a known history of advanced  cervical cancer, metastasizing to the ovary. The patient recently had a  stent placement 2 days ago. Now she is experiencing excruciating pelvic  pain and lower abdominal pain. There is no gross hematuria and maybe  low-grade fever. The patient underwent CT scan of the _____ that may  indicate possibility of pyelonephritis, although by the time I  evaluated, her blood pressure and temperature were within normal limit. The patient is very upset. She does not want any further care. She  just wants palliative care. She also wants hospice care. PAST MEDICAL HISTORY:  Pertinent for cervical cancer, ADHD, morbid  obesity, bipolar disease, depression, irregular periods. PAST SURGICAL HISTORY:  Pertinent for stomach surgery, cervical biopsy,  lip procedure, total abdominal hysterectomy with bilateral  salpingo-oophorectomy, CT biopsy of the abdomen and retroperitoneum  IR-guided, ureteric stent placement, wisdom tooth extraction. MEDICATIONS:  The patient is on acetaminophen, albuterol, apixaban,  Symbicort, calcium with vitamin D, gabapentin, Motrin, loratadine,  Nasonex, multivitamin, tamsulosin, and trazodone. ALLERGIES:  No known allergies. SOCIAL HISTORY:  She is a single woman, does not have any child. She  was always a nonsmoker. She would have social alcohol use.   She used to  work at [employer] at the [employer] terminal as a  and  . There is history of marijuana abuse documented in Care  Everywhere. There is no other known history of illicit substance abuse. She certainly denies it. FAMILY HISTORY:  Both the parents are alive, present in the room. Mother has some kind of cancer. Father has high blood pressure. REVIEW OF SYSTEMS:  Review of system is negative for loss of  consciousness. No convulsions. The patient is very irritable, refuses  to even talk to me. Still moderately obese. No convulsions. No  headache. No dysphagia. Appetite is poor. No angina pectoris. Does  have exertional shortness of breath. Does have severe lower abdominal  pain. Does have pelvic pain. No genital bleeding. No hematuria. No  fever, no chills. Does have chronic musculoskeletal pain. PHYSICAL EXAMINATION:  GENERAL:  Alert, awake, oriented x3. A 29year-old irritable white  woman looking consistent with her stated age. VITAL SIGNS:  Her temperature is 97.5, blood pressure 111/67,  respirations 20, heart rate is 110, O2 sat 97% on room air. Pain is  10/10. HEENT:  Oral mucosa dry. SKIN:  Skin is warm and dry. NECK:  Neck is supple. No jugular venous distention. LUNGS:  Lungs are clear to auscultation. HEART:  Regular rate and rhythm. S1, S2 without any S3 or S4 gallop. ABDOMEN:  Soft. There is tenderness in the lower quadrant. Bowel  sounds are present. EXTREMITIES:  Shows trace edema. NEUROLOGICAL:  The patient appears grossly intact. LABORATORY DATA:  Lab evaluation shows sodium 132, potassium 3.8,  chloride 97, CO2 of 28, BUN 10, creatinine 0.9, lactic acid level is  0.9, blood glucose is 104. Liver panel within normal limit. White  blood cell count 8.3, hemoglobin/hematocrit is 12.7 and 38.4, platelet  count 868. Urinalysis shows dark urine, large leukocyte esterase, 71  wbc's, 1356 rbc's, 1+ bacteria. IMAGING DATA:  CT scan of the abdomen is consistent with no  hydronephrosis.   There is a pelvic mass present, which most probably is  consistent with recently-diagnosed ovarian metastasis. The patient does  have air in the renal pelvis, that was consistent with recent  instrumentation. The patient may be consistent with pyelonephritis. ASSESSMENT:  Acute pyelonephritis, metastatic ovarian disease,  uncontrolled pain, anxiety neurosis, moderate obesity. PLAN:  Plan is get her admitted. Treat her with IV hydration, IV  ceftriaxone, urine culture and sensitivity. She has opted for  palliative care and hospice consult. We will also give her IV morphine  4 mg every 2 hours p.r.n., Zofran p.r.n., Ativan 1 mg IV q. 4 hours  p.r.n. When I tried to ask her about code status, she was angry. She felt like  I was condescending to her, which I certainly was not. I maintained a  very polite and very professional attire throughout our encounter.         Valerie Espitia MD    D: 01/05/2023 19:13:02       T: 01/05/2023 19:17:00     [email or web address]  Job#: [id number]     Doc#: [id number]    CC:

## 2023-01-07 PROCEDURE — 1200000000 HC SEMI PRIVATE

## 2023-01-07 PROCEDURE — 6370000000 HC RX 637 (ALT 250 FOR IP): Performed by: NURSE PRACTITIONER

## 2023-01-07 PROCEDURE — 6370000000 HC RX 637 (ALT 250 FOR IP): Performed by: INTERNAL MEDICINE

## 2023-01-07 PROCEDURE — 6370000000 HC RX 637 (ALT 250 FOR IP): Performed by: CLINICAL NURSE SPECIALIST

## 2023-01-07 PROCEDURE — 94640 AIRWAY INHALATION TREATMENT: CPT

## 2023-01-07 PROCEDURE — 2580000003 HC RX 258: Performed by: INTERNAL MEDICINE

## 2023-01-07 PROCEDURE — 6360000002 HC RX W HCPCS: Performed by: INTERNAL MEDICINE

## 2023-01-07 RX ORDER — DOCUSATE SODIUM 100 MG/1
100 CAPSULE, LIQUID FILLED ORAL DAILY
Status: DISCONTINUED | OUTPATIENT
Start: 2023-01-07 | End: 2023-01-10 | Stop reason: HOSPADM

## 2023-01-07 RX ORDER — BISACODYL 10 MG
10 SUPPOSITORY, RECTAL RECTAL DAILY PRN
Status: DISCONTINUED | OUTPATIENT
Start: 2023-01-07 | End: 2023-01-10 | Stop reason: HOSPADM

## 2023-01-07 RX ADMIN — CETIRIZINE HYDROCHLORIDE 5 MG: 10 TABLET, FILM COATED ORAL at 07:45

## 2023-01-07 RX ADMIN — TAMSULOSIN HYDROCHLORIDE 0.4 MG: 0.4 CAPSULE ORAL at 07:44

## 2023-01-07 RX ADMIN — PHENAZOPYRIDINE 200 MG: 100 TABLET ORAL at 17:19

## 2023-01-07 RX ADMIN — PANTOPRAZOLE SODIUM 40 MG: 40 TABLET, DELAYED RELEASE ORAL at 15:42

## 2023-01-07 RX ADMIN — OXYBUTYNIN CHLORIDE 5 MG: 5 TABLET ORAL at 07:44

## 2023-01-07 RX ADMIN — OXYCODONE 10 MG: 5 TABLET ORAL at 17:18

## 2023-01-07 RX ADMIN — PHENAZOPYRIDINE 200 MG: 100 TABLET ORAL at 07:43

## 2023-01-07 RX ADMIN — PANTOPRAZOLE SODIUM 40 MG: 40 TABLET, DELAYED RELEASE ORAL at 06:00

## 2023-01-07 RX ADMIN — NALOXEGOL OXALATE 25 MG: 25 TABLET, FILM COATED ORAL at 07:44

## 2023-01-07 RX ADMIN — FLUTICASONE PROPIONATE 2 SPRAY: 50 SPRAY, METERED NASAL at 07:44

## 2023-01-07 RX ADMIN — MORPHINE SULFATE 4 MG: 4 INJECTION, SOLUTION INTRAMUSCULAR; INTRAVENOUS at 09:46

## 2023-01-07 RX ADMIN — TRAZODONE HYDROCHLORIDE 50 MG: 50 TABLET ORAL at 20:17

## 2023-01-07 RX ADMIN — MORPHINE SULFATE 4 MG: 4 INJECTION, SOLUTION INTRAMUSCULAR; INTRAVENOUS at 00:25

## 2023-01-07 RX ADMIN — GABAPENTIN 300 MG: 300 CAPSULE ORAL at 20:17

## 2023-01-07 RX ADMIN — POLYETHYLENE GLYCOL 3350 17 G: 17 POWDER, FOR SOLUTION ORAL at 20:17

## 2023-01-07 RX ADMIN — Medication 2 PUFF: at 21:36

## 2023-01-07 RX ADMIN — MORPHINE SULFATE 4 MG: 4 INJECTION, SOLUTION INTRAMUSCULAR; INTRAVENOUS at 12:11

## 2023-01-07 RX ADMIN — MORPHINE SULFATE 4 MG: 4 INJECTION, SOLUTION INTRAMUSCULAR; INTRAVENOUS at 18:14

## 2023-01-07 RX ADMIN — SENNOSIDES 17.2 MG: 8.6 TABLET, FILM COATED ORAL at 20:17

## 2023-01-07 RX ADMIN — OXYCODONE 10 MG: 5 TABLET ORAL at 21:30

## 2023-01-07 RX ADMIN — POLYETHYLENE GLYCOL 3350 17 G: 17 POWDER, FOR SOLUTION ORAL at 07:44

## 2023-01-07 RX ADMIN — OXYCODONE 10 MG: 5 TABLET ORAL at 02:22

## 2023-01-07 RX ADMIN — MORPHINE SULFATE 4 MG: 4 INJECTION, SOLUTION INTRAMUSCULAR; INTRAVENOUS at 23:06

## 2023-01-07 RX ADMIN — OXYBUTYNIN CHLORIDE 5 MG: 5 TABLET ORAL at 20:17

## 2023-01-07 RX ADMIN — OXYBUTYNIN CHLORIDE 5 MG: 5 TABLET ORAL at 13:25

## 2023-01-07 RX ADMIN — ENOXAPARIN SODIUM 40 MG: 100 INJECTION SUBCUTANEOUS at 07:43

## 2023-01-07 RX ADMIN — CEFTRIAXONE SODIUM 1000 MG: 1 INJECTION, POWDER, FOR SOLUTION INTRAMUSCULAR; INTRAVENOUS at 20:26

## 2023-01-07 RX ADMIN — POLYETHYLENE GLYCOL 3350 17 G: 17 POWDER, FOR SOLUTION ORAL at 13:35

## 2023-01-07 RX ADMIN — MORPHINE SULFATE 4 MG: 4 INJECTION, SOLUTION INTRAMUSCULAR; INTRAVENOUS at 15:42

## 2023-01-07 RX ADMIN — OXYCODONE HYDROCHLORIDE 20 MG: 20 TABLET, FILM COATED, EXTENDED RELEASE ORAL at 20:17

## 2023-01-07 RX ADMIN — SENNOSIDES 17.2 MG: 8.6 TABLET, FILM COATED ORAL at 07:44

## 2023-01-07 RX ADMIN — BISACODYL 5 MG: 5 TABLET, COATED ORAL at 07:44

## 2023-01-07 RX ADMIN — OXYCODONE HYDROCHLORIDE 20 MG: 20 TABLET, FILM COATED, EXTENDED RELEASE ORAL at 07:44

## 2023-01-07 RX ADMIN — PHENAZOPYRIDINE 200 MG: 100 TABLET ORAL at 12:08

## 2023-01-07 RX ADMIN — BISACODYL 10 MG: 10 SUPPOSITORY RECTAL at 21:30

## 2023-01-07 RX ADMIN — Medication 2 PUFF: at 08:30

## 2023-01-07 RX ADMIN — DOCUSATE SODIUM 100 MG: 100 CAPSULE, LIQUID FILLED ORAL at 13:25

## 2023-01-07 RX ADMIN — OXYCODONE 10 MG: 5 TABLET ORAL at 11:02

## 2023-01-07 RX ADMIN — MORPHINE SULFATE 4 MG: 4 INJECTION, SOLUTION INTRAMUSCULAR; INTRAVENOUS at 03:49

## 2023-01-07 RX ADMIN — MORPHINE SULFATE 4 MG: 4 INJECTION, SOLUTION INTRAMUSCULAR; INTRAVENOUS at 20:17

## 2023-01-07 RX ADMIN — OXYCODONE 10 MG: 5 TABLET ORAL at 14:21

## 2023-01-07 RX ADMIN — MORPHINE SULFATE 4 MG: 4 INJECTION, SOLUTION INTRAMUSCULAR; INTRAVENOUS at 07:45

## 2023-01-07 RX ADMIN — OXYCODONE 10 MG: 5 TABLET ORAL at 05:59

## 2023-01-07 ASSESSMENT — PAIN DESCRIPTION - FREQUENCY: FREQUENCY: CONTINUOUS

## 2023-01-07 ASSESSMENT — PAIN SCALES - GENERAL
PAINLEVEL_OUTOF10: 7
PAINLEVEL_OUTOF10: 8
PAINLEVEL_OUTOF10: 9
PAINLEVEL_OUTOF10: 8
PAINLEVEL_OUTOF10: 7
PAINLEVEL_OUTOF10: 8
PAINLEVEL_OUTOF10: 8

## 2023-01-07 ASSESSMENT — PAIN DESCRIPTION - PAIN TYPE
TYPE: CHRONIC PAIN
TYPE: CHRONIC PAIN
TYPE: ACUTE PAIN

## 2023-01-07 ASSESSMENT — PAIN DESCRIPTION - LOCATION
LOCATION: BACK
LOCATION: ABDOMEN
LOCATION: ABDOMEN
LOCATION: BACK
LOCATION: BACK
LOCATION: ABDOMEN

## 2023-01-07 ASSESSMENT — PAIN DESCRIPTION - ORIENTATION
ORIENTATION: LOWER

## 2023-01-07 ASSESSMENT — PAIN - FUNCTIONAL ASSESSMENT
PAIN_FUNCTIONAL_ASSESSMENT: PREVENTS OR INTERFERES SOME ACTIVE ACTIVITIES AND ADLS
PAIN_FUNCTIONAL_ASSESSMENT: PREVENTS OR INTERFERES WITH MANY ACTIVE NOT PASSIVE ACTIVITIES
PAIN_FUNCTIONAL_ASSESSMENT: ACTIVITIES ARE NOT PREVENTED
PAIN_FUNCTIONAL_ASSESSMENT: ACTIVITIES ARE NOT PREVENTED

## 2023-01-07 ASSESSMENT — PAIN DESCRIPTION - DESCRIPTORS
DESCRIPTORS: DISCOMFORT
DESCRIPTORS: CRAMPING
DESCRIPTORS: HEAVINESS;DISCOMFORT
DESCRIPTORS: DISCOMFORT
DESCRIPTORS: CRAMPING;CRUSHING;DISCOMFORT

## 2023-01-07 ASSESSMENT — PAIN DESCRIPTION - ONSET: ONSET: ON-GOING

## 2023-01-07 NOTE — PROGRESS NOTES
[facility]    Met with pt and her parents regarding hospice services. Pt reports her goal is to get her pain controlled and return home and to work. Discussed hospice inpatient unit for a short term stay to manage pain. Pt reports that she prefers to wait and see if pain improves once antibiotic is completed and infection is treated. Plan is to follow up with pt tomorrow.     Thank you,    Nella Martin, RN  [phone] Quinolones Counseling:  I discussed with the patient the risks of fluoroquinolones including but not limited to GI upset, allergic reaction, drug rash, diarrhea, dizziness, photosensitivity, yeast infections, liver function test abnormalities, tendonitis/tendon rupture.

## 2023-01-07 NOTE — PROGRESS NOTES
Hospitalist Progress Note      PCP: Sheryl Krishnan MD    Date of Admission: 1/5/2023    Chief Complaint: Diffuse pain      Subjective:   Continues to have persistent L flank and abdominal pain. Intermittent nausea but no emesis    Medications:  Reviewed    Infusion Medications   Scheduled Medications    docusate sodium  100 mg Oral Daily    oxybutynin  5 mg Oral TID    oxyCODONE  20 mg Oral 2 times per day    naloxegol  25 mg Oral QAM AC    polyethylene glycol  17 g Oral TID    cefTRIAXone (ROCEPHIN) IV  1,000 mg IntraVENous Q24H    albuterol sulfate HFA  2 puff Inhalation 4x daily    mometasone-formoterol  2 puff Inhalation BID    gabapentin  300 mg Oral Nightly    cetirizine  5 mg Oral Daily    fluticasone  2 spray Each Nostril Daily    senna  2 tablet Oral BID    tamsulosin  0.4 mg Oral Daily    traZODone  50 mg Oral Nightly    enoxaparin  40 mg SubCUTAneous Daily    pantoprazole  40 mg Oral BID AC    phenazopyridine  200 mg Oral TID WC     PRN Meds: bisacodyl, oxyCODONE, oxyCODONE, sennosides-docusate sodium, acetaminophen, albuterol, ondansetron, morphine, LORazepam      Intake/Output Summary (Last 24 hours) at 1/7/2023 1353  Last data filed at 1/7/2023 1338  Gross per 24 hour   Intake 480 ml   Output --   Net 480 ml         Exam:    /69   Pulse 86   Temp 98.3 °F (36.8 °C) (Oral)   Resp 16   Ht 5' 7\" (1.702 m)   Wt 189 lb 8 oz (86 kg)   SpO2 95%   BMI 29.68 kg/m²     Gen/overall appearance: Not in acute distress. Alert. Head: Normocephalic, atraumatic  Eyes: EOMI, no scleral icterus  CVS: regular rhythm, Normal S1S2  Pulm: Clear to auscultation bilaterally. No crackles/wheezes  Gastrointestinal: Soft, L flank pain, no guarding or rebound  Extremities: No edema.  No erythema or warmth  Neuro: No gross focal deficits noted  Skin: Warm, dry    Labs:   Recent Labs     01/05/23  1158 01/06/23  0625   WBC 8.3 6.8   HGB 12.7 11.3*   HCT 38.4 32.9*    263       Recent Labs 01/05/23  1158 01/06/23  0625   * 134*   K 3.8 3.9   CL 97* 99   CO2 28 27   BUN 10 10   CREATININE 0.9 0.8   CALCIUM 9.5 9.2       Recent Labs     01/05/23  1158   AST 15   ALT 9*   BILITOT 0.5   ALKPHOS 58       No results for input(s): INR in the last 72 hours. No results for input(s): Kvng Beets in the last 72 hours. Assessment/Plan:    Active Hospital Problems    Diagnosis Date Noted    UTI (urinary tract infection) [N39.0] 01/05/2023     Priority: Medium     L flank and abdominal pain  Metastatic cervical cancer  L hydronephrosis s/p stent placement 1/3/2023  - pain management  - urology consulted and following  - palliative and hospice discussing    UTI with suspected pyelonephritis  - on IV rocephin  - follow up cultures  - IVF hydration    Consitipation  - aggressive bowel regimen    PMH of morbid obesity, anxiety    DVT Prophylaxis: lovenox  Diet: ADULT DIET;  Regular  Code Status: DNR-CC      Ghazala Huang MD

## 2023-01-07 NOTE — PLAN OF CARE
Problem: Pain  Goal: Verbalizes/displays adequate comfort level or baseline comfort level  1/7/2023 0130 by Karissa Jimenez RN  Outcome: Progressing     Problem: Safety - Adult  Goal: Free from fall injury  Outcome: Progressing     Problem: ABCDS Injury Assessment  Goal: Absence of physical injury  Outcome: Progressing

## 2023-01-07 NOTE — PROGRESS NOTES
Shift assessment completed. Routine vitals stable. Scheduled medications given. Patient is awake, alert and oriented. Respirations are easy and unlabored. Patient expresses 8/10 pain, PRN morphine administered. Pt remains resting in bed, independent in room. No other needs expressed. Call light in reach. Dose Per Fractionation In Cgy (Optional): 274.56

## 2023-01-07 NOTE — PLAN OF CARE
Problem: Safety - Adult  Goal: Free from fall injury  1/7/2023 1010 by Yusef Hdz RN  Outcome: Progressing  1/7/2023 0130 by Logan Lam RN  Outcome: Progressing     Problem: ABCDS Injury Assessment  Goal: Absence of physical injury  1/7/2023 1010 by Yusef Hdz RN  Outcome: Progressing  1/7/2023 0130 by Logan Lam RN  Outcome: Progressing     Problem: Pain  Goal: Verbalizes/displays adequate comfort level or baseline comfort level  1/7/2023 1010 by Yusef Hdz RN  Outcome: Not Progressing  1/7/2023 0130 by Logan Lam RN  Outcome: Progressing

## 2023-01-07 NOTE — PROGRESS NOTES
Urology Progress Note  [facility]    Provider: Raisa Blanco MD MD Patient ID:  Admission Date: 2023 Name: Lan Juarez  OR Date: 2023 MRN: [id number]   Patient Location: [facility]-[id number] : 1990  Attending: Reuben Rodriguez MD Date of Service: 2023  PCP: Laure Padron MD     Reason for Consult: Flank Pain     ASSESSMENT/PLAN      30 yo female with hx of of advanced cervical cancer, metastasizing to the left ovary, with left hydronephrosis. Dr. Charles Hurd had attempted cysto retrograde stent placement but unable to get stent up. Recently had a LEFT antegrade stent insertion per IR 2022. She is admitted with severe pelvic pain and left flank pain associated with nausea and vomiting. UA is possibly infected, although possibly contaminated as well. Air in the kidney may be 2/2 to recent instrumentation vs pyelonephritis. She does not want further care, she just wants palliative/hospice care.   ==  Ucx NGTD. Still with significant pain from stent     Recommendations:  - Discussed options of 1. observation (favored) with continued pyridium, oxybutynin, flomax and narcotics. 2. Downsizing of stent to 6 Fr from 8 and maybe 24 cm from 26 given her height. 3. Removal of stent completely (known risks of worsening pain, renal failure, needs for repeat IR procedure and inability to replace the tube) 4. Stent removal with PNT placement alone (she is refusing this)  - follow up final Ucx  - appreciate palliative/hospice  - continue stent pain medications  - offered stent exchange Monday she declined  - if still severe pain in ~1-2 weeks next step likely stent exchange/downsize     All the patients questions were answered. She understands the plan as listed above. Subjective:   Lan Juarez is a 29 y.o. female. She was seen and examined this morning.  Today we discussed all stent options      Objective:   Vitals:  Vitals:    23 1159   BP: 107/69   Pulse: 86   Resp: 16 Temp: 98.3 °F (36.8 °C)   SpO2: 95%       Intake/Output Summary (Last 24 hours) at 1/7/2023 1234  Last data filed at 1/7/2023 0947  Gross per 24 hour   Intake 240 ml   Output --   Net 240 ml     Physical Exam:  Gen: Alert and oriented x3, no acute distress      Labs:  Lab Results   Component Value Date    WBC 6.8 01/06/2023    HGB 11.3 (L) 01/06/2023    HCT 32.9 (L) 01/06/2023    MCV 90.0 01/06/2023     01/06/2023     Lab Results   Component Value Date    CREATININE 0.8 01/06/2023    BUN 10 01/06/2023     (L) 01/06/2023    K 3.9 01/06/2023    CL 99 01/06/2023    CO2 27 01/06/2023       [place] MD LIVAN Gutiérrez  1/7/2023

## 2023-01-08 LAB
ANION GAP SERPL CALCULATED.3IONS-SCNC: 7 MMOL/L (ref 3–16)
BASOPHILS ABSOLUTE: 0.1 K/UL (ref 0–0.2)
BASOPHILS RELATIVE PERCENT: 0.8 %
BUN BLDV-MCNC: 15 MG/DL (ref 7–20)
CALCIUM SERPL-MCNC: 9.3 MG/DL (ref 8.3–10.6)
CHLORIDE BLD-SCNC: 101 MMOL/L (ref 99–110)
CO2: 28 MMOL/L (ref 21–32)
CREAT SERPL-MCNC: 0.9 MG/DL (ref 0.6–1.1)
EOSINOPHILS ABSOLUTE: 0.9 K/UL (ref 0–0.6)
EOSINOPHILS RELATIVE PERCENT: 11.2 %
GFR SERPL CREATININE-BSD FRML MDRD: >60 ML/MIN/{1.73_M2}
GLUCOSE BLD-MCNC: 89 MG/DL (ref 70–99)
HCT VFR BLD CALC: 35.3 % (ref 36–48)
HEMOGLOBIN: 11.7 G/DL (ref 12–16)
LYMPHOCYTES ABSOLUTE: 1.9 K/UL (ref 1–5.1)
LYMPHOCYTES RELATIVE PERCENT: 23.2 %
MCH RBC QN AUTO: 29.7 PG (ref 26–34)
MCHC RBC AUTO-ENTMCNC: 33 G/DL (ref 31–36)
MCV RBC AUTO: 90 FL (ref 80–100)
MONOCYTES ABSOLUTE: 0.6 K/UL (ref 0–1.3)
MONOCYTES RELATIVE PERCENT: 6.7 %
NEUTROPHILS ABSOLUTE: 4.9 K/UL (ref 1.7–7.7)
NEUTROPHILS RELATIVE PERCENT: 58.1 %
PDW BLD-RTO: 12.2 % (ref 12.4–15.4)
PLATELET # BLD: 310 K/UL (ref 135–450)
PMV BLD AUTO: 8.3 FL (ref 5–10.5)
POTASSIUM SERPL-SCNC: 4.3 MMOL/L (ref 3.5–5.1)
RBC # BLD: 3.93 M/UL (ref 4–5.2)
SODIUM BLD-SCNC: 136 MMOL/L (ref 136–145)
WBC # BLD: 8.4 K/UL (ref 4–11)

## 2023-01-08 PROCEDURE — 6360000002 HC RX W HCPCS: Performed by: INTERNAL MEDICINE

## 2023-01-08 PROCEDURE — 6370000000 HC RX 637 (ALT 250 FOR IP): Performed by: INTERNAL MEDICINE

## 2023-01-08 PROCEDURE — 94761 N-INVAS EAR/PLS OXIMETRY MLT: CPT

## 2023-01-08 PROCEDURE — 80048 BASIC METABOLIC PNL TOTAL CA: CPT

## 2023-01-08 PROCEDURE — 6370000000 HC RX 637 (ALT 250 FOR IP): Performed by: CLINICAL NURSE SPECIALIST

## 2023-01-08 PROCEDURE — 94760 N-INVAS EAR/PLS OXIMETRY 1: CPT

## 2023-01-08 PROCEDURE — 94640 AIRWAY INHALATION TREATMENT: CPT

## 2023-01-08 PROCEDURE — 6370000000 HC RX 637 (ALT 250 FOR IP): Performed by: NURSE PRACTITIONER

## 2023-01-08 PROCEDURE — 1200000000 HC SEMI PRIVATE

## 2023-01-08 PROCEDURE — 2580000003 HC RX 258: Performed by: INTERNAL MEDICINE

## 2023-01-08 PROCEDURE — 85025 COMPLETE CBC W/AUTO DIFF WBC: CPT

## 2023-01-08 PROCEDURE — 36415 COLL VENOUS BLD VENIPUNCTURE: CPT

## 2023-01-08 RX ADMIN — CEFTRIAXONE SODIUM 1000 MG: 1 INJECTION, POWDER, FOR SOLUTION INTRAMUSCULAR; INTRAVENOUS at 21:02

## 2023-01-08 RX ADMIN — PHENAZOPYRIDINE 200 MG: 100 TABLET ORAL at 08:08

## 2023-01-08 RX ADMIN — POLYETHYLENE GLYCOL 3350 17 G: 17 POWDER, FOR SOLUTION ORAL at 20:50

## 2023-01-08 RX ADMIN — MORPHINE SULFATE 4 MG: 4 INJECTION, SOLUTION INTRAMUSCULAR; INTRAVENOUS at 12:15

## 2023-01-08 RX ADMIN — PHENAZOPYRIDINE 200 MG: 100 TABLET ORAL at 11:46

## 2023-01-08 RX ADMIN — OXYCODONE 10 MG: 5 TABLET ORAL at 12:07

## 2023-01-08 RX ADMIN — PANTOPRAZOLE SODIUM 40 MG: 40 TABLET, DELAYED RELEASE ORAL at 15:52

## 2023-01-08 RX ADMIN — Medication 2 PUFF: at 10:58

## 2023-01-08 RX ADMIN — OXYCODONE 10 MG: 5 TABLET ORAL at 15:52

## 2023-01-08 RX ADMIN — PANTOPRAZOLE SODIUM 40 MG: 40 TABLET, DELAYED RELEASE ORAL at 05:14

## 2023-01-08 RX ADMIN — OXYCODONE HYDROCHLORIDE 20 MG: 20 TABLET, FILM COATED, EXTENDED RELEASE ORAL at 08:10

## 2023-01-08 RX ADMIN — OXYCODONE 10 MG: 5 TABLET ORAL at 09:06

## 2023-01-08 RX ADMIN — ENOXAPARIN SODIUM 40 MG: 100 INJECTION SUBCUTANEOUS at 08:07

## 2023-01-08 RX ADMIN — MORPHINE SULFATE 4 MG: 4 INJECTION, SOLUTION INTRAMUSCULAR; INTRAVENOUS at 05:13

## 2023-01-08 RX ADMIN — CETIRIZINE HYDROCHLORIDE 5 MG: 10 TABLET, FILM COATED ORAL at 08:08

## 2023-01-08 RX ADMIN — MORPHINE SULFATE 4 MG: 4 INJECTION, SOLUTION INTRAMUSCULAR; INTRAVENOUS at 14:21

## 2023-01-08 RX ADMIN — OXYCODONE HYDROCHLORIDE 20 MG: 20 TABLET, FILM COATED, EXTENDED RELEASE ORAL at 20:50

## 2023-01-08 RX ADMIN — OXYBUTYNIN CHLORIDE 5 MG: 5 TABLET ORAL at 12:07

## 2023-01-08 RX ADMIN — OXYBUTYNIN CHLORIDE 5 MG: 5 TABLET ORAL at 08:09

## 2023-01-08 RX ADMIN — POLYETHYLENE GLYCOL 3350 17 G: 17 POWDER, FOR SOLUTION ORAL at 12:09

## 2023-01-08 RX ADMIN — LORAZEPAM 1 MG: 2 INJECTION INTRAMUSCULAR; INTRAVENOUS at 08:18

## 2023-01-08 RX ADMIN — MORPHINE SULFATE 4 MG: 4 INJECTION, SOLUTION INTRAMUSCULAR; INTRAVENOUS at 18:54

## 2023-01-08 RX ADMIN — POLYETHYLENE GLYCOL 3350 17 G: 17 POWDER, FOR SOLUTION ORAL at 08:06

## 2023-01-08 RX ADMIN — MORPHINE SULFATE 4 MG: 4 INJECTION, SOLUTION INTRAMUSCULAR; INTRAVENOUS at 01:31

## 2023-01-08 RX ADMIN — MORPHINE SULFATE 4 MG: 4 INJECTION, SOLUTION INTRAMUSCULAR; INTRAVENOUS at 17:04

## 2023-01-08 RX ADMIN — TAMSULOSIN HYDROCHLORIDE 0.4 MG: 0.4 CAPSULE ORAL at 08:07

## 2023-01-08 RX ADMIN — DOCUSATE SODIUM 100 MG: 100 CAPSULE, LIQUID FILLED ORAL at 08:09

## 2023-01-08 RX ADMIN — OXYCODONE 10 MG: 5 TABLET ORAL at 03:11

## 2023-01-08 RX ADMIN — Medication 2 PUFF: at 21:30

## 2023-01-08 RX ADMIN — GABAPENTIN 300 MG: 300 CAPSULE ORAL at 20:50

## 2023-01-08 RX ADMIN — MORPHINE SULFATE 4 MG: 4 INJECTION, SOLUTION INTRAMUSCULAR; INTRAVENOUS at 10:31

## 2023-01-08 RX ADMIN — SENNOSIDES 17.2 MG: 8.6 TABLET, FILM COATED ORAL at 20:50

## 2023-01-08 RX ADMIN — SENNOSIDES 17.2 MG: 8.6 TABLET, FILM COATED ORAL at 08:09

## 2023-01-08 RX ADMIN — FLUTICASONE PROPIONATE 2 SPRAY: 50 SPRAY, METERED NASAL at 08:12

## 2023-01-08 RX ADMIN — NALOXEGOL OXALATE 25 MG: 25 TABLET, FILM COATED ORAL at 08:08

## 2023-01-08 RX ADMIN — MORPHINE SULFATE 4 MG: 4 INJECTION, SOLUTION INTRAMUSCULAR; INTRAVENOUS at 22:55

## 2023-01-08 RX ADMIN — OXYBUTYNIN CHLORIDE 5 MG: 5 TABLET ORAL at 20:51

## 2023-01-08 RX ADMIN — PHENAZOPYRIDINE 200 MG: 100 TABLET ORAL at 15:52

## 2023-01-08 ASSESSMENT — PAIN DESCRIPTION - LOCATION
LOCATION: BACK
LOCATION: LEG
LOCATION: ABDOMEN;BACK
LOCATION: BACK
LOCATION: FLANK
LOCATION: BACK
LOCATION: GENERALIZED
LOCATION: LEG

## 2023-01-08 ASSESSMENT — PAIN SCALES - GENERAL
PAINLEVEL_OUTOF10: 9
PAINLEVEL_OUTOF10: 5
PAINLEVEL_OUTOF10: 8
PAINLEVEL_OUTOF10: 9
PAINLEVEL_OUTOF10: 7
PAINLEVEL_OUTOF10: 7
PAINLEVEL_OUTOF10: 8
PAINLEVEL_OUTOF10: 9
PAINLEVEL_OUTOF10: 8
PAINLEVEL_OUTOF10: 7

## 2023-01-08 ASSESSMENT — PAIN DESCRIPTION - DESCRIPTORS
DESCRIPTORS: DISCOMFORT
DESCRIPTORS: DISCOMFORT
DESCRIPTORS: PINS AND NEEDLES;SHARP
DESCRIPTORS: ACHING
DESCRIPTORS: PRESSURE;SHARP
DESCRIPTORS: TINGLING;PINS AND NEEDLES;NUMBNESS

## 2023-01-08 ASSESSMENT — PAIN DESCRIPTION - ONSET
ONSET: ON-GOING

## 2023-01-08 ASSESSMENT — PAIN DESCRIPTION - ORIENTATION
ORIENTATION: RIGHT;LEFT
ORIENTATION: LOWER
ORIENTATION: LOWER
ORIENTATION: RIGHT;LEFT
ORIENTATION: LOWER
ORIENTATION: LOWER
ORIENTATION: RIGHT;LEFT

## 2023-01-08 ASSESSMENT — PAIN DESCRIPTION - FREQUENCY
FREQUENCY: CONTINUOUS

## 2023-01-08 ASSESSMENT — PAIN DESCRIPTION - PAIN TYPE: TYPE: NEUROPATHIC PAIN

## 2023-01-08 ASSESSMENT — PAIN - FUNCTIONAL ASSESSMENT
PAIN_FUNCTIONAL_ASSESSMENT: PREVENTS OR INTERFERES SOME ACTIVE ACTIVITIES AND ADLS
PAIN_FUNCTIONAL_ASSESSMENT: PREVENTS OR INTERFERES SOME ACTIVE ACTIVITIES AND ADLS

## 2023-01-08 NOTE — PROGRESS NOTES
Urology Progress Note  [facility]    Provider: Aretha Thomas MD MD Patient ID:  Admission Date: 2023 Name: Emmie Eisenmenger  OR Date: 2023 MRN: [id number]   Patient Location: [facility]-[id number] : 1990  Attending: Dior Trejo MD Date of Service: 2023  PCP: Matthew Wells MD     Reason for Consult: Flank Pain     ASSESSMENT/PLAN      30 yo female with hx of of advanced cervical cancer, metastasizing to the left ovary, with left hydronephrosis. Dr. Sonam Penaloza had attempted cysto retrograde stent placement but unable to get stent up. Recently had a LEFT antegrade stent insertion per IR 2022. She is admitted with severe pelvic pain and left flank pain associated with nausea and vomiting. UA is possibly infected, although possibly contaminated as well. Air in the kidney may be 2/2 to recent instrumentation vs pyelonephritis. She does not want further care, she just wants palliative/hospice care.   ==  Ucx NGTD. Still with significant pain from stent requiring IV narcotic     Recommendations:  - Discussed options of: 1. observation (favored) with continued pyridium, oxybutynin, flomax and narcotics. 2. Downsizing of stent to 6 Fr from 8 and maybe 24 cm from 26 given her height. 3. Removal of stent completely (known risks of worsening pain, renal failure, needs for repeat IR procedure and inability to replace the tube) 4. Stent removal with PNT placement alone (she is refusing this)  - follow up final Ucx, currently NGTD  - appreciate palliative/hospice  - continue stent pain medications  - offered stent exchange Monday; will keep NPO at midnight and will think about this can down size to 6 Fr and se if this helps      All the patients questions were answered. She understands the plan as listed above. Subjective:   Emmie Eisenmenger is a 29 y.o. female. She was seen and examined this morning.  Today we discussed all stent options        Objective:   Vitals:  Vitals: 01/08/23 1031   BP:    Pulse:    Resp: 16   Temp:    SpO2:        Intake/Output Summary (Last 24 hours) at 1/8/2023 1047  Last data filed at 1/7/2023 1338  Gross per 24 hour   Intake 240 ml   Output --   Net 240 ml     Physical Exam:  Gen: Alert and oriented x3, no acute distress      Labs:  Lab Results   Component Value Date    WBC 6.8 01/06/2023    HGB 11.3 (L) 01/06/2023    HCT 32.9 (L) 01/06/2023    MCV 90.0 01/06/2023     01/06/2023     Lab Results   Component Value Date    CREATININE 0.8 01/06/2023    BUN 10 01/06/2023     (L) 01/06/2023    K 3.9 01/06/2023    CL 99 01/06/2023    CO2 27 01/06/2023       Bridgette Byers MD MD  1/8/2023

## 2023-01-08 NOTE — PROGRESS NOTES
Message sent to Dr. Nasra Garcia (Oncology) due to pain and no improvements post IV medication or Oral prn medications. Pt voiced slight improvement post IV Ativan. Waiting on response.      Sandra Mckeon BSN, RN   [phone]

## 2023-01-08 NOTE — PROGRESS NOTES
[facility]     Met with pt and her parents regarding hospice services. Pt reports her goal is to get her pain controlled and return home and to work. Discussed hospice inpatient unit for a short term stay to manage pain. Pt reports that she prefers to wait and see if pain improves once antibiotic is completed and infection is treated. Plan is to follow up with pt tomorrow.      Thank you,     Aneudy Martin, RN  [phone]

## 2023-01-08 NOTE — PROGRESS NOTES
Hospitalist Progress Note      PCP: Александр Tomlinson MD    Date of Admission: 1/5/2023    Chief Complaint: Diffuse pain      Subjective:   Continues to have persistent L flank pain. Abd pain improved. Intermittent nausea but no emesis    Medications:  Reviewed    Infusion Medications   Scheduled Medications    docusate sodium  100 mg Oral Daily    oxybutynin  5 mg Oral TID    oxyCODONE  20 mg Oral 2 times per day    naloxegol  25 mg Oral QAM AC    polyethylene glycol  17 g Oral TID    cefTRIAXone (ROCEPHIN) IV  1,000 mg IntraVENous Q24H    mometasone-formoterol  2 puff Inhalation BID    gabapentin  300 mg Oral Nightly    cetirizine  5 mg Oral Daily    fluticasone  2 spray Each Nostril Daily    senna  2 tablet Oral BID    tamsulosin  0.4 mg Oral Daily    traZODone  50 mg Oral Nightly    enoxaparin  40 mg SubCUTAneous Daily    pantoprazole  40 mg Oral BID AC    phenazopyridine  200 mg Oral TID WC     PRN Meds: bisacodyl, oxyCODONE, oxyCODONE, sennosides-docusate sodium, acetaminophen, albuterol, ondansetron, morphine, LORazepam      Intake/Output Summary (Last 24 hours) at 1/8/2023 1624  Last data filed at 1/8/2023 1154  Gross per 24 hour   Intake 240 ml   Output --   Net 240 ml         Exam:    /67   Pulse 74   Temp 98.6 °F (37 °C) (Oral)   Resp 18   Ht 5' 7\" (1.702 m)   Wt 189 lb 8 oz (86 kg)   SpO2 94%   BMI 29.68 kg/m²     Gen/overall appearance: Not in acute distress. Alert. Head: Normocephalic, atraumatic  Eyes: EOMI, no scleral icterus  CVS: regular rhythm, Normal S1S2  Pulm: Clear to auscultation bilaterally. No crackles/wheezes  Gastrointestinal: Soft, L flank pain, no guarding or rebound  Extremities: No edema.  No erythema or warmth  Neuro: No gross focal deficits noted  Skin: Warm, dry    Labs:   Recent Labs     01/06/23  0625 01/08/23  1412   WBC 6.8 8.4   HGB 11.3* 11.7*   HCT 32.9* 35.3*    310       Recent Labs     01/06/23  0625 01/08/23  1412   * 136   K 3.9 4.3   CL 99 101   CO2 27 28   BUN 10 15   CREATININE 0.8 0.9   CALCIUM 9.2 9.3       No results for input(s): AST, ALT, BILIDIR, BILITOT, ALKPHOS in the last 72 hours. No results for input(s): INR in the last 72 hours. No results for input(s): Ki Bigness in the last 72 hours. Assessment/Plan:    Active Hospital Problems    Diagnosis Date Noted    UTI (urinary tract infection) [N39.0] 01/05/2023     Priority: Medium     L flank and abdominal pain  Metastatic cervical cancer  L hydronephrosis s/p stent placement 1/3/2023  - pain management  - urology consulted and following  - planned for possible stent replacement 1/9    UTI with suspected pyelonephritis  - on IV rocephin  - UCx NGTD  - IVF hydration  - c/w abx course to completion    Consitipation  - aggressive bowel regimen    PMH of morbid obesity, anxiety    DVT Prophylaxis: lovenox  Diet: ADULT DIET;  Regular  Diet NPO  Code Status: DNR-CC      Krystina Giles MD

## 2023-01-09 ENCOUNTER — APPOINTMENT (OUTPATIENT)
Dept: GENERAL RADIOLOGY | Age: 33
DRG: 463 | End: 2023-01-09
Payer: COMMERCIAL

## 2023-01-09 ENCOUNTER — ANESTHESIA (OUTPATIENT)
Dept: OPERATING ROOM | Age: 33
DRG: 463 | End: 2023-01-09
Payer: COMMERCIAL

## 2023-01-09 ENCOUNTER — ANESTHESIA EVENT (OUTPATIENT)
Dept: OPERATING ROOM | Age: 33
DRG: 463 | End: 2023-01-09
Payer: COMMERCIAL

## 2023-01-09 LAB
ANION GAP SERPL CALCULATED.3IONS-SCNC: 10 MMOL/L (ref 3–16)
BLOOD CULTURE, ROUTINE: NORMAL
BUN BLDV-MCNC: 16 MG/DL (ref 7–20)
CALCIUM SERPL-MCNC: 8.9 MG/DL (ref 8.3–10.6)
CHLORIDE BLD-SCNC: 101 MMOL/L (ref 99–110)
CO2: 26 MMOL/L (ref 21–32)
CREAT SERPL-MCNC: 0.9 MG/DL (ref 0.6–1.1)
CULTURE, BLOOD 2: NORMAL
GFR SERPL CREATININE-BSD FRML MDRD: >60 ML/MIN/{1.73_M2}
GLUCOSE BLD-MCNC: 97 MG/DL (ref 70–99)
HCT VFR BLD CALC: 33.7 % (ref 36–48)
HEMOGLOBIN: 11.3 G/DL (ref 12–16)
MCH RBC QN AUTO: 30.5 PG (ref 26–34)
MCHC RBC AUTO-ENTMCNC: 33.6 G/DL (ref 31–36)
MCV RBC AUTO: 90.6 FL (ref 80–100)
PDW BLD-RTO: 11.9 % (ref 12.4–15.4)
PLATELET # BLD: 326 K/UL (ref 135–450)
PMV BLD AUTO: 8.4 FL (ref 5–10.5)
POTASSIUM SERPL-SCNC: 4.3 MMOL/L (ref 3.5–5.1)
RBC # BLD: 3.72 M/UL (ref 4–5.2)
SODIUM BLD-SCNC: 137 MMOL/L (ref 136–145)
WBC # BLD: 7.4 K/UL (ref 4–11)

## 2023-01-09 PROCEDURE — 6370000000 HC RX 637 (ALT 250 FOR IP): Performed by: UROLOGY

## 2023-01-09 PROCEDURE — 3700000001 HC ADD 15 MINUTES (ANESTHESIA): Performed by: UROLOGY

## 2023-01-09 PROCEDURE — 6360000004 HC RX CONTRAST MEDICATION: Performed by: UROLOGY

## 2023-01-09 PROCEDURE — 2580000003 HC RX 258: Performed by: UROLOGY

## 2023-01-09 PROCEDURE — 6370000000 HC RX 637 (ALT 250 FOR IP): Performed by: ANESTHESIOLOGY

## 2023-01-09 PROCEDURE — 7100000001 HC PACU RECOVERY - ADDTL 15 MIN: Performed by: UROLOGY

## 2023-01-09 PROCEDURE — 6360000002 HC RX W HCPCS

## 2023-01-09 PROCEDURE — 94640 AIRWAY INHALATION TREATMENT: CPT

## 2023-01-09 PROCEDURE — 6360000002 HC RX W HCPCS: Performed by: INTERNAL MEDICINE

## 2023-01-09 PROCEDURE — 6370000000 HC RX 637 (ALT 250 FOR IP): Performed by: INTERNAL MEDICINE

## 2023-01-09 PROCEDURE — 80048 BASIC METABOLIC PNL TOTAL CA: CPT

## 2023-01-09 PROCEDURE — 74420 UROGRAPHY RTRGR +-KUB: CPT

## 2023-01-09 PROCEDURE — 6370000000 HC RX 637 (ALT 250 FOR IP): Performed by: CLINICAL NURSE SPECIALIST

## 2023-01-09 PROCEDURE — 6360000002 HC RX W HCPCS: Performed by: UROLOGY

## 2023-01-09 PROCEDURE — 2709999900 HC NON-CHARGEABLE SUPPLY: Performed by: UROLOGY

## 2023-01-09 PROCEDURE — 3600000004 HC SURGERY LEVEL 4 BASE: Performed by: UROLOGY

## 2023-01-09 PROCEDURE — 7100000000 HC PACU RECOVERY - FIRST 15 MIN: Performed by: UROLOGY

## 2023-01-09 PROCEDURE — C1758 CATHETER, URETERAL: HCPCS | Performed by: UROLOGY

## 2023-01-09 PROCEDURE — C2617 STENT, NON-COR, TEM W/O DEL: HCPCS | Performed by: UROLOGY

## 2023-01-09 PROCEDURE — 2500000003 HC RX 250 WO HCPCS: Performed by: REGISTERED NURSE

## 2023-01-09 PROCEDURE — 85027 COMPLETE CBC AUTOMATED: CPT

## 2023-01-09 PROCEDURE — 3700000000 HC ANESTHESIA ATTENDED CARE: Performed by: UROLOGY

## 2023-01-09 PROCEDURE — 36415 COLL VENOUS BLD VENIPUNCTURE: CPT

## 2023-01-09 PROCEDURE — 3600000014 HC SURGERY LEVEL 4 ADDTL 15MIN: Performed by: UROLOGY

## 2023-01-09 PROCEDURE — C1769 GUIDE WIRE: HCPCS | Performed by: UROLOGY

## 2023-01-09 PROCEDURE — 2580000003 HC RX 258: Performed by: REGISTERED NURSE

## 2023-01-09 PROCEDURE — 1200000000 HC SEMI PRIVATE

## 2023-01-09 PROCEDURE — 6370000000 HC RX 637 (ALT 250 FOR IP): Performed by: NURSE PRACTITIONER

## 2023-01-09 PROCEDURE — 6360000002 HC RX W HCPCS: Performed by: REGISTERED NURSE

## 2023-01-09 DEVICE — URETERAL STENT
Type: IMPLANTABLE DEVICE | Site: URETHRA | Status: FUNCTIONAL
Brand: CONTOUR™

## 2023-01-09 RX ORDER — OXYCODONE HCL 40 MG/1
40 TABLET, FILM COATED, EXTENDED RELEASE ORAL EVERY 12 HOURS SCHEDULED
Status: DISCONTINUED | OUTPATIENT
Start: 2023-01-09 | End: 2023-01-10

## 2023-01-09 RX ORDER — ONDANSETRON 2 MG/ML
4 INJECTION INTRAMUSCULAR; INTRAVENOUS
Status: DISCONTINUED | OUTPATIENT
Start: 2023-01-09 | End: 2023-01-09 | Stop reason: HOSPADM

## 2023-01-09 RX ORDER — HYDROMORPHONE HCL 110MG/55ML
0.5 PATIENT CONTROLLED ANALGESIA SYRINGE INTRAVENOUS EVERY 5 MIN PRN
Status: DISCONTINUED | OUTPATIENT
Start: 2023-01-09 | End: 2023-01-09 | Stop reason: HOSPADM

## 2023-01-09 RX ORDER — SODIUM CHLORIDE 9 MG/ML
INJECTION, SOLUTION INTRAVENOUS PRN
Status: DISCONTINUED | OUTPATIENT
Start: 2023-01-09 | End: 2023-01-09 | Stop reason: HOSPADM

## 2023-01-09 RX ORDER — FENTANYL CITRATE 50 UG/ML
INJECTION, SOLUTION INTRAMUSCULAR; INTRAVENOUS PRN
Status: DISCONTINUED | OUTPATIENT
Start: 2023-01-09 | End: 2023-01-09 | Stop reason: SDUPTHER

## 2023-01-09 RX ORDER — EPINEPHRINE 1 MG/ML
INJECTION, SOLUTION, CONCENTRATE INTRAVENOUS PRN
Status: DISCONTINUED | OUTPATIENT
Start: 2023-01-09 | End: 2023-01-09 | Stop reason: SDUPTHER

## 2023-01-09 RX ORDER — PROPOFOL 10 MG/ML
INJECTION, EMULSION INTRAVENOUS PRN
Status: DISCONTINUED | OUTPATIENT
Start: 2023-01-09 | End: 2023-01-09 | Stop reason: SDUPTHER

## 2023-01-09 RX ORDER — DEXAMETHASONE SODIUM PHOSPHATE 4 MG/ML
INJECTION, SOLUTION INTRA-ARTICULAR; INTRALESIONAL; INTRAMUSCULAR; INTRAVENOUS; SOFT TISSUE PRN
Status: DISCONTINUED | OUTPATIENT
Start: 2023-01-09 | End: 2023-01-09 | Stop reason: SDUPTHER

## 2023-01-09 RX ORDER — DIPHENHYDRAMINE HYDROCHLORIDE 50 MG/ML
12.5 INJECTION INTRAMUSCULAR; INTRAVENOUS
Status: DISCONTINUED | OUTPATIENT
Start: 2023-01-09 | End: 2023-01-09 | Stop reason: HOSPADM

## 2023-01-09 RX ORDER — SODIUM CHLORIDE 0.9 % (FLUSH) 0.9 %
5-40 SYRINGE (ML) INJECTION PRN
Status: DISCONTINUED | OUTPATIENT
Start: 2023-01-09 | End: 2023-01-09 | Stop reason: HOSPADM

## 2023-01-09 RX ORDER — MEPERIDINE HYDROCHLORIDE 25 MG/ML
12.5 INJECTION INTRAMUSCULAR; INTRAVENOUS; SUBCUTANEOUS EVERY 5 MIN PRN
Status: DISCONTINUED | OUTPATIENT
Start: 2023-01-09 | End: 2023-01-09 | Stop reason: HOSPADM

## 2023-01-09 RX ORDER — MAGNESIUM HYDROXIDE 1200 MG/15ML
LIQUID ORAL
Status: COMPLETED | OUTPATIENT
Start: 2023-01-09 | End: 2023-01-09

## 2023-01-09 RX ORDER — MIDAZOLAM HYDROCHLORIDE 1 MG/ML
INJECTION INTRAMUSCULAR; INTRAVENOUS PRN
Status: DISCONTINUED | OUTPATIENT
Start: 2023-01-09 | End: 2023-01-09 | Stop reason: SDUPTHER

## 2023-01-09 RX ORDER — HYDROMORPHONE HCL 110MG/55ML
PATIENT CONTROLLED ANALGESIA SYRINGE INTRAVENOUS
Status: COMPLETED
Start: 2023-01-09 | End: 2023-01-09

## 2023-01-09 RX ORDER — SODIUM CHLORIDE 0.9 % (FLUSH) 0.9 %
5-40 SYRINGE (ML) INJECTION EVERY 12 HOURS SCHEDULED
Status: DISCONTINUED | OUTPATIENT
Start: 2023-01-09 | End: 2023-01-09 | Stop reason: HOSPADM

## 2023-01-09 RX ORDER — IODIXANOL 320 MG/ML
INJECTION, SOLUTION INTRAVASCULAR
Status: COMPLETED | OUTPATIENT
Start: 2023-01-09 | End: 2023-01-09

## 2023-01-09 RX ORDER — MIDAZOLAM HYDROCHLORIDE 2 MG/2ML
2 INJECTION, SOLUTION INTRAMUSCULAR; INTRAVENOUS ONCE
Status: COMPLETED | OUTPATIENT
Start: 2023-01-09 | End: 2023-01-09

## 2023-01-09 RX ORDER — SCOLOPAMINE TRANSDERMAL SYSTEM 1 MG/1
1 PATCH, EXTENDED RELEASE TRANSDERMAL
Status: DISCONTINUED | OUTPATIENT
Start: 2023-01-09 | End: 2023-01-10 | Stop reason: HOSPADM

## 2023-01-09 RX ORDER — MIDAZOLAM HYDROCHLORIDE 1 MG/ML
INJECTION INTRAMUSCULAR; INTRAVENOUS
Status: COMPLETED
Start: 2023-01-09 | End: 2023-01-09

## 2023-01-09 RX ORDER — ONDANSETRON 2 MG/ML
INJECTION INTRAMUSCULAR; INTRAVENOUS PRN
Status: DISCONTINUED | OUTPATIENT
Start: 2023-01-09 | End: 2023-01-09 | Stop reason: SDUPTHER

## 2023-01-09 RX ORDER — HYDROMORPHONE HCL 110MG/55ML
0.25 PATIENT CONTROLLED ANALGESIA SYRINGE INTRAVENOUS EVERY 5 MIN PRN
Status: DISCONTINUED | OUTPATIENT
Start: 2023-01-09 | End: 2023-01-09 | Stop reason: HOSPADM

## 2023-01-09 RX ORDER — LIDOCAINE HYDROCHLORIDE 20 MG/ML
INJECTION, SOLUTION EPIDURAL; INFILTRATION; INTRACAUDAL; PERINEURAL PRN
Status: DISCONTINUED | OUTPATIENT
Start: 2023-01-09 | End: 2023-01-09 | Stop reason: SDUPTHER

## 2023-01-09 RX ORDER — SODIUM CHLORIDE 9 MG/ML
INJECTION, SOLUTION INTRAVENOUS CONTINUOUS PRN
Status: DISCONTINUED | OUTPATIENT
Start: 2023-01-09 | End: 2023-01-09 | Stop reason: SDUPTHER

## 2023-01-09 RX ADMIN — LIDOCAINE HYDROCHLORIDE 80 MG: 20 INJECTION, SOLUTION EPIDURAL; INFILTRATION; INTRACAUDAL; PERINEURAL at 15:36

## 2023-01-09 RX ADMIN — Medication 2 PUFF: at 22:29

## 2023-01-09 RX ADMIN — MORPHINE SULFATE 4 MG: 4 INJECTION, SOLUTION INTRAMUSCULAR; INTRAVENOUS at 22:18

## 2023-01-09 RX ADMIN — MIDAZOLAM 2 MG: 1 INJECTION INTRAMUSCULAR; INTRAVENOUS at 14:56

## 2023-01-09 RX ADMIN — MORPHINE SULFATE 4 MG: 4 INJECTION, SOLUTION INTRAMUSCULAR; INTRAVENOUS at 12:30

## 2023-01-09 RX ADMIN — MIDAZOLAM HYDROCHLORIDE 2 MG: 2 INJECTION, SOLUTION INTRAMUSCULAR; INTRAVENOUS at 14:56

## 2023-01-09 RX ADMIN — MORPHINE SULFATE 4 MG: 4 INJECTION, SOLUTION INTRAMUSCULAR; INTRAVENOUS at 01:21

## 2023-01-09 RX ADMIN — HYDROMORPHONE HYDROCHLORIDE 0.5 MG: 2 INJECTION, SOLUTION INTRAMUSCULAR; INTRAVENOUS; SUBCUTANEOUS at 16:25

## 2023-01-09 RX ADMIN — OXYBUTYNIN CHLORIDE 5 MG: 5 TABLET ORAL at 08:58

## 2023-01-09 RX ADMIN — POLYETHYLENE GLYCOL 3350 17 G: 17 POWDER, FOR SOLUTION ORAL at 20:25

## 2023-01-09 RX ADMIN — FENTANYL CITRATE 50 MCG: 50 INJECTION, SOLUTION INTRAMUSCULAR; INTRAVENOUS at 15:36

## 2023-01-09 RX ADMIN — CETIRIZINE HYDROCHLORIDE 5 MG: 10 TABLET, FILM COATED ORAL at 08:59

## 2023-01-09 RX ADMIN — GABAPENTIN 300 MG: 300 CAPSULE ORAL at 20:25

## 2023-01-09 RX ADMIN — POLYETHYLENE GLYCOL 3350 17 G: 17 POWDER, FOR SOLUTION ORAL at 08:59

## 2023-01-09 RX ADMIN — OXYBUTYNIN CHLORIDE 5 MG: 5 TABLET ORAL at 18:17

## 2023-01-09 RX ADMIN — MORPHINE SULFATE 4 MG: 4 INJECTION, SOLUTION INTRAMUSCULAR; INTRAVENOUS at 10:08

## 2023-01-09 RX ADMIN — LORAZEPAM 1 MG: 2 INJECTION INTRAMUSCULAR; INTRAVENOUS at 12:55

## 2023-01-09 RX ADMIN — LORAZEPAM 1 MG: 2 INJECTION INTRAMUSCULAR; INTRAVENOUS at 02:29

## 2023-01-09 RX ADMIN — EPINEPHRINE 5 MCG: 1 INJECTION, SOLUTION INTRAMUSCULAR; SUBCUTANEOUS at 15:52

## 2023-01-09 RX ADMIN — OXYCODONE HYDROCHLORIDE 40 MG: 40 TABLET, FILM COATED, EXTENDED RELEASE ORAL at 20:25

## 2023-01-09 RX ADMIN — PHENYLEPHRINE HYDROCHLORIDE 50 MCG: 10 INJECTION INTRAVENOUS at 16:04

## 2023-01-09 RX ADMIN — PHENAZOPYRIDINE 200 MG: 100 TABLET ORAL at 18:17

## 2023-01-09 RX ADMIN — Medication 0.5 MG: at 16:25

## 2023-01-09 RX ADMIN — SODIUM CHLORIDE: 9 INJECTION, SOLUTION INTRAVENOUS at 15:26

## 2023-01-09 RX ADMIN — PANTOPRAZOLE SODIUM 40 MG: 40 TABLET, DELAYED RELEASE ORAL at 18:17

## 2023-01-09 RX ADMIN — PHENYLEPHRINE HYDROCHLORIDE 100 MCG: 10 INJECTION INTRAVENOUS at 15:57

## 2023-01-09 RX ADMIN — TRAZODONE HYDROCHLORIDE 50 MG: 50 TABLET ORAL at 21:35

## 2023-01-09 RX ADMIN — MIDAZOLAM 2 MG: 1 INJECTION INTRAMUSCULAR; INTRAVENOUS at 15:32

## 2023-01-09 RX ADMIN — OXYCODONE HYDROCHLORIDE 20 MG: 20 TABLET, FILM COATED, EXTENDED RELEASE ORAL at 08:58

## 2023-01-09 RX ADMIN — FLUTICASONE PROPIONATE 2 SPRAY: 50 SPRAY, METERED NASAL at 09:03

## 2023-01-09 RX ADMIN — SENNOSIDES 17.2 MG: 8.6 TABLET, FILM COATED ORAL at 20:24

## 2023-01-09 RX ADMIN — MORPHINE SULFATE 4 MG: 4 INJECTION, SOLUTION INTRAMUSCULAR; INTRAVENOUS at 18:17

## 2023-01-09 RX ADMIN — ONDANSETRON 4 MG: 2 INJECTION INTRAMUSCULAR; INTRAVENOUS at 15:39

## 2023-01-09 RX ADMIN — OXYBUTYNIN CHLORIDE 5 MG: 5 TABLET ORAL at 20:25

## 2023-01-09 RX ADMIN — PHENAZOPYRIDINE 200 MG: 100 TABLET ORAL at 08:58

## 2023-01-09 RX ADMIN — DOCUSATE SODIUM 100 MG: 100 CAPSULE, LIQUID FILLED ORAL at 08:58

## 2023-01-09 RX ADMIN — DEXAMETHASONE SODIUM PHOSPHATE 10 MG: 4 INJECTION, SOLUTION INTRAMUSCULAR; INTRAVENOUS at 15:39

## 2023-01-09 RX ADMIN — EPINEPHRINE 5 MCG: 1 INJECTION, SOLUTION INTRAMUSCULAR; SUBCUTANEOUS at 15:57

## 2023-01-09 RX ADMIN — TAMSULOSIN HYDROCHLORIDE 0.4 MG: 0.4 CAPSULE ORAL at 08:58

## 2023-01-09 RX ADMIN — PROPOFOL 150 MG: 10 INJECTION, EMULSION INTRAVENOUS at 15:36

## 2023-01-09 RX ADMIN — MORPHINE SULFATE 4 MG: 4 INJECTION, SOLUTION INTRAMUSCULAR; INTRAVENOUS at 05:53

## 2023-01-09 RX ADMIN — EPINEPHRINE 5 MCG: 1 INJECTION, SOLUTION INTRAMUSCULAR; SUBCUTANEOUS at 16:04

## 2023-01-09 RX ADMIN — PHENAZOPYRIDINE 200 MG: 100 TABLET ORAL at 12:30

## 2023-01-09 RX ADMIN — ONDANSETRON 4 MG: 2 INJECTION INTRAMUSCULAR; INTRAVENOUS at 12:54

## 2023-01-09 RX ADMIN — PHENYLEPHRINE HYDROCHLORIDE 100 MCG: 10 INJECTION INTRAVENOUS at 15:50

## 2023-01-09 RX ADMIN — CEFTRIAXONE SODIUM 1000 MG: 1 INJECTION, POWDER, FOR SOLUTION INTRAMUSCULAR; INTRAVENOUS at 20:34

## 2023-01-09 RX ADMIN — SENNOSIDES 17.2 MG: 8.6 TABLET, FILM COATED ORAL at 08:58

## 2023-01-09 ASSESSMENT — PAIN DESCRIPTION - LOCATION
LOCATION: ABDOMEN
LOCATION: ABDOMEN;BACK
LOCATION: BACK
LOCATION: ABDOMEN;BACK;LEG
LOCATION: BACK;ABDOMEN
LOCATION: BACK
LOCATION: ABDOMEN
LOCATION: PELVIS
LOCATION: ABDOMEN;BACK
LOCATION: ABDOMEN;BACK
LOCATION: BACK

## 2023-01-09 ASSESSMENT — ENCOUNTER SYMPTOMS
BACK PAIN: 1
ABDOMINAL PAIN: 1
SHORTNESS OF BREATH: 1

## 2023-01-09 ASSESSMENT — PAIN SCALES - GENERAL
PAINLEVEL_OUTOF10: 7
PAINLEVEL_OUTOF10: 8
PAINLEVEL_OUTOF10: 0
PAINLEVEL_OUTOF10: 8

## 2023-01-09 ASSESSMENT — PAIN DESCRIPTION - DESCRIPTORS
DESCRIPTORS: DISCOMFORT
DESCRIPTORS: DISCOMFORT
DESCRIPTORS: BURNING
DESCRIPTORS: DISCOMFORT
DESCRIPTORS: DISCOMFORT
DESCRIPTORS: SHARP

## 2023-01-09 ASSESSMENT — LIFESTYLE VARIABLES: SMOKING_STATUS: 0

## 2023-01-09 ASSESSMENT — PAIN DESCRIPTION - ORIENTATION
ORIENTATION: LEFT

## 2023-01-09 ASSESSMENT — PAIN - FUNCTIONAL ASSESSMENT: PAIN_FUNCTIONAL_ASSESSMENT: 0-10

## 2023-01-09 NOTE — PROGRESS NOTES
01/09/23 1817   RT Protocol   History Pulmonary Disease 1   Respiratory pattern 0   Breath sounds 2   Cough 0   Bronchodilator Assessment Score 3

## 2023-01-09 NOTE — PROGRESS NOTES
[facility] with patient before her procedure to touch base on discharge plan. Patient stated tentative discharge plan is to discharge home tomorrow with [address]. Plan is for [address] RN to follow up tomorrow to order DME if needed, review medications, schedule home RN, and finalize discharge. Thank you for allowing us to be a part of Connie's care.     Odessa Brandt RN  [facility] #: [phone]  Cell: [phone]

## 2023-01-09 NOTE — RT PROTOCOL NOTE
RT Inhaler-Nebulizer Bronchodilator Protocol Note    There is a bronchodilator order in the chart from a provider indicating to follow the RT Bronchodilator Protocol and there is an Initiate RT Inhaler-Nebulizer Bronchodilator Protocol order as well (see protocol at bottom of note). CXR Findings:  No results found. The findings from the last RT Protocol Assessment were as follows:   History Pulmonary Disease: Smoker 15 pack years or more  Respiratory Pattern: Regular pattern and RR 12-20 bpm  Breath Sounds: Slightly diminished and/or crackles  Cough: Strong, spontaneous, non-productive  Indication for Bronchodilator Therapy:    Bronchodilator Assessment Score: 3    Aerosolized bronchodilator medication orders have been revised according to the RT Inhaler-Nebulizer Bronchodilator Protocol below. Respiratory Therapist to perform RT Therapy Protocol Assessment initially then follow the protocol. Repeat RT Therapy Protocol Assessment PRN for score 0-3 or on second treatment, BID, and PRN for scores above 3. No Indications - adjust the frequency to every 6 hours PRN wheezing or bronchospasm, if no treatments needed after 48 hours then discontinue using Per Protocol order mode. If indication present, adjust the RT bronchodilator orders based on the Bronchodilator Assessment Score as indicated below. Use Inhaler orders unless patient has one or more of the following: on home nebulizer, not able to hold breath for 10 seconds, is not alert and oriented, cannot activate and use MDI correctly, or respiratory rate 25 breaths per minute or more, then use the equivalent nebulizer order(s) with same Frequency and PRN reasons based on the score. If a patient is on this medication at home then do not decrease Frequency below that used at home.     0-3 - enter or revise RT bronchodilator order(s) to equivalent RT Bronchodilator order with Frequency of every 4 hours PRN for wheezing or increased work of breathing using Per Protocol order mode. 4-6 - enter or revise RT Bronchodilator order(s) to two equivalent RT bronchodilator orders with one order with BID Frequency and one order with Frequency of every 4 hours PRN wheezing or increased work of breathing using Per Protocol order mode. 7-10 - enter or revise RT Bronchodilator order(s) to two equivalent RT bronchodilator orders with one order with TID Frequency and one order with Frequency of every 4 hours PRN wheezing or increased work of breathing using Per Protocol order mode. 11-13 - enter or revise RT Bronchodilator order(s) to one equivalent RT bronchodilator order with QID Frequency and an Albuterol order with Frequency of every 4 hours PRN wheezing or increased work of breathing using Per Protocol order mode. Greater than 13 - enter or revise RT Bronchodilator order(s) to one equivalent RT bronchodilator order with every 4 hours Frequency and an Albuterol order with Frequency of every 2 hours PRN wheezing or increased work of breathing using Per Protocol order mode. RT to enter RT Home Evaluation for COPD & MDI Assessment order using Per Protocol order mode.     Electronically signed by Tram Cook RCP on 1/9/2023 at 6:17 PM

## 2023-01-09 NOTE — PLAN OF CARE
Problem: Pain  Goal: Verbalizes/displays adequate comfort level or baseline comfort level  1/8/2023 2104 by Miguel Angel Chicas RN  Outcome: Progressing  Flowsheets  Taken 1/8/2023 1146 by Arti Slaughter RN  Verbalizes/displays adequate comfort level or baseline comfort level:   Encourage patient to monitor pain and request assistance   Assess pain using appropriate pain scale  Taken 1/8/2023 0759 by Arti Slaughter RN  Verbalizes/displays adequate comfort level or baseline comfort level:   Encourage patient to monitor pain and request assistance   Assess pain using appropriate pain scale  1/8/2023 0714 by Arti Slaughter RN  Outcome: Progressing     Problem: Safety - Adult  Goal: Free from fall injury  1/8/2023 2104 by Miguel Angel Chicas RN  Outcome: Progressing  1/8/2023 0714 by Arti Slaughter RN  Outcome: Progressing     Problem: ABCDS Injury Assessment  Goal: Absence of physical injury  1/8/2023 2104 by Miguel Angel Chicas RN  Outcome: Progressing  1/8/2023 0714 by Arti Slaughter RN  Outcome: Progressing

## 2023-01-09 NOTE — ANESTHESIA POSTPROCEDURE EVALUATION
Department of Anesthesiology  Postprocedure Note    Patient: Harsh Foss  MRN: [id number]  YOB: 1990  Date of evaluation: 1/9/2023      Procedure Summary     Date: 01/09/23 Room / Location: [address] [place] [address]    Anesthesia Start: 1343 Anesthesia Stop: 9331    Procedure: CYSTOSCOPY, LEFT RETROGRADE PYELOGRAM, EXCHANGE OF LEFT URETERAL STENT (Left: Urethra) Diagnosis:       Hydronephrosis of left kidney      (Hydronephrosis of Left Kidney)    Surgeons: Manfred Torres MD Responsible Provider: Megan Hernandez MD    Anesthesia Type: general ASA Status: 2          Anesthesia Type: No value filed.     Keerthi Phase I: Keerthi Score: 8    Keerthi Phase II:        Anesthesia Post Evaluation    Patient location during evaluation: PACU  Patient participation: complete - patient participated  Level of consciousness: awake and alert  Pain score: 2  Airway patency: patent  Nausea & Vomiting: no vomiting  Complications: no  Cardiovascular status: blood pressure returned to baseline  Respiratory status: acceptable  Hydration status: euvolemic  Multimodal analgesia pain management approach

## 2023-01-09 NOTE — PROGRESS NOTES
Patient is alert, medicated for stent discomfort. VSS. Tolerating ice chips. Phase 1 recovery completed, will transfer back to .

## 2023-01-09 NOTE — PROGRESS NOTES
PALLIATIVE MEDICINE PROGRESS NOTE     Patient name:Connie De La Torre    AHY:[id number] :1990  Room/Bed:[facility]-5581/5581-01    LOS: 4 days        ASSESSMENT/RECOMMENDATIONS     29 y.o. female with back pain and debility d/t metastatic cervical cancer        Symptom Management:  Back pain- related to cancer and stent placement, pt c/o 8/10 pain Her pain is constant and remains uncontrolled will increase Oxycontin 40mg BID and oxycodone 5-10mg Q 3 hours. Debility- pt has been needing assistance with ADLs due to pain   Goals of Care- Pt following to [address] her pain remains poorly controlled today she is against [place] [address] admission she wants to get pain controlled then go home. She continues to state that she is OK with Hospice care with her goal being symptom management and no aggressive Tx. Patient/Family Goals of Care :      talked to pt and father at bedside regarding what her goal is pt is clear that she wants symptom management only no additional Tx, tests or procedures we had a melissa discussion that without Tx that her cancer will advance and she will die she is aware of this and states that when its her time she is ready updated to [facility] at her request and made referral to Eve Manuel       Pt following to [address] her pain remains poorly controlled today she is against [place] [address] admission she wants to get pain controlled then go home. She continues to state that she is OK with Hospice care with her goal being symptom management and no aggressive Tx. Advance Directives:  Code status:  DNR-CC  Decision Maker: Liliam Arellano- parent    Case Discussed with:  patient, spouse, floor RN    Thank you for allowing us to participate in the care of this patient. SUBJECTIVE     Chief Complaint: Flank pain    Last 24 hours:   Pt rates pain 8/10 this am using all PRN options and remains poorly controlled    ROS:    Review of Systems   Constitutional:  Positive for activity change, appetite change and fatigue.    Respiratory:  Positive for shortness of breath. Gastrointestinal:  Positive for abdominal pain. Genitourinary:  Positive for flank pain. Musculoskeletal:  Positive for back pain. Skin:  Positive for pallor. Neurological:  Positive for weakness. All other systems reviewed and are negative. OBJECTIVE   /73   Pulse 56   Temp 98.7 °F (37.1 °C) (Oral)   Resp 18   Ht 5' 7\" (1.702 m)   Wt 189 lb 8 oz (86 kg)   SpO2 96%   BMI 29.68 kg/m²   I/O last 3 completed shifts: In: 240 [P.O.:240]  Out: -   No intake/output data recorded. Physical Exam  Constitutional:       Appearance: She is ill-appearing. HENT:      Head: Normocephalic and atraumatic. Nose: No congestion. Mouth/Throat:      Mouth: Mucous membranes are dry. Eyes:      Pupils: Pupils are equal, round, and reactive to light. Cardiovascular:      Rate and Rhythm: Normal rate. Heart sounds: No murmur heard. No friction rub. No gallop. Pulmonary:      Effort: No respiratory distress. Abdominal:      Palpations: Abdomen is soft. Musculoskeletal:      Cervical back: Normal range of motion. Right lower leg: Edema present. Left lower leg: Edema present. Skin:     General: Skin is warm and dry. Coloration: Skin is pale. Neurological:      General: No focal deficit present.             Total time: 35 minutes  >50% of time spent counseling patient at bedside or POA/family member if applicable , reviewing information and discussing care, coordinating with care team       Signed By: Electronically signed by TAYE Horton CNP on 1/9/2023 at 10:14 AM   Palliative Medicine   [phone]    January 9, 2023

## 2023-01-09 NOTE — OP NOTE
Urology Operative Report  [facility]    Provider: Corin Mcdaniel MD MD Patient ID:  Admission Date: 2023 Name: Concetta Garcia  OR Date: 2023  MRN: [id number]   Patient Location: OR/NONE : 1990  Attending: Mario Polo MD Date of Service: 2023  PCP: Chasidy De La Rosa MD     Date of Operation: 2023    Preoperative Diagnosis: LEFT sided hydronephrosis    Postoperative Diagnosis: same    Procedure:    1. Cystoscopy  2. Left side ureteral stent replacement  3. Fluoroscopic imaging < 1 hr physician time  4. LEFT side retrograde ureteropyelogram    Surgeon:   Corin Mcdaniel MD,    Anesthesia: General LMA anesthesia    Indications: Concetta Garcia is a 29 y.o. female who presents for the above named surgery. Informed consent was obtained and the risks, benefits, and details of the procedure were explained to the patient who elected to proceed. Details of Procedure: The patient was brought to the operating room and placed in the supine position on the operating room table. SCDs were placed on the lower extremities. Following induction of anesthesia the patient was positioned in a lithotomy position, all pressure points were padded, and the genitals were prepped and draped in the usual sterile fashion. A routine timeout was performed, confirming the patient, procedure, site, risk of fire, patient allergies and confirming that preoperative antibiotics had been administered prior to beginning. A 21 fr rigid cystoscope was advance via a normal appearing urethra into the bladder. The bladder was inspected. An 8fr ureteral stent with mild surrounding edema and what appeared to be invasive tumor into the bladder at the left posterior wall as well as tumor that appeared to be coming in through the left ureteral orifice. Attention was turned to the left ureteral orifice. A 0.035 sensor wire was advanced alongside the indwelling stent to not lose access.  We then externalized the stent and placed a wire through the lumen and up to the renal pelvis under control of fluoroscopy. The stent was removed, and a 5 Fr open ended catheter was placed, and a retrograde was shot to confirm we were in the appropriate position. The wire was replaced. Over the working wire a 6x24 stent was advanced under control of fluoroscopy with good curl in the renal pelvis and the urinary bladder. The additional safety wire was removed. The bladder was emptied and the scope removed    At the end of the procedure all counts were correct. The patient tolerated the procedure well and was transported to the PACU in stable condition. Findings: downsized diameter and length of indwelling stent. Moderate resistance with placement. What appeared to be invasive cervical cancer at the left side of the bladder    Estimated Blood Loss: minimal                  Drains: 6fr x 24cm left ureteral stent          Specimens: none    Complications: none apparent           Disposition:  PACU - hemodynamically stable. Plan: Continue stent related medication, plan for hospice.  If pain refractory consideration of stent removal +/- PNT            Orestes Franklin MD  1/9/2023

## 2023-01-09 NOTE — PROGRESS NOTES
Urology Progress Note  [facility]    Provider: James Flowers MD MD Patient ID:  Admission Date: 2023 Name: Miya Maya  OR Date: 2023 MRN: [id number]   Patient Location: [id number] : 1990  Attending: Matthew Perez MD Date of Service: 2023  PCP: Rachelle Fuentes MD     Diagnosis:   Flank Pain     ASSESSMENT/PLAN      30 yo female with hx of of advanced cervical cancer, metastasizing to the left ovary, with left hydronephrosis. Dr. Bruna Garrison had attempted cysto retrograde stent placement but unable to get stent up. Recently had a LEFT antegrade stent insertion per IR 2022. She is admitted with severe pelvic pain and left flank pain associated with nausea and vomiting. UA is possibly infected, although possibly contaminated as well. Air in the kidney may be 2/2 to recent instrumentation vs pyelonephritis. She does not want further care, she just wants palliative/hospice care.   ==  Ucx NGTD. Still with significant pain from stent requiring IV narcotic     Recommendations:  - OR today for stent exchange downsizing  - if no improvement options of observation,  Removal of stent completely (known risks of worsening pain, renal failure, needs for repeat IR procedure and inability to replace the tube) and stent removal with PNT placement alone (she is refusing this)  - no plans for additional treatment, understands all risks appreciate palliative/hospice  - continue stent pain medications     All the patients questions were answered. She understands the plan as listed above. Subjective:   Miya Maya is a 29 y.o. female. She was seen and examined this morning.  Today we discussed plan for OR today, some confusion as though needed to be done in IR      Objective:   Vitals:  Vitals:    23 1245   BP: 122/79   Pulse: 62   Resp: 16   Temp: 98.3 °F (36.8 °C)   SpO2: 94%     No intake or output data in the 24 hours ending 23 1412  Physical Exam:  Gen: Alert and oriented x3, no acute distress    Labs:  Lab Results   Component Value Date    WBC 7.4 01/09/2023    HGB 11.3 (L) 01/09/2023    HCT 33.7 (L) 01/09/2023    MCV 90.6 01/09/2023     01/09/2023     Lab Results   Component Value Date    CREATININE 0.9 01/09/2023    BUN 16 01/09/2023     01/09/2023    K 4.3 01/09/2023     01/09/2023    CO2 26 01/09/2023       Aretha Thomas MD MD  1/9/2023

## 2023-01-09 NOTE — PROGRESS NOTES
Assessment complete. VSS. C/o 8/10 pain in L back and abdomen. NPO for procedure. Had a small BM today. No N/V. Patient resting in bed. Respirations even and easy. Call light in reach. Fall precautions in place.

## 2023-01-09 NOTE — ANESTHESIA PRE PROCEDURE
Department of Anesthesiology  Preprocedure Note       Name:  Joan Godfrey   Age:  29 y.o.  :  1990                                          MRN:  [id number]         Date:  2023      Surgeon: Lillian Fischer):  Joey Mart MD    Procedure: Procedure(s):  CYSTOSCOPY, LEFT RETROGRADE PYELOGRAM, EXCHANGE OF LEFT URETERAL STENT    Medications prior to admission:   Prior to Admission medications    Medication Sig Start Date End Date Taking? Authorizing Provider   tamsulosin (FLOMAX) 0.4 MG capsule Take 0.4 mg by mouth daily    Historical Provider, MD   oxyCODONE-acetaminophen (PERCOCET)  MG per tablet Take 1 tablet by mouth every 4 hours as needed for Pain. Patient not taking: Reported on 2023    Historical Provider, MD   gabapentin (NEURONTIN) 100 MG capsule Take 100 mg by mouth 3 times daily.     Historical Provider, MD   acetaminophen (TYLENOL) 500 MG tablet Take 1 tablet by mouth every 6 hours as needed for Pain 3/18/22   Vicenta Black MD   senna (SENOKOT) 8.6 MG tablet Take 2 tablets by mouth nightly as needed for Constipation 3/18/22 3/18/23  Vicenta Black MD   Calcium Citrate-Vitamin D (CITRACAL + D PO) Take by mouth    Historical Provider, MD   Multiple Vitamin (MVI, BARIATRIC ADVANTAGE VITABAND, CHEW TAB) Take 1 tablet by mouth daily    Historical Provider, MD   budesonide-formoterol (SYMBICORT) 160-4.5 MCG/ACT AERO INHALE 2 PUFFS BY MOUTH INTO THE LUNGS TWO TIMES A DAY 20   Historical Provider, MD   albuterol sulfate  (90 Base) MCG/ACT inhaler INHALE 2 PUFFS INTO THE LUNGS EVERY 4 TO 6 HOURS AS NEEDED 21   Historical Provider, MD   loratadine (CLARITIN) 10 MG tablet Take 10 mg by mouth daily    Historical Provider, MD   traZODone (DESYREL) 50 MG tablet Take 50 mg by mouth nightly    Historical Provider, MD   albuterol (PROVENTIL) (2.5 MG/3ML) 0.083% nebulizer solution Take 2.5 mg by nebulization every 6 hours as needed for Wheezing    Historical Provider, MD   omeprazole (PRILOSEC) 10 MG capsule Take 10 mg by mouth in the morning and at bedtime     Historical Provider, MD   mometasone (NASONEX) 50 MCG/ACT nasal spray 2 sprays by Nasal route daily    Historical Provider, MD       Current medications:    No current facility-administered medications for this visit. No current outpatient medications on file.      Facility-Administered Medications Ordered in Other Visits   Medication Dose Route Frequency Provider Last Rate Last Admin    oxyCODONE (OXYCONTIN) extended release tablet 40 mg  40 mg Oral 2 times per day TAYE Joseph CNP        docusate sodium (COLACE) capsule 100 mg  100 mg Oral Daily Latricia Jane MD   100 mg at 01/09/23 0858    bisacodyl (DULCOLAX) suppository 10 mg  10 mg Rectal Daily PRN TAYE Shanks CNP   10 mg at 01/07/23 2130    oxybutynin (DITROPAN) tablet 5 mg  5 mg Oral TID TAYE Arevalo - CNP   5 mg at 01/09/23 0858    oxyCODONE (ROXICODONE) immediate release tablet 10 mg  10 mg Oral Q3H PRN Ajit Liu APRN - CNP   10 mg at 01/08/23 1552    oxyCODONE (ROXICODONE) immediate release tablet 5 mg  5 mg Oral Q3H PRN TAYE Joseph - CNP        naloxegol (MOVANTIK) tablet 25 mg  25 mg Oral QAM AC TAYE Joseph - CNP   25 mg at 01/08/23 0808    sennosides-docusate sodium (SENOKOT-S) 8.6-50 MG tablet 2 tablet  2 tablet Oral BID PRN TAYE Joseph - CNP   2 tablet at 01/06/23 1527    polyethylene glycol (GLYCOLAX) packet 17 g  17 g Oral TID TAYE Joseph - CNP   17 g at 01/09/23 0859    cefTRIAXone (ROCEPHIN) 1,000 mg in dextrose 5 % 50 mL IVPB mini-bag  1,000 mg IntraVENous Q24H Shannan Ibarra MD   Stopped at 01/08/23 2132    acetaminophen (TYLENOL) tablet 500 mg  500 mg Oral Q6H PRN Shannan Ibarra MD        albuterol (PROVENTIL) nebulizer solution 2.5 mg  2.5 mg Nebulization Q6H PRN Revondtitsu Ibarra MD        mometasone-formoterol (DULERA) 200-5 MCG/ACT inhaler 2 puff  2 puff Inhalation BID Shwetal Zoanne Hodgkin, MD   2 puff at 01/08/23 2130    gabapentin (NEURONTIN) capsule 300 mg  300 mg Oral Nightly Salome Zamudio MD   300 mg at 01/08/23 2050    cetirizine (ZYRTEC) tablet 5 mg  5 mg Oral Daily Salome Zamudio MD   5 mg at 01/09/23 0859    fluticasone (FLONASE) 50 MCG/ACT nasal spray 2 spray  2 spray Each Nostril Daily Salome Zamudio MD   2 spray at 01/09/23 0903    senna (SENOKOT) tablet 17.2 mg  2 tablet Oral BID Salome Zamudio MD   17.2 mg at 01/09/23 0858    tamsulosin (FLOMAX) capsule 0.4 mg  0.4 mg Oral Daily Salome Zamudio MD   0.4 mg at 01/09/23 0858    traZODone (DESYREL) tablet 50 mg  50 mg Oral Nightly Salome Zamudio MD   50 mg at 01/07/23 2017    ondansetron (ZOFRAN) injection 4 mg  4 mg IntraVENous Q6H PRN Salome Zamudio MD        morphine injection 4 mg  4 mg IntraVENous Q2H PRN Salome Zamudio MD   4 mg at 01/09/23 1008    LORazepam (ATIVAN) injection 1 mg  1 mg IntraVENous Q4H PRN Salome Zamudio MD   1 mg at 01/09/23 0229    enoxaparin (LOVENOX) injection 40 mg  40 mg SubCUTAneous Daily Salome Zamudio MD   40 mg at 01/08/23 0807    pantoprazole (PROTONIX) tablet 40 mg  40 mg Oral BID AC Salome Zamudio MD   40 mg at 01/08/23 1552    phenazopyridine (PYRIDIUM) tablet 200 mg  200 mg Oral TID WC Salome Zamudio MD   200 mg at 01/09/23 0858       Allergies:     Allergies   Allergen Reactions    Other      History of bariatric surgery       Problem List:    Patient Active Problem List   Diagnosis Code    Malignant neoplasm of exocervix ([facility] C53.1    Cervical cancer ([facility] C53.9    Anxiety F41.9    UTI (urinary tract infection) N39.0    Anemia D64.9    Asthma J45.909    Moderate persistent asthma with exacerbation J45.41    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    [facility] F31.60    Bipolar disorder ([facility] F31.9    Cannabis abuse F12.10    Drug-induced constipation K59.03    Generalized anxiety disorder F41.1    History of total hysterectomy Z90.710    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       Past Medical History:        Diagnosis Date    ADHD (attention deficit hyperactivity disorder)     Asthma     Bipolar 1 disorder ([facility]     Bipolar disorder ([facility]     COVID-19 2020    Depression     Irregular periods     PONV (postoperative nausea and vomiting)        Past Surgical History:        Procedure Laterality Date    CERVIX BIOPSY N/A 10/28/2021    CERVICAL BIOPSIES,  LOOP EXCISIONAL ELECTROCAUTERY PROCEDURE performed by Sonia Cunningham MD at [address] [place] [address]  12/7/2022    CT BIOPSY ABDOMEN RETROPERITONEUM 12/7/2022 [facility] CT SCAN    IR URETERAL STENT PLACEMENT THRU EXIST TRACT  1/3/2023    IR URETERAL STENT PLACEMENT THRU EXIST TRACT 1/3/2023 [facility] SPECIAL PROCEDURES    LEEP  11/2021    STOMACH SURGERY  06/2021    sleeve gastrectomy    HERVE AND BSO (CERVIX REMOVED) N/A 3/14/2022    ABDOMINAL RADICAL HYSTERECTOMY, BILATERAL SALPINGECTOMY, SENTINEL LYMPH NODE BIOPSY, PELVIC LYMPH NODE DISSECTION, OVARIAN TRANSPOSITION (88412, [id number], [id number]) performed by Sonia Cunningham MD at [facility] History:    Social History     Tobacco Use    Smoking status: Never    Smokeless tobacco: Never   Substance Use Topics    Alcohol use: Yes     Alcohol/week: 3.0 standard drinks     Types: 3 Glasses of wine per week     Comment: social/ weekly                                Counseling given: Not Answered      Vital Signs (Current): There were no vitals filed for this visit.                                            BP Readings from Last 3 Encounters:   01/09/23 115/73   01/03/23 106/65   12/07/22 117/62       NPO Status:                                                                                 BMI:   Wt Readings from Last 3 Encounters:   01/05/23 189 lb 8 oz (86 kg)   01/03/23 178 lb (80.7 kg)   12/07/22 180 lb (81.6 kg)     There is no height or weight on file to calculate BMI.    CBC:   Lab Results   Component Value Date/Time    WBC 7.4 01/09/2023 06:46 AM    RBC 3.72 01/09/2023 06:46 AM    HGB 11.3 01/09/2023 06:46 AM    HCT 33.7 01/09/2023 06:46 AM    MCV 90.6 01/09/2023 06:46 AM    RDW 11.9 01/09/2023 06:46 AM     01/09/2023 06:46 AM       CMP:   Lab Results   Component Value Date/Time     01/09/2023 06:46 AM    K 4.3 01/09/2023 06:46 AM    K 4.2 03/17/2022 09:09 AM     01/09/2023 06:46 AM    CO2 26 01/09/2023 06:46 AM    BUN 16 01/09/2023 06:46 AM    CREATININE 0.9 01/09/2023 06:46 AM    GFRAA >60 03/17/2022 09:09 AM    AGRATIO 1.4 01/05/2023 11:58 AM    LABGLOM >60 01/09/2023 06:46 AM    GLUCOSE 97 01/09/2023 06:46 AM    PROT 7.3 01/05/2023 11:58 AM    CALCIUM 8.9 01/09/2023 06:46 AM    BILITOT 0.5 01/05/2023 11:58 AM    ALKPHOS 58 01/05/2023 11:58 AM    AST 15 01/05/2023 11:58 AM    ALT 9 01/05/2023 11:58 AM       POC Tests: No results for input(s): POCGLU, POCNA, POCK, POCCL, POCBUN, POCHEMO, POCHCT in the last 72 hours. Coags:   Lab Results   Component Value Date/Time    PROTIME 13.3 01/03/2023 07:50 AM    INR 1.02 01/03/2023 07:50 AM       HCG (If Applicable):   Lab Results   Component Value Date    PREGTESTUR Negative 03/14/2022        ABGs: No results found for: PHART, PO2ART, HVR2PBK, MNY2LJS, BEART, E2RGVMVY     Type & Screen (If Applicable):  No results found for: LABABO, LABRH    Drug/Infectious Status (If Applicable):  Lab Results   Component Value Date/Time    HEPCAB Non-Reactive (Negative) 12/27/2011 03:55 PM       COVID-19 Screening (If Applicable):   Lab Results   Component Value Date/Time    COVID19 Not Detected 03/10/2022 03:57 PM           Anesthesia Evaluation  Patient summary reviewed and Nursing notes reviewed   history of anesthetic complications: PONV.   Airway: Mallampati: III  TM distance: >3 FB   Neck ROM: full  Mouth opening: > = 3 FB   Dental: normal exam         Pulmonary:normal exam  breath sounds clear to auscultation  (+) asthma (mod persistent, reg inh use, no recent exacerbations):     (-) sleep apnea and not a current smoker                           Cardiovascular:Negative CV ROS  Exercise tolerance: good (>4 METS),       (-) hypertension, past MI, CAD, CABG/stent, dysrhythmias,  angina and  CHF      Rhythm: regular  Rate: normal                    Neuro/Psych:   (+) psychiatric history (ADHD, Bipolar): stable with treatment   (-) seizures, TIA and CVA           GI/Hepatic/Renal:   (+) GERD: well controlled,      (-) liver disease and no renal disease       Endo/Other: Negative Endo/Other ROS       (-) diabetes mellitus, hypothyroidism, hyperthyroidism               Abdominal:   (+) obese,           Vascular: Other Findings:             Anesthesia Plan      general     ASA 2       Induction: intravenous. MIPS: Postoperative opioids intended, Prophylactic antiemetics administered and Postoperative trial extubation. Anesthetic plan and risks discussed with patient. Plan discussed with CRNA.                     Ginger Felty, MD   1/9/2023

## 2023-01-09 NOTE — PROGRESS NOTES
Hospitalist Progress Note      PCP: Tez Delgado MD    Date of Admission: 1/5/2023    Chief Complaint: Diffuse pain      Subjective:   Continues to have persistent L flank pain. Abd pain with minimal improvement  Intermittent nausea but no emesis  Pending L ureteral stent exchange today    Medications:  Reviewed    Infusion Medications   Scheduled Medications    oxyCODONE  40 mg Oral 2 times per day    scopolamine  1 patch TransDERmal Q72H    docusate sodium  100 mg Oral Daily    oxybutynin  5 mg Oral TID    naloxegol  25 mg Oral QAM AC    polyethylene glycol  17 g Oral TID    cefTRIAXone (ROCEPHIN) IV  1,000 mg IntraVENous Q24H    mometasone-formoterol  2 puff Inhalation BID    gabapentin  300 mg Oral Nightly    cetirizine  5 mg Oral Daily    fluticasone  2 spray Each Nostril Daily    senna  2 tablet Oral BID    tamsulosin  0.4 mg Oral Daily    traZODone  50 mg Oral Nightly    enoxaparin  40 mg SubCUTAneous Daily    pantoprazole  40 mg Oral BID AC    phenazopyridine  200 mg Oral TID WC     PRN Meds: bisacodyl, oxyCODONE, oxyCODONE, sennosides-docusate sodium, acetaminophen, albuterol, ondansetron, morphine, LORazepam    No intake or output data in the 24 hours ending 01/09/23 1513      Exam:    /65   Pulse 74   Temp 98.3 °F (36.8 °C)   Resp 16   Ht 5' 7\" (1.702 m)   Wt 189 lb 8 oz (86 kg)   LMP 03/04/2022 (Approximate)   SpO2 96%   BMI 29.68 kg/m²     Gen/overall appearance: Not in acute distress. Alert. Head: Normocephalic, atraumatic  Eyes: EOMI, no scleral icterus  CVS: regular rhythm, Normal S1S2  Pulm: Clear to auscultation bilaterally. No crackles/wheezes  Gastrointestinal: Soft, L flank pain, no guarding or rebound  Extremities: No edema.  No erythema or warmth  Neuro: No gross focal deficits noted  Skin: Warm, dry    Labs:   Recent Labs     01/08/23  1412 01/09/23  0646   WBC 8.4 7.4   HGB 11.7* 11.3*   HCT 35.3* 33.7*    326       Recent Labs     01/08/23  1412 01/09/23  0646    137   K 4.3 4.3    101   CO2 28 26   BUN 15 16   CREATININE 0.9 0.9   CALCIUM 9.3 8.9       No results for input(s): AST, ALT, BILIDIR, BILITOT, ALKPHOS in the last 72 hours. No results for input(s): INR in the last 72 hours. No results for input(s): Shruthi Nasuti in the last 72 hours.     Assessment/Plan:    Active Hospital Problems    Diagnosis Date Noted    UTI (urinary tract infection) [N39.0] 01/05/2023     Priority: Medium     L flank and abdominal pain  Metastatic cervical cancer  L hydronephrosis s/p stent placement 1/3/2023  - pain management  - prn anti-emetics  - IVFs  - urology following  - palliative following for pain control  - planned for L urtereal stent replacement 1/9    UTI with suspected pyelonephritis  - on IV rocephin course  - UCx NGTD  - IVF hydration  - c/w abx course to completion    Consitipation  - aggressive bowel regimen    PMH of morbid obesity, anxiety    DVT Prophylaxis: lovenox  Diet: Diet NPO  Code Status: DNR-CC      Gianluca Anderson MD

## 2023-01-10 VITALS
DIASTOLIC BLOOD PRESSURE: 64 MMHG | HEIGHT: 67 IN | OXYGEN SATURATION: 95 % | BODY MASS INDEX: 29.74 KG/M2 | SYSTOLIC BLOOD PRESSURE: 105 MMHG | HEART RATE: 55 BPM | WEIGHT: 189.5 LBS | TEMPERATURE: 98.2 F | RESPIRATION RATE: 18 BRPM

## 2023-01-10 LAB
ANION GAP SERPL CALCULATED.3IONS-SCNC: 13 MMOL/L (ref 3–16)
BUN BLDV-MCNC: 17 MG/DL (ref 7–20)
CALCIUM SERPL-MCNC: 9.5 MG/DL (ref 8.3–10.6)
CHLORIDE BLD-SCNC: 102 MMOL/L (ref 99–110)
CO2: 21 MMOL/L (ref 21–32)
CREAT SERPL-MCNC: 0.9 MG/DL (ref 0.6–1.1)
GFR SERPL CREATININE-BSD FRML MDRD: >60 ML/MIN/{1.73_M2}
GLUCOSE BLD-MCNC: 171 MG/DL (ref 70–99)
HCT VFR BLD CALC: 39.6 % (ref 36–48)
HEMOGLOBIN: 12.9 G/DL (ref 12–16)
MCH RBC QN AUTO: 29.9 PG (ref 26–34)
MCHC RBC AUTO-ENTMCNC: 32.7 G/DL (ref 31–36)
MCV RBC AUTO: 91.5 FL (ref 80–100)
PDW BLD-RTO: 12.1 % (ref 12.4–15.4)
PLATELET # BLD: 328 K/UL (ref 135–450)
PMV BLD AUTO: 8.5 FL (ref 5–10.5)
POTASSIUM SERPL-SCNC: 4.3 MMOL/L (ref 3.5–5.1)
RBC # BLD: 4.33 M/UL (ref 4–5.2)
SODIUM BLD-SCNC: 136 MMOL/L (ref 136–145)
WBC # BLD: 10.9 K/UL (ref 4–11)

## 2023-01-10 PROCEDURE — 6370000000 HC RX 637 (ALT 250 FOR IP): Performed by: UROLOGY

## 2023-01-10 PROCEDURE — 80048 BASIC METABOLIC PNL TOTAL CA: CPT

## 2023-01-10 PROCEDURE — 6360000002 HC RX W HCPCS: Performed by: UROLOGY

## 2023-01-10 PROCEDURE — 85027 COMPLETE CBC AUTOMATED: CPT

## 2023-01-10 PROCEDURE — 36415 COLL VENOUS BLD VENIPUNCTURE: CPT

## 2023-01-10 RX ORDER — MORPHINE SULFATE 15 MG/1
15 TABLET ORAL EVERY 4 HOURS PRN
Qty: 15 TABLET | Refills: 0 | Status: SHIPPED | OUTPATIENT
Start: 2023-01-10 | End: 2023-01-13

## 2023-01-10 RX ORDER — OXYCODONE HYDROCHLORIDE 10 MG/1
10 TABLET ORAL
Qty: 20 TABLET | Refills: 0 | Status: SHIPPED | OUTPATIENT
Start: 2023-01-10 | End: 2023-01-10 | Stop reason: HOSPADM

## 2023-01-10 RX ORDER — OXYCODONE HYDROCHLORIDE 80 MG/1
80 TABLET, FILM COATED, EXTENDED RELEASE ORAL EVERY 12 HOURS SCHEDULED
Qty: 20 EACH | Refills: 0 | Status: SHIPPED | OUTPATIENT
Start: 2023-01-10 | End: 2023-01-10 | Stop reason: HOSPADM

## 2023-01-10 RX ORDER — CIPROFLOXACIN 500 MG/1
500 TABLET, FILM COATED ORAL 2 TIMES DAILY
Qty: 10 TABLET | Refills: 0 | Status: SHIPPED | OUTPATIENT
Start: 2023-01-10 | End: 2023-01-15

## 2023-01-10 RX ORDER — LORAZEPAM 0.5 MG/1
1 TABLET ORAL EVERY 4 HOURS PRN
Qty: 20 TABLET | Refills: 0 | Status: SHIPPED | OUTPATIENT
Start: 2023-01-10 | End: 2023-01-13

## 2023-01-10 RX ORDER — OXYCODONE HCL 40 MG/1
80 TABLET, FILM COATED, EXTENDED RELEASE ORAL EVERY 12 HOURS SCHEDULED
Status: DISCONTINUED | OUTPATIENT
Start: 2023-01-10 | End: 2023-01-10 | Stop reason: HOSPADM

## 2023-01-10 RX ADMIN — PANTOPRAZOLE SODIUM 40 MG: 40 TABLET, DELAYED RELEASE ORAL at 05:11

## 2023-01-10 RX ADMIN — OXYCODONE 10 MG: 5 TABLET ORAL at 03:40

## 2023-01-10 RX ADMIN — OXYBUTYNIN CHLORIDE 5 MG: 5 TABLET ORAL at 14:23

## 2023-01-10 RX ADMIN — MORPHINE SULFATE 4 MG: 4 INJECTION, SOLUTION INTRAMUSCULAR; INTRAVENOUS at 01:49

## 2023-01-10 RX ADMIN — OXYCODONE HYDROCHLORIDE 40 MG: 40 TABLET, FILM COATED, EXTENDED RELEASE ORAL at 09:47

## 2023-01-10 RX ADMIN — FLUTICASONE PROPIONATE 2 SPRAY: 50 SPRAY, METERED NASAL at 09:52

## 2023-01-10 RX ADMIN — DOCUSATE SODIUM 100 MG: 100 CAPSULE, LIQUID FILLED ORAL at 09:47

## 2023-01-10 RX ADMIN — TAMSULOSIN HYDROCHLORIDE 0.4 MG: 0.4 CAPSULE ORAL at 09:47

## 2023-01-10 RX ADMIN — PHENAZOPYRIDINE 200 MG: 100 TABLET ORAL at 14:27

## 2023-01-10 RX ADMIN — POLYETHYLENE GLYCOL 3350 17 G: 17 POWDER, FOR SOLUTION ORAL at 09:48

## 2023-01-10 RX ADMIN — MORPHINE SULFATE 4 MG: 4 INJECTION, SOLUTION INTRAMUSCULAR; INTRAVENOUS at 10:48

## 2023-01-10 RX ADMIN — PANTOPRAZOLE SODIUM 40 MG: 40 TABLET, DELAYED RELEASE ORAL at 14:23

## 2023-01-10 RX ADMIN — PHENAZOPYRIDINE 200 MG: 100 TABLET ORAL at 09:48

## 2023-01-10 RX ADMIN — LORAZEPAM 1 MG: 2 INJECTION INTRAMUSCULAR; INTRAVENOUS at 10:47

## 2023-01-10 RX ADMIN — OXYCODONE 10 MG: 5 TABLET ORAL at 14:22

## 2023-01-10 RX ADMIN — MORPHINE SULFATE 4 MG: 4 INJECTION, SOLUTION INTRAMUSCULAR; INTRAVENOUS at 05:11

## 2023-01-10 RX ADMIN — NALOXEGOL OXALATE 25 MG: 25 TABLET, FILM COATED ORAL at 05:11

## 2023-01-10 RX ADMIN — LORAZEPAM 1 MG: 2 INJECTION INTRAMUSCULAR; INTRAVENOUS at 06:17

## 2023-01-10 RX ADMIN — POLYETHYLENE GLYCOL 3350 17 G: 17 POWDER, FOR SOLUTION ORAL at 14:22

## 2023-01-10 RX ADMIN — CETIRIZINE HYDROCHLORIDE 5 MG: 10 TABLET, FILM COATED ORAL at 09:47

## 2023-01-10 RX ADMIN — OXYBUTYNIN CHLORIDE 5 MG: 5 TABLET ORAL at 09:48

## 2023-01-10 RX ADMIN — SENNOSIDES 17.2 MG: 8.6 TABLET, FILM COATED ORAL at 09:47

## 2023-01-10 ASSESSMENT — PAIN SCALES - GENERAL
PAINLEVEL_OUTOF10: 8
PAINLEVEL_OUTOF10: 9
PAINLEVEL_OUTOF10: 8
PAINLEVEL_OUTOF10: 7

## 2023-01-10 ASSESSMENT — PAIN DESCRIPTION - LOCATION
LOCATION: ABDOMEN
LOCATION: BACK
LOCATION: BACK

## 2023-01-10 ASSESSMENT — ENCOUNTER SYMPTOMS
ABDOMINAL PAIN: 1
SHORTNESS OF BREATH: 1
BACK PAIN: 1

## 2023-01-10 ASSESSMENT — PAIN DESCRIPTION - DESCRIPTORS
DESCRIPTORS: DISCOMFORT

## 2023-01-10 NOTE — DISCHARGE INSTR - COC
Continuity of Care Form    Patient Name: Sarah Perdomo   :  1990  MRN:  [id number]    Admit date:  2023  Discharge date:  ***    Code Status Order: DNR-CC   Advance Directives:     Admitting Physician:  Jhoana Borjas MD  PCP: Zoe uMrphy MD    Discharging Nurse: [facility] Unit/Room#: 4JX-2830/9618-41  Discharging Unit Phone Number: ***    Emergency Contact:   Extended Emergency Contact Information  Primary Emergency Contact: [address] Phone: [phone]  Relation: Parent    Past Surgical History:  Past Surgical History:   Procedure Laterality Date    CERVIX BIOPSY N/A 10/28/2021    CERVICAL BIOPSIES,  LOOP EXCISIONAL ELECTROCAUTERY PROCEDURE performed by Debra Silverman MD at [facility]  2022    CT BIOPSY ABDOMEN RETROPERITONEUM 2022 [facility] CT SCAN    CYSTOSCOPY Left 2023    CYSTOSCOPY, LEFT RETROGRADE PYELOGRAM, EXCHANGE OF LEFT URETERAL STENT performed by Odessa Sierra MD at [address] [place] [address] (CERVIX STATUS UNKNOWN)      IR URETERAL STENT PLACEMENT THRU EXIST TRACT  2023    IR URETERAL STENT PLACEMENT THRU EXIST TRACT 1/3/2023 [facility] SPECIAL PROCEDURES    LEEP  2021    STOMACH SURGERY  2021    sleeve gastrectomy    HERVE AND BSO (CERVIX REMOVED) N/A 2022    ABDOMINAL RADICAL HYSTERECTOMY, BILATERAL SALPINGECTOMY, SENTINEL LYMPH NODE BIOPSY, PELVIC LYMPH NODE DISSECTION, OVARIAN TRANSPOSITION ([phone], [id number], [id number]) performed by Debra Silverman MD at [facility] EXTRACTION         Immunization History:   Immunization History   Administered Date(s) Administered    Influenza Vaccine, unspecified formulation 10/15/2015       Active Problems:  Patient Active Problem List   Diagnosis Code    Malignant neoplasm of exocervix ([address] C53.1    Cervical cancer ([address] C53.9    Anxiety F41.9    UTI (urinary tract infection) N39.0    Anemia D64.9    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    Mixed bipolar disorder (HCC) F31.60    Bipolar disorder (HCC) F31.9    Cannabis abuse F12.10    Drug-induced constipation K59.03    Generalized anxiety disorder F41.1    History of total hysterectomy Z90.710    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       Isolation/Infection:   Isolation            No Isolation          Patient Infection Status       Infection Onset Added Last Indicated Last Indicated By Review Planned Expiration Resolved Resolved By    None active    Resolved    COVID-19 (Rule Out) 03/10/22 03/10/22 03/10/22 COVID-19 (Ordered)   22 Rule-Out Test Resulted            Nurse Assessment:  Last Vital Signs: /62   Pulse 60   Temp 98.5 °F (36.9 °C) (Oral)   Resp 18   Ht 5' 7\" (1.702 m)   Wt 189 lb 8 oz (86 kg)   LMP 2022 (Approximate)   SpO2 94%   BMI 29.68 kg/m²     Last documented pain score (0-10 scale): Pain Level: 7  Last Weight:   Wt Readings from Last 1 Encounters:   23 189 lb 8 oz (86 kg)     Mental Status:  {IP PT MENTAL STATUS:}    IV Access:  { BEN IV ACCESS:653706629}    Nursing Mobility/ADLs:  Walking   {CHP DME LSRX:801627399}  Transfer  {CHP DME PCYI:942162473}  Bathing  {CHP DME GLDC:001066579}  Dressing  {CHP DME WVXH:617633186}  Toileting  {CHP DME EOKW:927344523}  Feeding  {CHP DME JCV}  Med Admin  {CHP DME DPLD:359205135}  Med Delivery   { BEN MED Delivery:005558077}    Wound Care Documentation and Therapy:        Elimination:  Continence: Bowel: {YES / SL:19041}  Bladder: {YES / GM:16568}  Urinary Catheter: {Urinary Catheter:827740039}   Colostomy/Ileostomy/Ileal Conduit: {YES / PO:32255}       Date of Last BM: ***    Intake/Output Summary (Last 24 hours) at 1/10/2023 0942  Last data filed at 2023 1635  Gross per 24 hour   Intake 550 ml   Output 2 ml   Net 548 ml     I/O last 3 completed shifts:   In: 550 [I.V.:550]  Out: 2 [Blood:2]    Safety Concerns:     [facility] Safety Concerns:382197067}    Impairments/Disabilities:      [facility] Impairments/Disabilities:026847157}    Nutrition Therapy:  Current Nutrition Therapy:   [facility] Diet List:123695516}    Routes of Feeding: {CHP DME Other Feedings:129965975}  Liquids: {Slp liquid thickness:13022}  Daily Fluid Restriction: {CHP DME Yes amt example:752316929}  Last Modified Barium Swallow with Video (Video Swallowing Test): {Done Not Done AMANDA:850269303}    Treatments at the Time of Hospital Discharge:   Respiratory Treatments: ***  Oxygen Therapy:  {Therapy; copd oxygen:44861}  Ventilator:    { CC Vent DBUV:432283149}    Rehab Therapies: {THERAPEUTIC INTERVENTION:7251996541}  Weight Bearing Status/Restrictions: [facility] Weight Bearin}  Other Medical Equipment (for information only, NOT a DME order):  {EQUIPMENT:132745658}  Other Treatments: ***    Patient's personal belongings (please select all that are sent with patient):  {P DME Belongings:483760938}    RN SIGNATURE:  {Esignature:100152581}    CASE MANAGEMENT/SOCIAL WORK SECTION    Inpatient Status Date: 2023    Readmission Risk Assessment Score: 9%  Readmission Risk              Risk of Unplanned Readmission:  21           Discharging to Facility/ Agency   Hospice [address]   [place], [address] [place] [address]  Phone: [phone]  Fax: [phone]      / signature: Electronically signed by NUSRAT Adan on 1/10/2023 at 9:42 AM      PHYSICIAN SECTION    Prognosis: {Prognosis:2628195221}    Condition at Discharge: [facility] Patient Condition:512235390}    Rehab Potential (if transferring to Rehab): {Prognosis:4227501675}    Recommended Labs or Other Treatments After Discharge: ***    Physician Certification: I certify the above information and transfer of Maria Del Rosario Tan  is necessary for the continuing treatment of the diagnosis listed and that she requires {Admit to Appropriate Level [facility]:38055} for {GREATER/LESS:093288859} 30 days.      Update Admission H&P: {CHP DME Changes in AQZWU:655918009}    PHYSICIAN SIGNATURE:  {Esignature:935820663}

## 2023-01-10 NOTE — PROGRESS NOTES
PALLIATIVE MEDICINE PROGRESS NOTE     Patient name:Connie Ayers    GTS:[id number] :1990  [facility]    LOS: 5 days        ASSESSMENT/RECOMMENDATIONS     29 y.o. female with back pain and debility d/t metastatic cervical cancer        Symptom Management:  Back pain- related to cancer and stent placement, pt c/o 8/10 pain Her pain is constant and remains uncontrolled she is being DC with Hospice they are requesting conversion to morphine, increased and prescribed MSER 60mg BID and MSIR 15mg Q 3 hours. Debility- pt has been needing assistance with ADLs due to pain   Goals of Care- DC home with [address] today pt has no additional questions or needs. Patient/Family Goals of Care :      talked to pt and father at bedside regarding what her goal is pt is clear that she wants symptom management only no additional Tx, tests or procedures we had a melissa discussion that without Tx that her cancer will advance and she will die she is aware of this and states that when its her time she is ready updated to [facility] at her request and made referral to [facility]       Pt following to [address] her pain remains poorly controlled today she is against [place] [address] admission she wants to get pain controlled then go home. She continues to state that she is OK with Hospice care with her goal being symptom management and no aggressive Tx.     1/10  DC home with [address] today pt has no additional questions or needs. Advance Directives:  Code status:  DNR-CC  Decision Maker: Karen Brian- parent    Case Discussed with:  patient,  floor RN    Thank you for allowing us to participate in the care of this patient. SUBJECTIVE     Chief Complaint: Flank pain    Last 24 hours:   Pt rates pain 8/10 this am using all PRN options and remains poorly controlled    ROS:    Review of Systems   Constitutional:  Positive for activity change, appetite change and fatigue. Respiratory:  Positive for shortness of breath.     Gastrointestinal: Positive for abdominal pain. Genitourinary:  Positive for flank pain. Musculoskeletal:  Positive for back pain. Skin:  Positive for pallor. Neurological:  Positive for weakness. All other systems reviewed and are negative. OBJECTIVE   /64   Pulse 55   Temp 98.2 °F (36.8 °C) (Oral)   Resp 18   Ht 5' 7\" (1.702 m)   Wt 189 lb 8 oz (86 kg)   LMP 03/04/2022 (Approximate)   SpO2 95%   BMI 29.68 kg/m²   I/O last 3 completed shifts: In: 550 [I.V.:550]  Out: 2 [Blood:2]  I/O this shift:  In: 300 [P.O.:300]  Out: -       Physical Exam  Constitutional:       Appearance: She is ill-appearing. HENT:      Head: Normocephalic and atraumatic. Nose: No congestion. Mouth/Throat:      Mouth: Mucous membranes are dry. Eyes:      Pupils: Pupils are equal, round, and reactive to light. Cardiovascular:      Rate and Rhythm: Normal rate. Heart sounds: No murmur heard. No friction rub. No gallop. Pulmonary:      Effort: No respiratory distress. Abdominal:      Palpations: Abdomen is soft. Musculoskeletal:      Cervical back: Normal range of motion. Right lower leg: Edema present. Left lower leg: Edema present. Skin:     General: Skin is warm and dry. Coloration: Skin is pale. Neurological:      General: No focal deficit present.             Total time: 65 minutes  >50% of time spent counseling patient at bedside or POA/family member if applicable , reviewing information and discussing care, coordinating with care team       Signed By: Electronically signed by TAYE Renae CNP on 1/10/2023 at 1:28 PM   Palliative Medicine   [phone]    January 10, 2023

## 2023-01-10 NOTE — PLAN OF CARE
Problem: Pain  Goal: Verbalizes/displays adequate comfort level or baseline comfort level  1/10/2023 0218 by Ian Barkley RN  Outcome: Progressing  1/9/2023 1859 by Dulce Zavala RN  Outcome: Progressing     Problem: Safety - Adult  Goal: Free from fall injury  1/10/2023 0218 by Ian Barkley RN  Outcome: Progressing  1/9/2023 1859 by Dulce Zavala RN  Outcome: Progressing     Problem: ABCDS Injury Assessment  Goal: Absence of physical injury  1/10/2023 0218 by Ian Barkley RN  Outcome: Progressing  1/9/2023 1859 by Dulce Zavala RN  Outcome: Progressing     Problem: Discharge Planning  Goal: Discharge to home or other facility with appropriate resources  1/10/2023 0218 by Ian Barkley RN  Outcome: Progressing  1/9/2023 1859 by Dulce Zavala RN  Outcome: Progressing

## 2023-01-10 NOTE — PROGRESS NOTES
CLINICAL PHARMACY NOTE: MEDS TO BEDS    Total # of Prescriptions Filled: 4   The following medications were delivered to the patient:  Morphine ER 60  Morphine 15  Ativan 0.5 mg  Cipro     Additional Documentation:  Delivered to [address] [place] [address]

## 2023-01-10 NOTE — PLAN OF CARE
Problem: Pain  Goal: Verbalizes/displays adequate comfort level or baseline comfort level  1/10/2023 1609 by Mike Mercado RN  Outcome: Progressing  1/10/2023 0218 by Marii Marte RN  Outcome: Progressing     Problem: Safety - Adult  Goal: Free from fall injury  1/10/2023 1609 by Mike Mercado RN  Outcome: Progressing  1/10/2023 0218 by Marii Marte RN  Outcome: Progressing     Problem: ABCDS Injury Assessment  Goal: Absence of physical injury  1/10/2023 1609 by Mike Mercado RN  Outcome: Progressing  1/10/2023 0218 by Marii Marte RN  Outcome: Progressing     Problem: Discharge Planning  Goal: Discharge to home or other facility with appropriate resources  1/10/2023 1609 by Mike Mercado RN  Outcome: Progressing  1/10/2023 0218 by Marii Marte RN  Outcome: Progressing

## 2023-01-10 NOTE — DISCHARGE SUMMARY
Hospital Medicine Discharge Summary    Patient ID: Brit Kline      Patient's PCP: Jessica Ureña MD    Admit Date: 1/5/2023     Discharge Date:   1/10/23    Admitting Provider: Chuck Rod MD     Discharge Provider: Sindi Mills MD     Discharge Diagnoses: Active Hospital Problems    Diagnosis     UTI (urinary tract infection) [N39.0]      Priority: Medium       The patient was seen and examined on day of discharge and this discharge summary is in conjunction with any daily progress note from day of discharge. Hospital Course:     29-year-old female with a history of obesity, anxiety, metastatic cervical cancer, left-sided hydronephrosis status post and placement on 1/3/2023 came in with left flank and abdominal pain. CT abdomen showed air in the kidney which may be secondary to recent instrumentation versus infection related. started on IV antibiotics. Urology consulted. Stent exchanged on 1/9/2023. Given advanced malignancy with repeated hospitalizations needing procedures, palliative care consulted. Subsequently hospice consulted. Patient was discharged home with hospice with antibiotics and pain medications. Physical Exam Performed:     /64   Pulse 55   Temp 98.2 °F (36.8 °C) (Oral)   Resp 18   Ht 5' 7\" (1.702 m)   Wt 189 lb 8 oz (86 kg)   LMP 03/04/2022 (Approximate)   SpO2 95%   BMI 29.68 kg/m²       General appearance:  No apparent distress, appears stated age and cooperative. HEENT:  Normal cephalic, atraumatic without obvious deformity. Pupils equal, round, and reactive to light. Extra ocular muscles intact. Conjunctivae/corneas clear. Neck: Supple, with full range of motion. No jugular venous distention. Trachea midline. Respiratory:  Normal respiratory effort. Clear to auscultation, bilaterally without Rales/Wheezes/Rhonchi. Cardiovascular:  Regular rate and rhythm with normal S1/S2 without murmurs, rubs or gallops.   Abdomen: Soft, non-tender, non-distended with normal bowel sounds. Musculoskeletal:  No clubbing, cyanosis or edema bilaterally. Full range of motion without deformity. Skin: Skin color, texture, turgor normal.  No rashes or lesions. Neurologic:  Neurovascularly intact without any focal sensory/motor deficits. Cranial nerves: II-XII intact, grossly non-focal.  Psychiatric:  Alert and oriented, thought content appropriate, normal insight  Capillary Refill: Brisk,< 3 seconds   Peripheral Pulses: +2 palpable, equal bilaterally       Labs: For convenience and continuity at follow-up the following most recent labs are provided:      CBC:    Lab Results   Component Value Date/Time    WBC 10.9 01/10/2023 04:51 AM    HGB 12.9 01/10/2023 04:51 AM    HCT 39.6 01/10/2023 04:51 AM     01/10/2023 04:51 AM       Renal:    Lab Results   Component Value Date/Time     01/10/2023 04:51 AM    K 4.3 01/10/2023 04:51 AM    K 4.2 03/17/2022 09:09 AM     01/10/2023 04:51 AM    CO2 21 01/10/2023 04:51 AM    BUN 17 01/10/2023 04:51 AM    CREATININE 0.9 01/10/2023 04:51 AM    CALCIUM 9.5 01/10/2023 04:51 AM    PHOS 4.5 03/15/2022 06:09 AM         Significant Diagnostic Studies    Radiology:   FL RETROGRADE PYELOGRAM LEFT   Final Result   Intraprocedural fluoroscopic spot images as above. See separate procedure   report for more information. CT ABDOMEN PELVIS WO CONTRAST Additional Contrast? None   Final Result   1. Interval enlargement of the previously identified left pelvic soft tissue   mass. 2.  There is a left ureteral stent in place. There is no evidence of   hydronephrosis on the left. However, there is a small amount of air within   the left renal pelvis. There is also a small amount of air within the   urinary bladder. This could be secondary to recent instrumentation.    However, if there is no recent history of instrumentation then this could   represent a bladder/vaginal fistula possibly related to the left pelvic mass   which abuts the vagina or possibly a fistula between the urinary bladder and   adjacent sigmoid colon. Consults:     IP CONSULT TO UROLOGY  IP CONSULT TO ONCOLOGY  IP CONSULT TO HOSPICE  IP CONSULT TO PALLIATIVE CARE  IP CONSULT TO HOSPICE    Disposition:   home     Condition at Discharge: Stable    Discharge Instructions/Follow-up:   urology and PCP     Code Status:  DNR-CC      Activity: activity as tolerated    Diet: regular diet      Discharge Medications:     Current Discharge Medication List             Details   LORazepam (ATIVAN) 0.5 MG tablet Take 2 tablets by mouth every 4 hours as needed for Anxiety for up to 3 days. 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       acetaminophen (TYLENOL) 500 MG tablet Take 1 tablet by mouth every 6 hours as needed for Pain  Qty: 30 tablet, Refills: 0      senna (SENOKOT) 8.6 MG tablet Take 2 tablets by mouth nightly as needed for Constipation  Qty: 10 tablet, Refills: 0      Calcium Citrate-Vitamin D (CITRACAL + D PO) Take by mouth      Multiple Vitamin (MVI, BARIATRIC ADVANTAGE VITABAND, CHEW TAB) Take 1 tablet by mouth daily      budesonide-formoterol (SYMBICORT) 160-4.5 MCG/ACT AERO INHALE 2 PUFFS BY MOUTH INTO THE LUNGS TWO TIMES A DAY      albuterol sulfate  (90 Base) MCG/ACT inhaler INHALE 2 PUFFS INTO THE LUNGS EVERY 4 TO 6 HOURS AS NEEDED      loratadine (CLARITIN) 10 MG tablet Take 10 mg by mouth daily      traZODone (DESYREL) 50 MG tablet Take 50 mg by mouth nightly      albuterol (PROVENTIL) (2.5 MG/3ML) 0.083% nebulizer solution Take 2.5 mg by nebulization every 6 hours as needed for Wheezing      omeprazole (PRILOSEC) 10 MG capsule Take 10 mg by mouth in the morning and at bedtime       mometasone (NASONEX) 50 MCG/ACT nasal spray 2 sprays by Nasal route daily             Time Spent on discharge: 39 mts in the examination, evaluation, counseling and review of medications and discharge plan. Signed:    Ariana Love MD   1/10/2023      Thank you Liliana Mills MD for the opportunity to be involved in this patient's care. If you have any questions or concerns, please feel free to contact me at [phone].

## 2023-01-10 NOTE — PROGRESS NOTES
Urology Progress Note  [facility]    Provider: TAYE Womack CNP, MD Patient ID:  Admission Date: 2023 Name: Sukumar Allen  OR Date: 2023 MRN: [id number]   Patient Location: [id number] : 1990  Attending: Bill Eaton MD Date of Service: 1/10/2023  PCP: Flavio Lennox, MD     Diagnosis:   Flank Pain     ASSESSMENT/PLAN      30 yo female with hx of of advanced cervical cancer, metastasizing to the left ovary, with left hydronephrosis. Dr. Memo Queen had attempted cysto retrograde stent placement but unable to get stent up. Recently had a LEFT antegrade stent insertion per IR 2022. She is admitted with severe pelvic pain and left flank pain associated with nausea and vomiting. UA is possibly infected, although possibly contaminated as well. Air in the kidney may be 2/2 to recent instrumentation vs pyelonephritis. She does not want further care, she just wants palliative/hospice care.   ==  Ucx NGTD. Still with significant pain from stent requiring IV narcotic     Recommendations:  - Stent exchange 2022  - if no improvement options of observation,  Removal of stent completely (known risks of worsening pain, renal failure, needs for repeat IR procedure and inability to replace the tube) and stent removal with PNT placement alone (she is refusing this)  - She believes stent exchange is helping with discomfort, although just received narcotics  -can discharge per      All the patients questions were answered. She understands the plan as listed above. Subjective:   Sukumar Allen is a 29 y.o. female. She was seen and examined this morning.  Today we discussed plan for OR today, some confusion as though needed to be done in IR      Objective:   Vitals:  Vitals:    01/10/23 0339   BP: 108/68   Pulse: 61   Resp: 18   Temp: 97.8 °F (36.6 °C)   SpO2: 96%       Intake/Output Summary (Last 24 hours) at 1/10/2023 0839  Last data filed at 2023 1635  Gross per 24 hour   Intake 550 ml   Output 2 ml   Net 548 ml     Physical Exam:  Gen: Alert and oriented x3, no acute distress    Labs:  Lab Results   Component Value Date    WBC 10.9 01/10/2023    HGB 12.9 01/10/2023    HCT 39.6 01/10/2023    MCV 91.5 01/10/2023     01/10/2023     Lab Results   Component Value Date    CREATININE 0.9 01/10/2023    BUN 17 01/10/2023     01/10/2023    K 4.3 01/10/2023     01/10/2023    CO2 21 01/10/2023       Lazarus Dials, APRN - CNP MD  1/10/2023

## 2023-01-10 NOTE — PROGRESS NOTES
Pt addressed some concerns about her kidney medications. I spoke with Angeline Bradley will handle everything tomorrow morning.

## 2023-01-10 NOTE — CARE COORDINATION
Discharge note:      CM/SW has been notified of discharge. Patient noted to have the following needs at discharge. CM/SW has coordinated the following services: Hospice. Hospice [address]  Phone: [phone]  Fax: [phone]        Discharge Destination: Home. Transportation: Family. Comment: N/A. All CM/SW needs met, will sign off.     Electronically signed by NUSRAT Steward on 1/10/2023 at 4:04 PM

## 2023-02-04 PROBLEM — N39.0 UTI (URINARY TRACT INFECTION): Status: RESOLVED | Noted: 2023-01-05 | Resolved: 2023-02-04

## 2023-02-08 NOTE — PROGRESS NOTES
Physician Progress Note      Chirag Haq  CSN #:                  [id number]  :                       1990  ADMIT DATE:       2023 11:24 AM  DISCH DATE:        1/10/2023 4:40 PM  RESPONDING  PROVIDER #:        Sae Husain MD          QUERY TEXT:    Pt admitted with left flank and abdominal pain and underwent ureteral stent   placement on 1/3/23, noted documentation of acute pyelonephritis and PMH of   cervical cancer, metastasizing to the ovary. If possible, please document in   progress notes and discharge summary the relationship if any between the pain,   pyelonephritis and the surgery: The medical record reflects the following:  Risk Factors: recent stent placement,  Clinical Indicators:  Discharge summary documented patient presented with   \"left-sided hydronephrosis status post and placement on 1/3/2023 came in with   left flank and abdominal pain. CT abdomen showed air in the kidney which may   be secondary to recent instrumentation versus infection related. started on   IV antibiotics. Urology consulted. Stent exchanged on 2023. Given   advanced malignancy with repeated hospitalizations needing procedures,   palliative care consulted. Subsequently hospice consulted.   Patient was   discharged home with hospice with antibiotics and pain   Negative urine culture  Treatment: IV antibiotics, stent exchanged, urine culture  Options provided:  -- Left flank and abdominal pain?is due to the procedure, with pyelonephritis   confirmed  -- Left flank and abdominal pain?is due to the procedure, with pyelonephritis   ruled out  -- Pyelonephritis is due to the procedure  -- Other - I will add my own diagnosis  -- Disagree - Not applicable / Not valid  -- Disagree - Clinically unable to determine / Unknown  -- Refer to Clinical Documentation Reviewer    PROVIDER RESPONSE TEXT:    Patient has left flank and abdominal pain due to the procedure, with pyelonephritis ruled out.     Query created by: Aria Belle on 2/3/2023 10:34 AM      Electronically signed by:  Miriam Salcedo MD 2/7/2023 9:56 PM

## 2023-03-30 ENCOUNTER — HOSPITAL ENCOUNTER (OUTPATIENT)
Dept: CT IMAGING | Age: 33
Discharge: HOME | End: 2023-03-30
Payer: COMMERCIAL

## 2023-03-30 DIAGNOSIS — C53.9 MALIGNANT NEOPLASM OF CERVIX, UNSPECIFIED SITE (HCC): ICD-10-CM

## 2023-03-30 PROCEDURE — 6360000004 HC RX CONTRAST MEDICATION: Performed by: OBSTETRICS & GYNECOLOGY

## 2023-03-30 PROCEDURE — 74177 CT ABD & PELVIS W/CONTRAST: CPT

## 2023-03-30 RX ADMIN — IOPAMIDOL 50 ML: 612 INJECTION, SOLUTION INTRAVENOUS at 14:58

## 2023-03-30 RX ADMIN — IOPAMIDOL 75 ML: 755 INJECTION, SOLUTION INTRAVENOUS at 14:59

## 2023-03-30 RX ADMIN — DIATRIZOATE MEGLUMINE AND DIATRIZOATE SODIUM 20 ML: 600; 100 SOLUTION ORAL; RECTAL at 15:51

## 2023-04-26 ENCOUNTER — HOSPITAL ENCOUNTER (OUTPATIENT)
Dept: ONCOLOGY | Age: 33
Setting detail: INFUSION SERIES
End: 2023-04-26

## 2023-04-27 ENCOUNTER — HOSPITAL ENCOUNTER (OUTPATIENT)
Dept: ONCOLOGY | Age: 33
Setting detail: INFUSION SERIES
Discharge: HOME | End: 2023-04-27
Payer: COMMERCIAL

## 2023-04-27 VITALS
OXYGEN SATURATION: 96 % | SYSTOLIC BLOOD PRESSURE: 113 MMHG | DIASTOLIC BLOOD PRESSURE: 64 MMHG | HEART RATE: 85 BPM | TEMPERATURE: 97.4 F | RESPIRATION RATE: 16 BRPM

## 2023-04-27 DIAGNOSIS — D64.9 ANEMIA, UNSPECIFIED TYPE: Primary | ICD-10-CM

## 2023-04-27 LAB
ABO + RH BLD: NORMAL
BLD GP AB SCN SERPL QL: NORMAL
BLOOD BANK DISPENSE STATUS: NORMAL
BLOOD BANK DISPENSE STATUS: NORMAL
BLOOD BANK PRODUCT CODE: NORMAL
BLOOD BANK PRODUCT CODE: NORMAL
BPU ID: NORMAL
BPU ID: NORMAL
DESCRIPTION BLOOD BANK: NORMAL
DESCRIPTION BLOOD BANK: NORMAL

## 2023-04-27 PROCEDURE — 6370000000 HC RX 637 (ALT 250 FOR IP): Performed by: OBSTETRICS & GYNECOLOGY

## 2023-04-27 PROCEDURE — P9016 RBC LEUKOCYTES REDUCED: HCPCS

## 2023-04-27 PROCEDURE — 86850 RBC ANTIBODY SCREEN: CPT

## 2023-04-27 PROCEDURE — 86900 BLOOD TYPING SEROLOGIC ABO: CPT

## 2023-04-27 PROCEDURE — 86923 COMPATIBILITY TEST ELECTRIC: CPT

## 2023-04-27 PROCEDURE — 36430 TRANSFUSION BLD/BLD COMPNT: CPT

## 2023-04-27 PROCEDURE — 86901 BLOOD TYPING SEROLOGIC RH(D): CPT

## 2023-04-27 RX ORDER — SODIUM CHLORIDE 9 MG/ML
INJECTION, SOLUTION INTRAVENOUS CONTINUOUS
Status: DISCONTINUED | OUTPATIENT
Start: 2023-04-27 | End: 2023-04-28 | Stop reason: HOSPADM

## 2023-04-27 RX ORDER — ONDANSETRON 2 MG/ML
8 INJECTION INTRAMUSCULAR; INTRAVENOUS
Status: DISCONTINUED | OUTPATIENT
Start: 2023-04-27 | End: 2023-04-28 | Stop reason: HOSPADM

## 2023-04-27 RX ORDER — DIPHENHYDRAMINE HCL 25 MG
25 TABLET ORAL ONCE
Status: COMPLETED | OUTPATIENT
Start: 2023-04-27 | End: 2023-04-27

## 2023-04-27 RX ORDER — EPINEPHRINE 1 MG/ML
0.3 INJECTION, SOLUTION INTRAMUSCULAR; SUBCUTANEOUS PRN
OUTPATIENT
Start: 2023-04-27

## 2023-04-27 RX ORDER — EPINEPHRINE 1 MG/ML
0.3 INJECTION, SOLUTION INTRAMUSCULAR; SUBCUTANEOUS PRN
Status: DISCONTINUED | OUTPATIENT
Start: 2023-04-27 | End: 2023-04-28 | Stop reason: HOSPADM

## 2023-04-27 RX ORDER — SODIUM CHLORIDE 9 MG/ML
25 INJECTION, SOLUTION INTRAVENOUS PRN
Status: DISCONTINUED | OUTPATIENT
Start: 2023-04-27 | End: 2023-04-28 | Stop reason: HOSPADM

## 2023-04-27 RX ORDER — ACETAMINOPHEN 325 MG/1
650 TABLET ORAL
Status: DISCONTINUED | OUTPATIENT
Start: 2023-04-27 | End: 2023-04-28 | Stop reason: HOSPADM

## 2023-04-27 RX ORDER — ALBUTEROL SULFATE 90 UG/1
4 AEROSOL, METERED RESPIRATORY (INHALATION) PRN
Status: DISCONTINUED | OUTPATIENT
Start: 2023-04-27 | End: 2023-04-28 | Stop reason: HOSPADM

## 2023-04-27 RX ORDER — SODIUM CHLORIDE 9 MG/ML
20 INJECTION, SOLUTION INTRAVENOUS CONTINUOUS
Status: DISCONTINUED | OUTPATIENT
Start: 2023-04-27 | End: 2023-04-28 | Stop reason: HOSPADM

## 2023-04-27 RX ORDER — ALBUTEROL SULFATE 90 UG/1
4 AEROSOL, METERED RESPIRATORY (INHALATION) PRN
OUTPATIENT
Start: 2023-04-27

## 2023-04-27 RX ORDER — SODIUM CHLORIDE 9 MG/ML
25 INJECTION, SOLUTION INTRAVENOUS PRN
Status: CANCELLED | OUTPATIENT
Start: 2023-04-27

## 2023-04-27 RX ORDER — SODIUM CHLORIDE 9 MG/ML
20 INJECTION, SOLUTION INTRAVENOUS CONTINUOUS
Status: CANCELLED | OUTPATIENT
Start: 2023-04-27

## 2023-04-27 RX ORDER — DIPHENHYDRAMINE HYDROCHLORIDE 50 MG/ML
50 INJECTION INTRAMUSCULAR; INTRAVENOUS
Status: DISCONTINUED | OUTPATIENT
Start: 2023-04-27 | End: 2023-04-28 | Stop reason: HOSPADM

## 2023-04-27 RX ORDER — ONDANSETRON 2 MG/ML
8 INJECTION INTRAMUSCULAR; INTRAVENOUS
OUTPATIENT
Start: 2023-04-27

## 2023-04-27 RX ORDER — DIPHENHYDRAMINE HYDROCHLORIDE 50 MG/ML
50 INJECTION INTRAMUSCULAR; INTRAVENOUS
OUTPATIENT
Start: 2023-04-27

## 2023-04-27 RX ORDER — SODIUM CHLORIDE 0.9 % (FLUSH) 0.9 %
5-40 SYRINGE (ML) INJECTION PRN
Status: DISCONTINUED | OUTPATIENT
Start: 2023-04-27 | End: 2023-04-28 | Stop reason: HOSPADM

## 2023-04-27 RX ORDER — SODIUM CHLORIDE 0.9 % (FLUSH) 0.9 %
5-40 SYRINGE (ML) INJECTION PRN
Status: CANCELLED | OUTPATIENT
Start: 2023-04-27

## 2023-04-27 RX ORDER — ACETAMINOPHEN 325 MG/1
650 TABLET ORAL
OUTPATIENT
Start: 2023-04-27

## 2023-04-27 RX ORDER — DIPHENHYDRAMINE HCL 25 MG
25 TABLET ORAL ONCE
Status: CANCELLED | OUTPATIENT
Start: 2023-04-27 | End: 2023-04-27

## 2023-04-27 RX ORDER — SODIUM CHLORIDE 9 MG/ML
INJECTION, SOLUTION INTRAVENOUS CONTINUOUS
OUTPATIENT
Start: 2023-04-27

## 2023-04-27 RX ORDER — ACETAMINOPHEN 325 MG/1
650 TABLET ORAL ONCE
Status: CANCELLED | OUTPATIENT
Start: 2023-04-27 | End: 2023-04-27

## 2023-04-27 RX ORDER — ACETAMINOPHEN 325 MG/1
650 TABLET ORAL ONCE
Status: COMPLETED | OUTPATIENT
Start: 2023-04-27 | End: 2023-04-27

## 2023-04-27 RX ADMIN — ACETAMINOPHEN 650 MG: 325 TABLET ORAL at 10:45

## 2023-04-27 RX ADMIN — DIPHENHYDRAMINE HCL 25 MG: 25 TABLET ORAL at 10:45

## 2023-04-27 NOTE — PROGRESS NOTES
Patient's hemoglobin:  6.5  Patient's platelet count:  132  Pt seen at [facility] today for   2 units of PRBCs   for above lab values. Informed consent verified. Pre-medications administered as ordered. 650 mg of Tylenol and 25 mg of Benadryl. Transfused per policy. Pt tolerated transfusion well and without incident. Pt verbalizes understanding of discharge instructions. Discharged to her home with her Mother.

## 2023-05-04 ENCOUNTER — APPOINTMENT (OUTPATIENT)
Dept: CT IMAGING | Age: 33
End: 2023-05-04
Payer: COMMERCIAL

## 2023-05-04 ENCOUNTER — APPOINTMENT (OUTPATIENT)
Dept: GENERAL RADIOLOGY | Age: 33
End: 2023-05-04
Payer: COMMERCIAL

## 2023-05-04 ENCOUNTER — HOSPITAL ENCOUNTER (INPATIENT)
Age: 33
LOS: 1 days | Discharge: HOSPICE | End: 2023-05-05
Attending: STUDENT IN AN ORGANIZED HEALTH CARE EDUCATION/TRAINING PROGRAM | Admitting: INTERNAL MEDICINE
Payer: COMMERCIAL

## 2023-05-04 DIAGNOSIS — C53.9 MALIGNANT NEOPLASM OF CERVIX, UNSPECIFIED SITE (HCC): ICD-10-CM

## 2023-05-04 DIAGNOSIS — E87.5 HYPERKALEMIA: ICD-10-CM

## 2023-05-04 DIAGNOSIS — D72.829 LEUKOCYTOSIS, UNSPECIFIED TYPE: ICD-10-CM

## 2023-05-04 DIAGNOSIS — Z51.5 HOSPICE CARE PATIENT: Primary | ICD-10-CM

## 2023-05-04 DIAGNOSIS — N13.30 HYDRONEPHROSIS, UNSPECIFIED HYDRONEPHROSIS TYPE: ICD-10-CM

## 2023-05-04 DIAGNOSIS — N17.9 ACUTE RENAL FAILURE, UNSPECIFIED ACUTE RENAL FAILURE TYPE (HCC): ICD-10-CM

## 2023-05-04 DIAGNOSIS — D64.9 ANEMIA, UNSPECIFIED TYPE: ICD-10-CM

## 2023-05-04 DIAGNOSIS — R10.9 FLANK PAIN: ICD-10-CM

## 2023-05-04 LAB
ABO + RH BLD: NORMAL
ALBUMIN SERPL-MCNC: 3 G/DL (ref 3.4–5)
ALBUMIN/GLOB SERPL: 0.9 {RATIO} (ref 1.1–2.2)
ALP SERPL-CCNC: 97 U/L (ref 40–129)
ALT SERPL-CCNC: 10 U/L (ref 10–40)
ANION GAP SERPL CALCULATED.3IONS-SCNC: 25 MMOL/L (ref 3–16)
AST SERPL-CCNC: 43 U/L (ref 15–37)
BASE EXCESS BLDV CALC-SCNC: -19.8 MMOL/L (ref -3–3)
BASOPHILS # BLD: 0 K/UL (ref 0–0.2)
BASOPHILS NFR BLD: 0.2 %
BILIRUB SERPL-MCNC: <0.2 MG/DL (ref 0–1)
BLD GP AB SCN SERPL QL: NORMAL
BLOOD BANK DISPENSE STATUS: NORMAL
BLOOD BANK PRODUCT CODE: NORMAL
BPU ID: NORMAL
BUN SERPL-MCNC: 109 MG/DL (ref 7–20)
CALCIUM SERPL-MCNC: 9.2 MG/DL (ref 8.3–10.6)
CHLORIDE SERPL-SCNC: 86 MMOL/L (ref 99–110)
CO2 BLDV-SCNC: 20 MMOL/L
CO2 SERPL-SCNC: 9 MMOL/L (ref 21–32)
COHGB MFR BLDV: 5.8 % (ref 0–1.5)
CREAT SERPL-MCNC: 13.4 MG/DL (ref 0.6–1.1)
DEPRECATED RDW RBC AUTO: 14.3 % (ref 12.4–15.4)
DESCRIPTION BLOOD BANK: NORMAL
EOSINOPHIL # BLD: 0.3 K/UL (ref 0–0.6)
EOSINOPHIL NFR BLD: 2.1 %
GFR SERPLBLD CREATININE-BSD FMLA CKD-EPI: 3 ML/MIN/{1.73_M2}
GLUCOSE SERPL-MCNC: 114 MG/DL (ref 70–99)
HCO3 BLDV-SCNC: 7.9 MMOL/L (ref 23–29)
HCT VFR BLD AUTO: 20.7 % (ref 36–48)
HGB BLD-MCNC: 6.8 G/DL (ref 12–16)
LACTATE BLDV-SCNC: 0.4 MMOL/L (ref 0.4–1.9)
LYMPHOCYTES # BLD: 0.6 K/UL (ref 1–5.1)
LYMPHOCYTES NFR BLD: 4 %
MCH RBC QN AUTO: 29 PG (ref 26–34)
MCHC RBC AUTO-ENTMCNC: 32.8 G/DL (ref 31–36)
MCV RBC AUTO: 88.4 FL (ref 80–100)
METHGB MFR BLDV: 0.3 %
MONOCYTES # BLD: 0.9 K/UL (ref 0–1.3)
MONOCYTES NFR BLD: 5.6 %
NEUTROPHILS # BLD: 14.2 K/UL (ref 1.7–7.7)
NEUTROPHILS NFR BLD: 88.1 %
O2 CT VFR BLDV CALC: 9 VOL %
O2 THERAPY: ABNORMAL
PCO2 BLDV: 24.9 MMHG (ref 40–50)
PH BLDV: 7.11 [PH] (ref 7.35–7.45)
PLATELET # BLD AUTO: 405 K/UL (ref 135–450)
PMV BLD AUTO: 7.6 FL (ref 5–10.5)
PO2 BLDV: 183 MMHG (ref 25–40)
POTASSIUM SERPL-SCNC: 5.6 MMOL/L (ref 3.5–5.1)
PROCALCITONIN SERPL IA-MCNC: 1.03 NG/ML (ref 0–0.15)
PROT SERPL-MCNC: 6.5 G/DL (ref 6.4–8.2)
RBC # BLD AUTO: 2.34 M/UL (ref 4–5.2)
SAO2 % BLDV: 99 %
SODIUM SERPL-SCNC: 120 MMOL/L (ref 136–145)
WBC # BLD AUTO: 16.1 K/UL (ref 4–11)

## 2023-05-04 PROCEDURE — 86850 RBC ANTIBODY SCREEN: CPT

## 2023-05-04 PROCEDURE — 2580000003 HC RX 258: Performed by: STUDENT IN AN ORGANIZED HEALTH CARE EDUCATION/TRAINING PROGRAM

## 2023-05-04 PROCEDURE — 99285 EMERGENCY DEPT VISIT HI MDM: CPT

## 2023-05-04 PROCEDURE — 87040 BLOOD CULTURE FOR BACTERIA: CPT

## 2023-05-04 PROCEDURE — 1200000000 HC SEMI PRIVATE

## 2023-05-04 PROCEDURE — 74176 CT ABD & PELVIS W/O CONTRAST: CPT

## 2023-05-04 PROCEDURE — P9016 RBC LEUKOCYTES REDUCED: HCPCS

## 2023-05-04 PROCEDURE — 36415 COLL VENOUS BLD VENIPUNCTURE: CPT

## 2023-05-04 PROCEDURE — 83605 ASSAY OF LACTIC ACID: CPT

## 2023-05-04 PROCEDURE — 6360000002 HC RX W HCPCS: Performed by: STUDENT IN AN ORGANIZED HEALTH CARE EDUCATION/TRAINING PROGRAM

## 2023-05-04 PROCEDURE — 84145 PROCALCITONIN (PCT): CPT

## 2023-05-04 PROCEDURE — 96375 TX/PRO/DX INJ NEW DRUG ADDON: CPT

## 2023-05-04 PROCEDURE — 80053 COMPREHEN METABOLIC PANEL: CPT

## 2023-05-04 PROCEDURE — 86901 BLOOD TYPING SEROLOGIC RH(D): CPT

## 2023-05-04 PROCEDURE — 71045 X-RAY EXAM CHEST 1 VIEW: CPT

## 2023-05-04 PROCEDURE — 85025 COMPLETE CBC W/AUTO DIFF WBC: CPT

## 2023-05-04 PROCEDURE — 93005 ELECTROCARDIOGRAM TRACING: CPT | Performed by: STUDENT IN AN ORGANIZED HEALTH CARE EDUCATION/TRAINING PROGRAM

## 2023-05-04 PROCEDURE — 82803 BLOOD GASES ANY COMBINATION: CPT

## 2023-05-04 PROCEDURE — 86900 BLOOD TYPING SEROLOGIC ABO: CPT

## 2023-05-04 PROCEDURE — 96365 THER/PROPH/DIAG IV INF INIT: CPT

## 2023-05-04 PROCEDURE — 86923 COMPATIBILITY TEST ELECTRIC: CPT

## 2023-05-04 PROCEDURE — 2580000003 HC RX 258: Performed by: INTERNAL MEDICINE

## 2023-05-04 PROCEDURE — 30233N1 TRANSFUSION OF NONAUTOLOGOUS RED BLOOD CELLS INTO PERIPHERAL VEIN, PERCUTANEOUS APPROACH: ICD-10-PCS | Performed by: INTERNAL MEDICINE

## 2023-05-04 PROCEDURE — 2500000003 HC RX 250 WO HCPCS: Performed by: INTERNAL MEDICINE

## 2023-05-04 RX ORDER — PROCHLORPERAZINE MALEATE 10 MG
10 TABLET ORAL EVERY 6 HOURS PRN
COMMUNITY

## 2023-05-04 RX ORDER — LORAZEPAM 1 MG/1
1 TABLET ORAL EVERY 6 HOURS PRN
Status: ON HOLD | COMMUNITY
End: 2023-05-05 | Stop reason: HOSPADM

## 2023-05-04 RX ORDER — SODIUM CHLORIDE 9 MG/ML
INJECTION, SOLUTION INTRAVENOUS PRN
Status: DISCONTINUED | OUTPATIENT
Start: 2023-05-04 | End: 2023-05-05 | Stop reason: HOSPADM

## 2023-05-04 RX ORDER — TRAMADOL HYDROCHLORIDE 50 MG/1
50 TABLET ORAL EVERY 6 HOURS PRN
Status: ON HOLD | COMMUNITY
End: 2023-05-05 | Stop reason: HOSPADM

## 2023-05-04 RX ORDER — CYCLOBENZAPRINE HCL 5 MG
5 TABLET ORAL 3 TIMES DAILY PRN
COMMUNITY

## 2023-05-04 RX ORDER — PROMETHAZINE HYDROCHLORIDE 12.5 MG/1
12.5 TABLET ORAL EVERY 6 HOURS PRN
COMMUNITY

## 2023-05-04 RX ORDER — FENTANYL 75 UG/H
1 PATCH TRANSDERMAL
Status: ON HOLD | COMMUNITY
End: 2023-05-05 | Stop reason: HOSPADM

## 2023-05-04 RX ORDER — PHENAZOPYRIDINE HYDROCHLORIDE 200 MG/1
200 TABLET, FILM COATED ORAL 3 TIMES DAILY PRN
Status: ON HOLD | COMMUNITY
End: 2023-05-05 | Stop reason: HOSPADM

## 2023-05-04 RX ORDER — OXYCODONE HYDROCHLORIDE 20 MG/1
20 TABLET ORAL EVERY 4 HOURS PRN
Status: ON HOLD | COMMUNITY
End: 2023-05-05 | Stop reason: SDUPTHER

## 2023-05-04 RX ORDER — SENNA PLUS 8.6 MG/1
1 TABLET ORAL 2 TIMES DAILY
COMMUNITY

## 2023-05-04 RX ORDER — HYDROMORPHONE HYDROCHLORIDE 1 MG/ML
0.5 INJECTION, SOLUTION INTRAMUSCULAR; INTRAVENOUS; SUBCUTANEOUS ONCE
Status: COMPLETED | OUTPATIENT
Start: 2023-05-04 | End: 2023-05-04

## 2023-05-04 RX ORDER — FLUTICASONE PROPIONATE 50 MCG
1 SPRAY, SUSPENSION (ML) NASAL DAILY
COMMUNITY

## 2023-05-04 RX ORDER — BISACODYL 10 MG
10 SUPPOSITORY, RECTAL RECTAL DAILY PRN
COMMUNITY

## 2023-05-04 RX ORDER — BUPROPION HYDROCHLORIDE 100 MG/1
100 TABLET, EXTENDED RELEASE ORAL 2 TIMES DAILY
COMMUNITY

## 2023-05-04 RX ORDER — POLYETHYLENE GLYCOL 3350 17 G/17G
17 POWDER, FOR SOLUTION ORAL DAILY
COMMUNITY

## 2023-05-04 RX ORDER — ONDANSETRON 8 MG/1
8 TABLET, ORALLY DISINTEGRATING ORAL EVERY 8 HOURS PRN
COMMUNITY

## 2023-05-04 RX ADMIN — HYDROMORPHONE HYDROCHLORIDE 0.5 MG: 1 INJECTION, SOLUTION INTRAMUSCULAR; INTRAVENOUS; SUBCUTANEOUS at 19:01

## 2023-05-04 RX ADMIN — CEFTRIAXONE SODIUM 1000 MG: 1 INJECTION, POWDER, FOR SOLUTION INTRAMUSCULAR; INTRAVENOUS at 20:51

## 2023-05-04 RX ADMIN — SODIUM BICARBONATE 150 MEQ: 84 INJECTION, SOLUTION INTRAVENOUS at 21:18

## 2023-05-04 RX ADMIN — SODIUM BICARBONATE: 84 INJECTION, SOLUTION INTRAVENOUS at 21:31

## 2023-05-04 ASSESSMENT — PAIN - FUNCTIONAL ASSESSMENT: PAIN_FUNCTIONAL_ASSESSMENT: 0-10

## 2023-05-04 ASSESSMENT — PAIN SCALES - GENERAL
PAINLEVEL_OUTOF10: 8
PAINLEVEL_OUTOF10: 8

## 2023-05-04 NOTE — ED NOTES
Pt medicated per eMAR. [facility]       Felicia Baer RN  28/96/63 [id number]

## 2023-05-05 VITALS
BODY MASS INDEX: 27.47 KG/M2 | RESPIRATION RATE: 18 BRPM | DIASTOLIC BLOOD PRESSURE: 72 MMHG | HEIGHT: 67 IN | WEIGHT: 175 LBS | OXYGEN SATURATION: 97 % | SYSTOLIC BLOOD PRESSURE: 116 MMHG | TEMPERATURE: 99.3 F | HEART RATE: 104 BPM

## 2023-05-05 PROBLEM — E87.20 METABOLIC ACIDOSIS: Status: ACTIVE | Noted: 2023-05-05

## 2023-05-05 PROBLEM — E87.1 HYPONATREMIA: Status: ACTIVE | Noted: 2023-05-05

## 2023-05-05 PROBLEM — E87.5 HYPERKALEMIA: Status: ACTIVE | Noted: 2023-05-05

## 2023-05-05 PROBLEM — D64.89 OTHER SPECIFIED ANEMIAS: Status: ACTIVE | Noted: 2023-01-05

## 2023-05-05 PROBLEM — N13.30 BILATERAL HYDRONEPHROSIS: Status: ACTIVE | Noted: 2023-05-05

## 2023-05-05 PROBLEM — C79.9 METASTASIS FROM CERVICAL CANCER (HCC): Status: ACTIVE | Noted: 2022-03-14

## 2023-05-05 PROBLEM — Z51.5 HOSPICE CARE PATIENT: Status: ACTIVE | Noted: 2023-05-05

## 2023-05-05 LAB
ALBUMIN SERPL-MCNC: 2.3 G/DL (ref 3.4–5)
ALBUMIN SERPL-MCNC: 2.3 G/DL (ref 3.4–5)
ANION GAP SERPL CALCULATED.3IONS-SCNC: 24 MMOL/L (ref 3–16)
ANION GAP SERPL CALCULATED.3IONS-SCNC: 24 MMOL/L (ref 3–16)
BACTERIA BLD CULT ORG #2: NORMAL
BACTERIA BLD CULT: NORMAL
BASE EXCESS BLDV CALC-SCNC: -9.7 MMOL/L (ref -3–3)
BASOPHILS # BLD: 0 K/UL (ref 0–0.2)
BASOPHILS NFR BLD: 0.2 %
BLOOD BANK DISPENSE STATUS: NORMAL
BLOOD BANK PRODUCT CODE: NORMAL
BPU ID: NORMAL
BUN SERPL-MCNC: 109 MG/DL (ref 7–20)
BUN SERPL-MCNC: 110 MG/DL (ref 7–20)
CALCIUM SERPL-MCNC: 8.2 MG/DL (ref 8.3–10.6)
CALCIUM SERPL-MCNC: 8.5 MG/DL (ref 8.3–10.6)
CHLORIDE SERPL-SCNC: 88 MMOL/L (ref 99–110)
CHLORIDE SERPL-SCNC: 90 MMOL/L (ref 99–110)
CO2 BLDV-SCNC: 38 MMOL/L
CO2 SERPL-SCNC: 10 MMOL/L (ref 21–32)
CO2 SERPL-SCNC: 14 MMOL/L (ref 21–32)
COHGB MFR BLDV: 7.2 % (ref 0–1.5)
CREAT SERPL-MCNC: 12.8 MG/DL (ref 0.6–1.1)
CREAT SERPL-MCNC: 13.5 MG/DL (ref 0.6–1.1)
DEPRECATED RDW RBC AUTO: 14.6 % (ref 12.4–15.4)
DESCRIPTION BLOOD BANK: NORMAL
EKG ATRIAL RATE: 105 BPM
EKG DIAGNOSIS: NORMAL
EKG P AXIS: 57 DEGREES
EKG P-R INTERVAL: 152 MS
EKG Q-T INTERVAL: 338 MS
EKG QRS DURATION: 92 MS
EKG QTC CALCULATION (BAZETT): 446 MS
EKG R AXIS: 49 DEGREES
EKG T AXIS: 40 DEGREES
EKG VENTRICULAR RATE: 105 BPM
EOSINOPHIL # BLD: 0.4 K/UL (ref 0–0.6)
EOSINOPHIL NFR BLD: 3.5 %
GFR SERPLBLD CREATININE-BSD FMLA CKD-EPI: 3 ML/MIN/{1.73_M2}
GFR SERPLBLD CREATININE-BSD FMLA CKD-EPI: 4 ML/MIN/{1.73_M2}
GLUCOSE SERPL-MCNC: 106 MG/DL (ref 70–99)
GLUCOSE SERPL-MCNC: 99 MG/DL (ref 70–99)
HCO3 BLDV-SCNC: 16.1 MMOL/L (ref 23–29)
HCT VFR BLD AUTO: 19 % (ref 36–48)
HCT VFR BLD AUTO: 19.4 % (ref 36–48)
HGB BLD-MCNC: 6.3 G/DL (ref 12–16)
HGB BLD-MCNC: 6.6 G/DL (ref 12–16)
LYMPHOCYTES # BLD: 0.7 K/UL (ref 1–5.1)
LYMPHOCYTES NFR BLD: 5.8 %
MAGNESIUM SERPL-MCNC: 2.7 MG/DL (ref 1.8–2.4)
MCH RBC QN AUTO: 28.5 PG (ref 26–34)
MCHC RBC AUTO-ENTMCNC: 32.9 G/DL (ref 31–36)
MCV RBC AUTO: 86.6 FL (ref 80–100)
METHGB MFR BLDV: 0.4 %
MONOCYTES # BLD: 1.1 K/UL (ref 0–1.3)
MONOCYTES NFR BLD: 8.3 %
NEUTROPHILS # BLD: 10.6 K/UL (ref 1.7–7.7)
NEUTROPHILS NFR BLD: 82.2 %
O2 CT VFR BLDV CALC: 9 VOL %
O2 THERAPY: ABNORMAL
PCO2 BLDV: 34 MMHG (ref 40–50)
PH BLDV: 7.28 [PH] (ref 7.35–7.45)
PHOSPHATE SERPL-MCNC: 11.8 MG/DL (ref 2.5–4.9)
PHOSPHATE SERPL-MCNC: 12.2 MG/DL (ref 2.5–4.9)
PLATELET # BLD AUTO: 330 K/UL (ref 135–450)
PMV BLD AUTO: 7.7 FL (ref 5–10.5)
PO2 BLDV: 176 MMHG (ref 25–40)
POTASSIUM SERPL-SCNC: 4.6 MMOL/L (ref 3.5–5.1)
POTASSIUM SERPL-SCNC: 5.1 MMOL/L (ref 3.5–5.1)
RBC # BLD AUTO: 2.2 M/UL (ref 4–5.2)
SAO2 % BLDV: 100 %
SODIUM SERPL-SCNC: 124 MMOL/L (ref 136–145)
SODIUM SERPL-SCNC: 126 MMOL/L (ref 136–145)
WBC # BLD AUTO: 12.9 K/UL (ref 4–11)

## 2023-05-05 PROCEDURE — 6360000002 HC RX W HCPCS: Performed by: OBSTETRICS & GYNECOLOGY

## 2023-05-05 PROCEDURE — 2580000003 HC RX 258: Performed by: INTERNAL MEDICINE

## 2023-05-05 PROCEDURE — 6370000000 HC RX 637 (ALT 250 FOR IP): Performed by: INTERNAL MEDICINE

## 2023-05-05 PROCEDURE — 36415 COLL VENOUS BLD VENIPUNCTURE: CPT

## 2023-05-05 PROCEDURE — 94761 N-INVAS EAR/PLS OXIMETRY MLT: CPT

## 2023-05-05 PROCEDURE — 94640 AIRWAY INHALATION TREATMENT: CPT

## 2023-05-05 PROCEDURE — 36430 TRANSFUSION BLD/BLD COMPNT: CPT

## 2023-05-05 PROCEDURE — 85018 HEMOGLOBIN: CPT

## 2023-05-05 PROCEDURE — 85025 COMPLETE CBC W/AUTO DIFF WBC: CPT

## 2023-05-05 PROCEDURE — 6370000000 HC RX 637 (ALT 250 FOR IP): Performed by: CLINICAL NURSE SPECIALIST

## 2023-05-05 PROCEDURE — 83735 ASSAY OF MAGNESIUM: CPT

## 2023-05-05 PROCEDURE — 80069 RENAL FUNCTION PANEL: CPT

## 2023-05-05 PROCEDURE — 2500000003 HC RX 250 WO HCPCS: Performed by: INTERNAL MEDICINE

## 2023-05-05 PROCEDURE — 6360000002 HC RX W HCPCS: Performed by: INTERNAL MEDICINE

## 2023-05-05 PROCEDURE — 93010 ELECTROCARDIOGRAM REPORT: CPT | Performed by: INTERNAL MEDICINE

## 2023-05-05 PROCEDURE — 85014 HEMATOCRIT: CPT

## 2023-05-05 PROCEDURE — 82803 BLOOD GASES ANY COMBINATION: CPT

## 2023-05-05 PROCEDURE — 2580000003 HC RX 258

## 2023-05-05 RX ORDER — FENTANYL 50 UG/H
1 PATCH TRANSDERMAL
Qty: 10 PATCH | Refills: 0 | Status: SHIPPED | OUTPATIENT
Start: 2023-05-08 | End: 2023-06-07

## 2023-05-05 RX ORDER — BISACODYL 10 MG
10 SUPPOSITORY, RECTAL RECTAL DAILY PRN
Status: DISCONTINUED | OUTPATIENT
Start: 2023-05-05 | End: 2023-05-05 | Stop reason: HOSPADM

## 2023-05-05 RX ORDER — MORPHINE SULFATE 15 MG/1
30 TABLET ORAL
Status: DISCONTINUED | OUTPATIENT
Start: 2023-05-05 | End: 2023-05-05 | Stop reason: HOSPADM

## 2023-05-05 RX ORDER — HEPARIN SODIUM 5000 [USP'U]/ML
5000 INJECTION, SOLUTION INTRAVENOUS; SUBCUTANEOUS EVERY 8 HOURS SCHEDULED
Status: DISCONTINUED | OUTPATIENT
Start: 2023-05-05 | End: 2023-05-05 | Stop reason: HOSPADM

## 2023-05-05 RX ORDER — PANTOPRAZOLE SODIUM 40 MG/1
40 TABLET, DELAYED RELEASE ORAL
Status: DISCONTINUED | OUTPATIENT
Start: 2023-05-05 | End: 2023-05-05 | Stop reason: HOSPADM

## 2023-05-05 RX ORDER — SODIUM CHLORIDE 0.9 % (FLUSH) 0.9 %
5-40 SYRINGE (ML) INJECTION PRN
Status: DISCONTINUED | OUTPATIENT
Start: 2023-05-05 | End: 2023-05-05 | Stop reason: HOSPADM

## 2023-05-05 RX ORDER — FENTANYL 50 UG/H
1 PATCH TRANSDERMAL
Status: DISCONTINUED | OUTPATIENT
Start: 2023-05-05 | End: 2023-05-05 | Stop reason: HOSPADM

## 2023-05-05 RX ORDER — SODIUM CHLORIDE 0.9 % (FLUSH) 0.9 %
5-40 SYRINGE (ML) INJECTION EVERY 12 HOURS SCHEDULED
Status: DISCONTINUED | OUTPATIENT
Start: 2023-05-05 | End: 2023-05-05 | Stop reason: HOSPADM

## 2023-05-05 RX ORDER — ACETAMINOPHEN 650 MG/1
650 SUPPOSITORY RECTAL EVERY 6 HOURS PRN
Status: DISCONTINUED | OUTPATIENT
Start: 2023-05-05 | End: 2023-05-05 | Stop reason: HOSPADM

## 2023-05-05 RX ORDER — ALBUTEROL SULFATE 90 UG/1
2 AEROSOL, METERED RESPIRATORY (INHALATION) EVERY 6 HOURS PRN
Status: DISCONTINUED | OUTPATIENT
Start: 2023-05-05 | End: 2023-05-05 | Stop reason: HOSPADM

## 2023-05-05 RX ORDER — ACETAMINOPHEN 325 MG/1
650 TABLET ORAL EVERY 6 HOURS PRN
Status: DISCONTINUED | OUTPATIENT
Start: 2023-05-05 | End: 2023-05-05 | Stop reason: HOSPADM

## 2023-05-05 RX ORDER — HYDROMORPHONE HCL 110MG/55ML
2 PATIENT CONTROLLED ANALGESIA SYRINGE INTRAVENOUS
Status: COMPLETED | OUTPATIENT
Start: 2023-05-05 | End: 2023-05-05

## 2023-05-05 RX ORDER — OXYCODONE HYDROCHLORIDE 20 MG/1
20 TABLET ORAL EVERY 4 HOURS PRN
Qty: 30 TABLET | Refills: 0 | Status: SHIPPED | OUTPATIENT
Start: 2023-05-05 | End: 2023-06-04

## 2023-05-05 RX ORDER — LORAZEPAM 1 MG/1
1 TABLET ORAL EVERY 6 HOURS PRN
Status: DISCONTINUED | OUTPATIENT
Start: 2023-05-05 | End: 2023-05-05

## 2023-05-05 RX ORDER — SODIUM CHLORIDE 9 MG/ML
INJECTION, SOLUTION INTRAVENOUS PRN
Status: DISCONTINUED | OUTPATIENT
Start: 2023-05-05 | End: 2023-05-05 | Stop reason: HOSPADM

## 2023-05-05 RX ORDER — LORAZEPAM 2 MG/ML
1 CONCENTRATE ORAL EVERY 4 HOURS PRN
Qty: 30 ML | Refills: 0 | Status: SHIPPED | OUTPATIENT
Start: 2023-05-05 | End: 2023-05-19

## 2023-05-05 RX ORDER — POLYETHYLENE GLYCOL 3350 17 G/17G
17 POWDER, FOR SOLUTION ORAL DAILY PRN
Status: DISCONTINUED | OUTPATIENT
Start: 2023-05-05 | End: 2023-05-05 | Stop reason: HOSPADM

## 2023-05-05 RX ORDER — FLUTICASONE PROPIONATE 50 MCG
1 SPRAY, SUSPENSION (ML) NASAL DAILY
Status: DISCONTINUED | OUTPATIENT
Start: 2023-05-05 | End: 2023-05-05 | Stop reason: HOSPADM

## 2023-05-05 RX ORDER — ONDANSETRON 4 MG/1
4 TABLET, ORALLY DISINTEGRATING ORAL EVERY 8 HOURS PRN
Status: DISCONTINUED | OUTPATIENT
Start: 2023-05-05 | End: 2023-05-05 | Stop reason: HOSPADM

## 2023-05-05 RX ORDER — LORAZEPAM 2 MG/ML
1 CONCENTRATE ORAL EVERY 4 HOURS PRN
Status: DISCONTINUED | OUTPATIENT
Start: 2023-05-05 | End: 2023-05-05 | Stop reason: HOSPADM

## 2023-05-05 RX ORDER — ONDANSETRON 2 MG/ML
4 INJECTION INTRAMUSCULAR; INTRAVENOUS EVERY 6 HOURS PRN
Status: DISCONTINUED | OUTPATIENT
Start: 2023-05-05 | End: 2023-05-05 | Stop reason: HOSPADM

## 2023-05-05 RX ORDER — GABAPENTIN 100 MG/1
100 CAPSULE ORAL 3 TIMES DAILY
Qty: 60 CAPSULE | Refills: 0 | Status: SHIPPED | OUTPATIENT
Start: 2023-05-05 | End: 2023-05-25

## 2023-05-05 RX ORDER — SODIUM CHLORIDE 9 MG/ML
INJECTION, SOLUTION INTRAVENOUS
Status: COMPLETED
Start: 2023-05-05 | End: 2023-05-05

## 2023-05-05 RX ORDER — PROMETHAZINE HYDROCHLORIDE 25 MG/ML
12.5 INJECTION, SOLUTION INTRAMUSCULAR; INTRAVENOUS
Status: COMPLETED | OUTPATIENT
Start: 2023-05-05 | End: 2023-05-05

## 2023-05-05 RX ORDER — TAMSULOSIN HYDROCHLORIDE 0.4 MG/1
0.4 CAPSULE ORAL DAILY
Status: DISCONTINUED | OUTPATIENT
Start: 2023-05-05 | End: 2023-05-05

## 2023-05-05 RX ORDER — SENNA PLUS 8.6 MG/1
1 TABLET ORAL 2 TIMES DAILY
Status: DISCONTINUED | OUTPATIENT
Start: 2023-05-05 | End: 2023-05-05 | Stop reason: HOSPADM

## 2023-05-05 RX ADMIN — ONDANSETRON 4 MG: 2 INJECTION INTRAMUSCULAR; INTRAVENOUS at 06:03

## 2023-05-05 RX ADMIN — SODIUM CHLORIDE: 9 INJECTION, SOLUTION INTRAVENOUS at 05:00

## 2023-05-05 RX ADMIN — Medication 2 PUFF: at 08:27

## 2023-05-05 RX ADMIN — SODIUM BICARBONATE 150 MEQ: 84 INJECTION, SOLUTION INTRAVENOUS at 05:52

## 2023-05-05 RX ADMIN — FLUTICASONE PROPIONATE 1 SPRAY: 50 SPRAY, METERED NASAL at 09:03

## 2023-05-05 RX ADMIN — HYDROMORPHONE HYDROCHLORIDE 2 MG: 2 INJECTION, SOLUTION INTRAMUSCULAR; INTRAVENOUS; SUBCUTANEOUS at 11:46

## 2023-05-05 RX ADMIN — PROMETHAZINE HYDROCHLORIDE 12.5 MG: 25 INJECTION INTRAMUSCULAR; INTRAVENOUS at 12:31

## 2023-05-05 RX ADMIN — ACETAMINOPHEN 650 MG: 325 TABLET ORAL at 08:58

## 2023-05-05 RX ADMIN — SODIUM CHLORIDE, PRESERVATIVE FREE 10 ML: 5 INJECTION INTRAVENOUS at 09:12

## 2023-05-05 ASSESSMENT — PAIN DESCRIPTION - DESCRIPTORS
DESCRIPTORS: ACHING

## 2023-05-05 ASSESSMENT — PAIN DESCRIPTION - LOCATION
LOCATION: ABDOMEN;BACK
LOCATION: ABDOMEN;BACK
LOCATION: BACK;HIP
LOCATION: BACK;HIP;ABDOMEN

## 2023-05-05 ASSESSMENT — PAIN SCALES - GENERAL
PAINLEVEL_OUTOF10: 10
PAINLEVEL_OUTOF10: 8
PAINLEVEL_OUTOF10: 8
PAINLEVEL_OUTOF10: 10

## 2023-05-05 ASSESSMENT — PAIN DESCRIPTION - ORIENTATION
ORIENTATION: RIGHT;LEFT
ORIENTATION: RIGHT;LEFT

## 2023-05-05 ASSESSMENT — ENCOUNTER SYMPTOMS
BACK PAIN: 1
SHORTNESS OF BREATH: 1

## 2023-05-05 NOTE — DISCHARGE SUMMARY
of posterosuperior wall of the bladder with the cervical mass. Multiple low-attenuation lesions in the liver measuring up to 2.8 cm, suspicious for liver metastasis. Moderate free pelvic fluid. XR CHEST PORTABLE    Result Date: 5/4/2023  EXAMINATION: ONE XRAY VIEW OF THE CHEST 5/4/2023 9:18 pm COMPARISON: 03/17/2022 HISTORY: ORDERING SYSTEM PROVIDED HISTORY: sirs TECHNOLOGIST PROVIDED HISTORY: Reason for exam:->sirs Reason for Exam: SIRS FINDINGS: No lung infiltrate or consolidation. No pneumothorax or pleural effusion. Heart size is normal.     No acute abnormality. CBC:   Recent Labs     05/04/23 1825 05/05/23 0203 05/05/23 0758   WBC 16.1* 12.9*  --    HGB 6.8* 6.3* 6.6*    330  --      BMP:    Recent Labs     05/04/23 1825 05/05/23 0203 05/05/23  0758   * 124* 126*   K 5.6* 5.1 4.6   CL 86* 90* 88*   CO2 9* 10* 14*   * 110* 109*   CREATININE 13.4* 12.8* 13.5*   GLUCOSE 114* 99 106*     Hepatic:   Recent Labs     05/04/23 1825   AST 43*   ALT 10   BILITOT <0.2   ALKPHOS 97     Lipids: No results found for: CHOL, HDL, TRIG  Hemoglobin A1C: No results found for: LABA1C  TSH: No results found for: TSH  Troponin: No results found for: TROPONINT  Lactic Acid: No results for input(s): LACTA in the last 72 hours. BNP: No results for input(s): PROBNP in the last 72 hours.   UA:  Lab Results   Component Value Date/Time    NITRU Negative 01/05/2023 01:20 PM    COLORU DARK YELLOW 01/05/2023 01:20 PM    PHUR 6.5 01/05/2023 01:20 PM    WBCUA 71 01/05/2023 01:20 PM    RBCUA 1356 01/05/2023 01:20 PM    BACTERIA 1+ 01/05/2023 01:20 PM    CLARITYU TURBID 01/05/2023 01:20 PM    SPECGRAV 1.015 01/05/2023 01:20 PM    LEUKOCYTESUR LARGE 01/05/2023 01:20 PM    UROBILINOGEN 1.0 01/05/2023 01:20 PM    BILIRUBINUR SMALL 01/05/2023 01:20 PM    BLOODU LARGE 01/05/2023 01:20 PM    GLUCOSEU Negative 01/05/2023 01:20 PM    KETUA Negative 01/05/2023 01:20 PM     Urine Cultures:   Lab Results   Component

## 2023-05-05 NOTE — PROGRESS NOTES
Yao. 51  Conversation with family regarding medications. Family reports patient is on oxycodone 20 mg q4h PRN for pain at home. Patient currently has a fentanyl patch 50 mcg on and family reports she was on this at home as well. Also requested for hospitalist to prescribe ativan PRN as well, which she states she will do.     [address] Guerrero  [phone] (work cell)  [phone] (main & referrals)

## 2023-05-05 NOTE — PROGRESS NOTES
Patient arrived to unit with mom from ED. Patient weak and slightly lethargic. Mother and patient answered all admission questions. Patient with PRBC infusing upon arrival. No adverse reactions noted. Colostomy bag changed and patient made comfortable in room. All questions answered and patient with no c/o at this time.

## 2023-05-05 NOTE — PROGRESS NOTES
4 Eyes Skin Assessment     NAME:  Claudia Lopez  YOB: 1990  MEDICAL RECORD NUMBER:  [id number]    The patient is being assessed for  Admission    I agree that at least one RN has performed a thorough Head to Toe Skin Assessment on the patient. ALL assessment sites listed below have been assessed. Areas assessed by both nurses:    Head, Face, Ears, Shoulders, Back, Chest, Arms, Elbows, Hands, Sacrum. Buttock, Coccyx, Ischium, and Legs. Feet and Heels        Does the Patient have a Wound? Yes wound(s) were present on assessment.  LDA wound assessment was Initiated and completed by RN       Fredrick Prevention initiated by RN: Yes  Wound Care Orders initiated by RN: No    Pressure Injury (Stage 3,4, Unstageable, DTI, NWPT, and Complex wounds) if present, place Wound referral order by RN under : Yes    New Ostomies, if present place, Ostomy referral order under : No     Nurse 1 eSignature: Electronically signed by Faye Banegas RN on 5/5/23 at 1:51 AM EDT    **SHARE this note so that the co-signing nurse can place an eSignature**    Nurse 2 eSignature: Electronically signed by Renea Mendoza RN on 5/5/23 at 1:56 AM EDT

## 2023-05-05 NOTE — PROGRESS NOTES
CLINICAL PHARMACY NOTE: MEDS TO BEDS    Total # of Prescriptions Filled: 4   The following medications were delivered to the patient:  Ativan  Oxycodone 20 mg  Fentanyl 50mcg (dose decrease)  Gabapentin 100 mg      Additional Documentation:  Delivered to patient=signed  Ok to be delivered per Kingston Hendricks CP

## 2023-05-05 NOTE — PROGRESS NOTES
[facility]  Stopped by patient's room. Family reports patient's  is on the way to the hospital. Agreed to meet with me at 36 today.      [address] Guerrero  [phone] (work cell)  [phone] (main & referrals)

## 2023-05-05 NOTE — DISCHARGE INSTR - COC
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    BOBBY (acute kidney injury) ([facility] [address] N17.9       Isolation/Infection:   Isolation            No Isolation          Patient Infection Status       Infection Onset Added Last Indicated Last Indicated By Review Planned Expiration Resolved Resolved By    None active    Resolved    COVID-19 (Rule Out) 03/10/22 03/10/22 03/10/22 COVID-19 (Ordered)   03/11/22 Rule-Out Test Resulted            Nurse Assessment:  Last Vital Signs: /72   Pulse (!) 104   Temp 99.3 °F (37.4 °C) (Oral)   Resp 18   Ht 5' 7\" (1.702 m)   Wt 175 lb (79.4 kg)   LMP 03/04/2022 (Approximate)   SpO2 97%   BMI 27.41 kg/m²     Last documented pain score (0-10 scale): Pain Level: 10  Last Weight:   Wt Readings from Last 1 Encounters:   05/04/23 175 lb (79.4 kg)     Mental Status:  {IP PT MENTAL STATUS:20030}    IV Access:  { BEN IV ACCESS:675144003}    Nursing Mobility/ADLs:  Walking   {CHP DME XCHV:944674752}  Transfer  {CHP DME COIZ:733507196}  Bathing  {CHP DME MFQE:874690027}  Dressing  {CHP DME GQDU:637595396}  Toileting  {CHP DME TWAR:322246805}  Feeding  {CHP DME ZXXA:003337004}  Med Admin  {CHP DME CUGS:187595472}  Med Delivery   { BEN MED Delivery:127081495}    Wound Care Documentation and Therapy:  Wound 05/05/23 Breast Lateral;Left;Medial abrasion (Active)   Wound Etiology Other 05/05/23 0133   Wound Assessment Pink/red 05/05/23 0133   Drainage Amount None 05/05/23 0133   Odor None 05/05/23 0133   Number of days: 0       Wound Ankle Anterior;Right (Active)   Wound Etiology Pressure Stage 2 05/05/23 0133   Wound Assessment Denuded

## 2023-05-05 NOTE — PROGRESS NOTES
[facility]  Patient/family signed consents for hospice care. Hospice will be following patient at home. Family reports they plan on taking home via private car. DME will be delivered but none is important to the timing of the patient's discharge, so from our perspective she is free to leave the hospital when the hospital is ready to discharge her. Have initiated hospice continuous care for this patient in her home setting.      [address] Gabby Guerrero  [phone] (work cell)  [phone] (main & referrals)

## 2023-05-05 NOTE — CONSULTS
PALLIATIVE MEDICINE CONSULTATION     Patient name:Connie Mireles   FDE:[id number]    :1990  Room/Bed:N-5577/5577-01   LOS: 1 day         Date of consult:2023    Consult Information  Palliative Medicine Consult performed by: TAYE Lazaro CNP, CNP    Inpatient consult to Palliative Care  Consult performed by: TAYE Lazaro CNP  Consult ordered by: Greer Mc MD  Reason for consult: [address] and code status             ASSESSMENT/RECOMMENDATIONS     34 y.o. female with back pain and AMS from BOBBY d/t metastatic cervical cancer        Symptom Management:  Back pain- Pt states that Fentanyl patch was helpful to her at home ordered fantanyl patch and Morphine PO PRN  BOBBY- pt Cr 13.5 pt with tremors and confusion related to her rising Cr  Goals of Care- talked to pt and mother at bedside in depth regarding what her goal is pt is clear that she wants symptom management only no additional Tx, tests or procedures [facility] at her request and made referral to Hospice she was active with [facility] in January Specifics of an end-of-life/comfort-focused treatment plan were discussed with the patient and family, including (but not limited to) discontinuation of labs, non-palliative medications, and routine vitals, along with the benefits of hospice enrollment. Pt and mom asked multiple appropriate questions, all of which were addressed. Pt wants to discuss with her significant other about transitioning to intensive comfort treatment.        Patient/Family Goals of Care :    talked to pt and mother at bedside in depth regarding what her goal is pt is clear that she wants symptom management only no additional Tx, tests or procedures [facility] at her request and made referral to Hospice she was active with [facility] in January Specifics of an end-of-life/comfort-focused treatment plan were discussed with the patient and family, including (but not limited to) discontinuation of labs, non-palliative
Urology Consult Note  [facility]     Patient: Carri Oquendo MRN: [id number]  Room[facility]Bed: [facility]7351/6500-10   YOB: 1990  Age/Sex: 34 y. o.female  Admission Date: 5/4/2023     Date of Service:  5/5/2023    Consulting Provider: TAYE Ramsey CNP  Admitting/Requesting Physician: Cooper Mason MD  Primary Care Physician: Alisha Fields MD    Reason for Consult: Right Hydronephrosis    ASSESSMENT/PLAN     35 yo female with hx of advanced cervical cancer, metastasis to left ovary which is causing left hydronephrosis s/p cysto attempted left stent insertion but couldn't visualize the UO. Now s/p left antegrade stent through nephrostomy access per IR 01/03/2023. Now CT a/p 05/04 shows bilateral hydronephrosis with existing left stent in place and possible invasion of cervical mass into the posterior bladder. Cr is 12.8 from 0.9 in January 2023. She was previously in hospice but this has since been revoked. Recommendations:  Advancing cancer now with likely invasion into the posterior bladder, new right sided hydronephrosis, severe BOBBY likely 2/2 to obstructive uropathy. She will need bilateral nephrostomy tube insertion pending goals of care. Discussed with mother Dhara Eldridge this morning, the patient has no POA and acutely altered today on exam. She is unsure of how she would like to proceed. All the patients questions were answered. She understands the plan as listed above. HISTORY     Chief Complaint:   Chief Complaint   Patient presents with    Flank Pain     Pt to ED with extensive hx of cervical cancer with fistula, had colostomy recently at [facility]. Told she has kidney failure. Hx of stent in L ureter, stent replacement on 05/15. Pt having n/v, decreased UO. History of Present Illness: Carri Oquendo is a 34 y.o. female with advanced cervical cancer and bilateral hydronephrosis. Onset of symptoms was several days ago with worsening course since that time.  Symptoms
reconstruction, and/or weight based adjustment of the mA/kV was utilized to reduce the radiation dose to as low as reasonably achievable. COMPARISON: CT chest abdomen pelvis March 30, 2023 HISTORY: ORDERING SYSTEM PROVIDED HISTORY: acute renal failure, flank pain, hx L ureteral stent, cervical cancer TECHNOLOGIST PROVIDED HISTORY: Reason for exam:->acute renal failure, flank pain, hx L ureteral stent, cervical cancer Additional Contrast?->None Decision Support Exception - unselect if not a suspected or confirmed emergency medical condition->Emergency Medical Condition (MA) Is the patient pregnant?->No Reason for Exam: acute renal failure, flank pain, hx L ureteral stent, cervical cancer. Flank Pain (Pt to ED with extensive hx of cervical cancer with fistula, had colostomy recently at [facility]. Told she has kidney failure. Hx of stent in L ureter, stent replacement on 05/15. Pt having n/v, decreased UO. ). FINDINGS: Lower Chest: The lung bases are clear. The heart is of normal size. No pleural or pericardial effusion. Organs: Limited evaluation of the solid organs without IV contrast.  There are multiple low-attenuation lesions in the liver measuring up to 2.8 cm, suspicious for liver metastasis. The gallbladder, pancreas, spleen, and bilateral adrenal glands are within normal limits. There is moderate right hydronephrosis/hydroureter, new since the prior study. There is a 5.5 mm calculus versus surgical clip in the region of the right ureteral vesicular junction (series 4, image 137), stable. There is moderate left hydronephrosis with a left-sided double pigtail ureteral catheter in expected position. There is a 3 mm calculus at the left ureteral vesicular junction abutting the distal left ureteral catheter (series 4, image 135). GI/Bowel: There is a left abdominal colostomy in place.   There is infiltrative wall thickening in the distal sigmoid colon (series 4, image 132), may be related to infection/inflammation or

## 2023-05-05 NOTE — H&P
Hospital Medicine  History and Physical    Patient:  Chapin Randall  MRN: [id number]    CHIEF COMPLAINT:    Patient presents with    Flank Pain       Pt to ED with extensive hx of cervical cancer with fistula, had colostomy recently at [facility]. Told she has kidney failure. Hx of stent in L ureter, stent replacement on 05/15. Pt having n/v, decreased UO. PCP: Teodoro Skiff, MD    HISTORY OF PRESENT ILLNESS:   The patient is a 34 y.o. female who presents to the emergency department after being told she has acute renal failure on laboratory studies drawn April 29. Patient has a history of cervical cancer not currently on chemo or radiation therapy. She has chronic vaginal bleeding and pain around the site of her ostomy. She is endorsing some left flank pain today as well. She feels lightheaded and weak. She does endorse decreased urine output. She wishes for palliative and pain control. She is okay with dialysis but does not wish for any aggressive measures including chest compressions or intubation. In ED work-up is showing acidosis, hyperkalemia in the setting of acute renal failure. Discussed with Dr. Elroy Hernandez with nephrology who is recommending bicarb bolus and drip and treatment of anemia. Since patient's potassium is currently stable he does not believe patient needs emergent dialysis tonight. Discussed plan of care with patient and mother. They are agreeable. Her GCS remains 15. She has consented to blood transfusion and further inpatient treatments for initiation of dialysis. Code status today is DNR Comfort Care arrest.     Of note she does have leukocytosis. She was given rocephin for presumed UTI. IV fluids avoided in the setting of acute renal failure to avoid volume overload in this critically ill patient. R sided hydronephrosis new today on CT abd.  there is intra peritoneal free air likely due to loop colostomy 1 week ago. Consulted urology for acute R hydro as well.

## 2023-05-05 NOTE — PROGRESS NOTES
Pharmacy Home Medication Reconciliation Note    A medication reconciliation has been completed for Bill Wakefield 1990    Pharmacy: [address], [address] [place] [address] provided by: patient's mother    The patient's home medication list is as follows: No current facility-administered medications on file prior to encounter. Current Outpatient Medications on File Prior to Encounter   Medication Sig Dispense Refill    mometasone-formoterol (DULERA) 200-5 MCG/ACT inhaler Inhale 2 puffs into the lungs in the morning and 2 puffs in the evening. bisacodyl (DULCOLAX) 10 MG suppository Place 1 suppository rectally daily as needed for Constipation      buPROPion (WELLBUTRIN SR) 100 MG extended release tablet Take 1 tablet by mouth 2 times daily      cyclobenzaprine (FLEXERIL) 5 MG tablet Take 1 tablet by mouth 3 times daily as needed for Muscle spasms      fentaNYL (DURAGESIC) 75 MCG/HR Place 1 patch onto the skin every 72 hours. Max Daily Amount: 1 patch      fluticasone (FLONASE) 50 MCG/ACT nasal spray 1 spray by Each Nostril route daily      LORazepam (ATIVAN) 1 MG tablet Take 1 tablet by mouth every 6 hours as needed for Anxiety. Max Daily Amount: 4 mg      ondansetron (ZOFRAN-ODT) 8 MG TBDP disintegrating tablet Place 1 tablet under the tongue every 8 hours as needed for Nausea or Vomiting      promethazine (PHENERGAN) 12.5 MG tablet Take 1 tablet by mouth every 6 hours as needed for Nausea      oxyCODONE (ROXICODONE) 20 MG immediate release tablet Take 1 tablet by mouth every 4 hours as needed for Pain. Max Daily Amount: 120 mg      phenazopyridine (PYRIDIUM) 200 MG tablet Take 1 tablet by mouth 3 times daily as needed for Pain      polyethylene glycol (GLYCOLAX) 17 GM/SCOOP powder Take 17 g by mouth daily      traMADol (ULTRAM) 50 MG tablet Take 1 tablet by mouth every 6 hours as needed for Pain.  Max Daily Amount: 200 mg      prochlorperazine (COMPAZINE) 10 MG tablet Take 1 tablet by

## 2023-05-05 NOTE — PROGRESS NOTES
Nephrology initial plan:     Consult received. Discussed w/ ER team Dr. Earnest King  Chart reviewed briefly full consult note to follow. Check blood gas, LA   Give 1U PRBC. Give 3 amp of bicarb push then 150 mEq at 100cc/hr. Rule out obstruction. W/ CT w/o IVC    Recheck Renal panel in 2 hours. Strict I/Os. If bicarb better then might not need dialysis tonight. Thank you for allowing u s to participate in this patients care. Please do not hesitate to contact me, if any questions/concerns. We will follow along with you. Nino Booker MD  [facility] [address] [place] [address]. [phone] or [address].

## 2023-05-05 NOTE — RT PROTOCOL NOTE
RT Inhaler-Nebulizer Bronchodilator Protocol Note    There is a bronchodilator order in the chart from a provider indicating to follow the RT Bronchodilator Protocol and there is an Initiate RT Inhaler-Nebulizer Bronchodilator Protocol order as well (see protocol at bottom of note). CXR Findings:  XR CHEST PORTABLE    Result Date: 5/4/2023  No acute abnormality. The findings from the last RT Protocol Assessment were as follows:   History Pulmonary Disease: None or smoker <15 pack years  Respiratory Pattern: Dyspnea on exertion or RR 21-25 bpm  Breath Sounds: Clear breath sounds  Cough: Strong, spontaneous, non-productive  Indication for Bronchodilator Therapy:    Bronchodilator Assessment Score: 2    Aerosolized bronchodilator medication orders have been revised according to the RT Inhaler-Nebulizer Bronchodilator Protocol below. Respiratory Therapist to perform RT Therapy Protocol Assessment initially then follow the protocol. Repeat RT Therapy Protocol Assessment PRN for score 0-3 or on second treatment, BID, and PRN for scores above 3. No Indications - adjust the frequency to every 6 hours PRN wheezing or bronchospasm, if no treatments needed after 48 hours then discontinue using Per Protocol order mode. If indication present, adjust the RT bronchodilator orders based on the Bronchodilator Assessment Score as indicated below. Use Inhaler orders unless patient has one or more of the following: on home nebulizer, not able to hold breath for 10 seconds, is not alert and oriented, cannot activate and use MDI correctly, or respiratory rate 25 breaths per minute or more, then use the equivalent nebulizer order(s) with same Frequency and PRN reasons based on the score. If a patient is on this medication at home then do not decrease Frequency below that used at home.     0-3 - enter or revise RT bronchodilator order(s) to equivalent RT Bronchodilator order with Frequency of every 4 hours PRN for

## 2023-05-05 NOTE — ED PROVIDER NOTES
[facility]      Pt Name: Francie Elliott  MRN: [id number]  [place] 1990  Date of evaluation: 5/4/2023  Provider: Lan Munson MD    CHIEF COMPLAINT       Chief Complaint   Patient presents with    Flank Pain     Pt to ED with extensive hx of cervical cancer with fistula, had colostomy recently at [facility]. Told she has kidney failure. Hx of stent in L ureter, stent replacement on 05/15. Pt having n/v, decreased UO. HISTORY OF PRESENT ILLNESS   (Location/Symptom, Timing/Onset, Context/Setting, Quality, Duration, Modifying Factors, Severity)  Note limiting factors. Francie Elliott is a 34 y.o. female who presents to the emergency department after being told she has acute renal failure on laboratory studies drawn April 29. Patient has a history of cervical cancer not currently on chemo or radiation therapy. She has chronic vaginal bleeding and pain around the site of her ostomy. She is endorsing some left flank pain today as well. She feels lightheaded and weak. She does endorse decreased urine output. She wishes for palliative and pain control. She is okay with dialysis but does not wish for any aggressive measures including chest compressions or intubation. Nursing Notes were reviewed. REVIEW OF SYSTEMS    (2-9 systems for level 4, 10 or more for level 5)     Review of Systems  Weakness, fatigue, flank pain  Except as noted above the remainder of the review of systems was reviewed and negative.        PAST MEDICAL HISTORY     Past Medical History:   Diagnosis Date    ADHD (attention deficit hyperactivity disorder)     Asthma     Bipolar 1 disorder ([facility] [address]     Bipolar disorder ([facility] [address]     COVID-19 2020    Depression     Irregular periods     PONV (postoperative nausea and vomiting)          SURGICAL HISTORY       Past Surgical History:   Procedure Laterality Date    CERVIX BIOPSY N/A 10/28/2021    CERVICAL BIOPSIES,  LOOP EXCISIONAL

## 2023-05-05 NOTE — PROGRESS NOTES
Blood transfusion complete. No adverse effects noted. MD notified of critical lab values. Medications given per order. Patient in bed with eyes closed but will respond to voice. Call light within reach and patient with no needs at this time.

## 2023-05-05 NOTE — PROGRESS NOTES
Patient had family in room since around 1000 today. Patient decided to continue to refuse treatment. Patient did not really urinate all day. Purewick mostly collected blood. Removed peripheral IV's from right and left Ac. Catheters intact, no complications, dressing applied. This nurse helped mother dress patient. Upon removing purewick, there were several big clots protruding from vaginial area. This nurse helped patient change into clean brief with new sanitary pad and then finish dressing. Discharge instructions were then reviewed with patient and her mother.  was also nearby. Patient and mother verbalized understanding. Patient was discharged home with the mother and [facility] will take over from here.

## 2023-05-05 NOTE — PROGRESS NOTES
Physical & Occupational Therapy  Patient electing hospice care per RN. Will sign off.     Cornelius Burk PT, DPT, ATC-R [id number]  Ghada Muhammad, OTR/L [id number]

## 2023-05-08 LAB
BACTERIA BLD CULT ORG #2: NORMAL
BACTERIA BLD CULT: NORMAL

## (undated) DEVICE — SUTURE VCRL PLUS SZ 0 54IN TIE VCP608H

## (undated) DEVICE — ELECTRODE LOOP 10X10MM SHFT L5IN DIA3/32IN STD LEEP LLETZ

## (undated) DEVICE — BLADE ES L6IN ELASTOMERIC COAT INSUL DURABLE BEND UPTO

## (undated) DEVICE — GLOVE,SURG,SENSICARE SLT,LF,PF,6: Brand: MEDLINE

## (undated) DEVICE — GLOVE SURG SZ 65 THK91MIL LTX FREE SYN POLYISOPRENE

## (undated) DEVICE — SHEET,DRAPE,53X77,STERILE: Brand: MEDLINE

## (undated) DEVICE — SOLUTION IRRIG 1000ML 0.9% SOD CHL USP POUR PLAS BTL

## (undated) DEVICE — SOLUTION PREP POVIDONE IOD FOR SKIN MUCOUS MEM PRIOR TO

## (undated) DEVICE — NEEDLE SPNL 20GA L3.5IN YEL HUB S STL REG WALL FIT STYL W/

## (undated) DEVICE — CATHETER,URETHRAL,REDRUBBER,STRL,16FR: Brand: MEDLINE

## (undated) DEVICE — SPONGE LAP W18XL18IN WHT COT 4 PLY FLD STRUNG RADPQ DISP ST

## (undated) DEVICE — CYSTO/BLADDER IRRIGATION SET, REGULATING CLAMP

## (undated) DEVICE — ELECTRODE BALL DIA5MM TUNGSTEN LLETZ DURABLE RESIST

## (undated) DEVICE — SUTURE PDS + SZ 1 L96IN ABSRB VLT L65MM TP-1 1/2 CIR PDP880G

## (undated) DEVICE — SPONGE,PEANUT,XRAY,ST,SM,3/8",5/CARD: Brand: MEDLINE INDUSTRIES, INC.

## (undated) DEVICE — SYRINGE MED 10ML SLIP TIP BLNT FILL AND LUERLOCK DISP

## (undated) DEVICE — DEVICE SECUREMENT AD W/ TRICOT ANCHR PD FOR F LTX SIL CATH

## (undated) DEVICE — VESSEL LOOPS,MAXI, BLUE: Brand: DEVON

## (undated) DEVICE — Device: Brand: MEDEX

## (undated) DEVICE — GLOVE ORANGE PI 7 1/2   MSG9075

## (undated) DEVICE — SUTURE VCRL  SZ 3-0 L27IN ABSRB UD RB-1 1/2 CIR TAPR PNT VCP215H

## (undated) DEVICE — MERCY FAIRFIELD TURNOVER KIT: Brand: MEDLINE INDUSTRIES, INC.

## (undated) DEVICE — YANKAUER,BULB TIP,W/O VENT,RIGID,STERILE: Brand: MEDLINE

## (undated) DEVICE — SOLUTION IRRIG 3000ML 0.9% SOD CHL USP UROMATIC PLAS CONT

## (undated) DEVICE — TUBING, SUCTION, 1/4" X 12', STRAIGHT: Brand: MEDLINE

## (undated) DEVICE — SUTURE VCRL + SZ 0 L18IN ABSRB CLR VCP112G

## (undated) DEVICE — SUTURE VCRL SZ 0 L36IN ABSRB UD CT-1 L36MM 1/2 CIR TAPR PNT VCP946H

## (undated) DEVICE — GOWN,AURORA,NONREINF,RAGLAN,XXL,STERILE: Brand: MEDLINE

## (undated) DEVICE — SYRINGE, LUER LOCK, 10ML: Brand: MEDLINE

## (undated) DEVICE — TRAY PREP DRY W/ PREM GLV 2 APPL 6 SPNG 2 UNDPD 1 OVERWRAP

## (undated) DEVICE — TELFA ADHESIVE ISLAND DRESSING: Brand: TELFA

## (undated) DEVICE — TIP YANK W/O VENT

## (undated) DEVICE — SUTURE MCRYL + SZ 4-0 L27IN ABSRB UD L19MM PS-2 3/8 CIR MCP426H

## (undated) DEVICE — STRAP RESTRN W3.5XL18IN STD ALL PURP DISP W/ SLNG RNG

## (undated) DEVICE — Z DISCONTINUED BY MEDLINE USE 2711682 TRAY SKIN PREP DRY W/ PREM GLV

## (undated) DEVICE — ELECTRODE PT RET AD L9FT HI MOIST COND ADH HYDRGEL CORDED

## (undated) DEVICE — SUTURE VCRL SZ 0 L54IN ABSRB UD POLYGLACTIN 910 COAT BRAID J608H

## (undated) DEVICE — SUTURE VCRL + SZ 3-0 L36IN ABSRB UD L36MM CT-1 1/2 CIR VCP944H

## (undated) DEVICE — SYRINGE IRRIG 60ML SFT PLIABLE BLB EZ TO GRP 1 HND USE W/

## (undated) DEVICE — BLANKET WRM W29.9XL79.1IN UP BODY FORC AIR MISTRAL-AIR

## (undated) DEVICE — SOLUTION SCRB 4OZ 10% POVIDONE IOD ANTIMIC BTL

## (undated) DEVICE — HYPODERMIC SAFETY NEEDLE: Brand: MAGELLAN

## (undated) DEVICE — WOUND RETRACTOR AND PROTECTOR: Brand: ALEXIS WOUND PROTECTOR-RETRACTOR

## (undated) DEVICE — INTENDED FOR TISSUE SEPARATION, AND OTHER PROCEDURES THAT REQUIRE A SHARP SURGICAL BLADE TO PUNCTURE OR CUT.: Brand: BARD-PARKER ® STAINLESS STEEL BLADES

## (undated) DEVICE — STERILE LATEX POWDER-FREE SURGICAL GLOVESWITH NITRILE COATING: Brand: PROTEXIS

## (undated) DEVICE — CYSTO PACK: Brand: MEDLINE INDUSTRIES, INC.

## (undated) DEVICE — MINOR SET UP PK

## (undated) DEVICE — WET SKIN PREP TRAY: Brand: MEDLINE INDUSTRIES, INC.

## (undated) DEVICE — CATHETER URET 5FR L70CM OPN END SGL LUMN INJ HUB FLEXIMA

## (undated) DEVICE — SUTURE VCRL SZ 0 L27IN ABSRB UD L36MM CT-1 1/2 CIR J260H

## (undated) DEVICE — SOLUTION IRRIGATION STRL H2O 1000 ML UROMATIC CONTAINER

## (undated) DEVICE — BLADE CLIPPER GEN PURP NS

## (undated) DEVICE — AGENT HEMSTAT W4XL8IN OXIDIZED REGENERATED CELOS ABSRB

## (undated) DEVICE — COVER LT HNDL BLU PLAS

## (undated) DEVICE — PENCIL SMK EVAC MPLR ULT VAC E Z CLN TLS MEGADYNE

## (undated) DEVICE — UNDERPAD INCONT XL MESH PROTCT + DISP FOR MAT USE

## (undated) DEVICE — SCRUB SURG BDINE FREE 4 OZ TECHNICARE

## (undated) DEVICE — GAUZE,SPONGE,4"X4",8PLY,STRL,LF,10/TRAY: Brand: MEDLINE

## (undated) DEVICE — BAG DRAIN UROLOGY W/HOSE

## (undated) DEVICE — DECANTER FLD 9IN ST BG FOR ASEP TRNSF OF FLD

## (undated) DEVICE — APPLICATOR ST RAYON TIP

## (undated) DEVICE — SYRINGE MED 10ML TRNSLUC BRL PLUNG BLK MRK POLYPR CTRL

## (undated) DEVICE — DRAPE,UNDERBUTTOCKS,PCH,STERILE: Brand: MEDLINE

## (undated) DEVICE — STERILE POLYISOPRENE POWDER-FREE SURGICAL GLOVES: Brand: PROTEXIS

## (undated) DEVICE — SOLUTION IV IRRIG WATER 1000ML POUR BRL 2F7114

## (undated) DEVICE — SUTURE VCRL + SZ 0 L27IN ABSRB UD L36MM CT-1 1/2 CIR VCPP41D

## (undated) DEVICE — SUTURE VCRL + SZ 2-0 L36IN ABSRB UD L36MM CT-1 1/2 CIR VCP945H

## (undated) DEVICE — DRAPE LAP 104X35X124IN BLU CTRL + FAB REINF ABD FEN

## (undated) DEVICE — SUTURE VCRL + SZ 2-0 L18IN ABSRB CLR TIE POLYGLACTIN 910 VCP111G

## (undated) DEVICE — PAD,NON-ADHERENT,3X8,STERILE,LF,1/PK: Brand: MEDLINE

## (undated) DEVICE — SUTURE VCRL + SZ 0 L18IN ABSRB UD L36MM CT-1 1/2 CIR VCP840D

## (undated) DEVICE — MERCY HEALTH WEST TURNOVER: Brand: MEDLINE INDUSTRIES, INC.

## (undated) DEVICE — GUIDEWIRE ENDOSCP L150CM DIA0.035IN TIP 3CM PTFE NIT

## (undated) DEVICE — SET ENDOSCP SEAL HYSTEROSCOPE RIG OUTFLO CHN DISP MYOSURE

## (undated) DEVICE — NEEDLE HYPO 22GA L1.5IN BLK POLYPR HUB S STL REG BVL STR

## (undated) DEVICE — SOLUTION IV IRRIG POUR BRL 0.9% SODIUM CHL 2F7124

## (undated) DEVICE — TOTAL TRAY, DB, 100% SILI FOLEY, 16FR 10: Brand: MEDLINE

## (undated) DEVICE — SUTURE VCRL + SZ 0 L18IN ABSRB VLT L26MM CT-2 1/2 CIR VCP727D

## (undated) DEVICE — PAD,ABDOMINAL,8"X10",ST,LF: Brand: MEDLINE

## (undated) DEVICE — NEEDLE SPNL L3.5IN PNK HUB S STL REG WALL FIT STYL W/ QNCKE

## (undated) DEVICE — PENCIL ES L3M BTTN SWCH S STL HEX LOK BLDE ELECTRD HOLSTER

## (undated) DEVICE — PAD SANITARY MTRN TAB BELT WRP NS 11IN

## (undated) DEVICE — POSITIONER HD W8XH4XL8.5IN RASPBERRY FOAM SLT

## (undated) DEVICE — MAJOR SET UP PK

## (undated) DEVICE — BINDER ABD UNISX 12IN 85IN 3XL UNIV

## (undated) DEVICE — SOLUTION IV 1000ML 0.9% SOD CHL PH 5 INJ USP VIAFLX PLAS